# Patient Record
Sex: FEMALE | Race: OTHER | HISPANIC OR LATINO
[De-identification: names, ages, dates, MRNs, and addresses within clinical notes are randomized per-mention and may not be internally consistent; named-entity substitution may affect disease eponyms.]

---

## 2017-05-31 ENCOUNTER — APPOINTMENT (OUTPATIENT)
Dept: GASTROENTEROLOGY | Facility: CLINIC | Age: 72
End: 2017-05-31

## 2017-05-31 VITALS
BODY MASS INDEX: 32.57 KG/M2 | SYSTOLIC BLOOD PRESSURE: 148 MMHG | HEART RATE: 64 BPM | WEIGHT: 177 LBS | HEIGHT: 62 IN | DIASTOLIC BLOOD PRESSURE: 72 MMHG | RESPIRATION RATE: 14 BRPM

## 2017-09-11 ENCOUNTER — APPOINTMENT (OUTPATIENT)
Dept: GASTROENTEROLOGY | Facility: CLINIC | Age: 72
End: 2017-09-11

## 2017-12-28 ENCOUNTER — APPOINTMENT (OUTPATIENT)
Dept: GASTROENTEROLOGY | Facility: CLINIC | Age: 72
End: 2017-12-28

## 2019-07-09 ENCOUNTER — APPOINTMENT (OUTPATIENT)
Dept: ORTHOPEDIC SURGERY | Facility: CLINIC | Age: 74
End: 2019-07-09

## 2019-07-18 ENCOUNTER — APPOINTMENT (OUTPATIENT)
Dept: ORTHOPEDIC SURGERY | Facility: CLINIC | Age: 74
End: 2019-07-18
Payer: MEDICARE

## 2019-07-18 DIAGNOSIS — M23.91 UNSPECIFIED INTERNAL DERANGEMENT OF RIGHT KNEE: ICD-10-CM

## 2019-07-18 DIAGNOSIS — M17.11 UNILATERAL PRIMARY OSTEOARTHRITIS, RIGHT KNEE: ICD-10-CM

## 2019-07-18 PROCEDURE — 99204 OFFICE O/P NEW MOD 45 MIN: CPT | Mod: 25

## 2019-07-18 PROCEDURE — 20610 DRAIN/INJ JOINT/BURSA W/O US: CPT | Mod: RT

## 2019-07-18 PROCEDURE — 73562 X-RAY EXAM OF KNEE 3: CPT | Mod: RT

## 2019-07-18 NOTE — PHYSICAL EXAM
[de-identified] : Laterality: Right knee\par \par General: Alert and oriented x3.  In no acute distress.  Pleasant in nature with a normal affect.  No apparent respiratory distress. \par \par Erythema, Warmth, Rubor: Negative\par Swelling/Edema: Negative \par ROM: 0-110 degrees\par Nicol's Test: Positive\par Medial Joint Line TTP: Positive\par Lateral Joint Line TTP: Slightly tender\par Lachman Exam/Anterior Drawer/Pivot Shift Test: Negative \par MCL Pain: Negative\par LCL Pain: Negative\par Iliotibial Band Pain: Negative\par Patellofemoral Joint Pain: Negative\par Pes Anserinus TTP: Negative\par Homans Sign: Negative\par Neurovascularly: Intact with no sensory or motor deficits\par  [de-identified] : 3 views of the right knee show medial compartment and patellofemoral compartment osteoarthritic changes.

## 2019-07-18 NOTE — HISTORY OF PRESENT ILLNESS
[FreeTextEntry1] : Radha is a 74-year-old female who presents with chronic right knee pain. Her pain scale today is a 6/10 right knee. She denies locking or catching of the right knee. She denies numbness and tingling going down the right lower extremity. She presents with her daughter in office today. No other complaints today.

## 2019-07-18 NOTE — PROCEDURE
[FreeTextEntry1] : Laterality: Right Knee\par \par The risks and benefits of the injection were reviewed with the patient today in office and all their questions were answered.  The injection site was the anterolateral aspect of the knee with the patient sitting up and the knees flexed to 90 degrees.  Prior to giving the injection the injection site was prepped with Chloraprep and a sterile field was created.  Sterile technique was carried out throughout the procedure.  After verbal consent from the patient we went ahead and injected the right knee today with 1 ml 40 mg Depo-Medrol, 5 ml 1% lidocaine and 4 ml of .5% Bupivacaine for a total of 10 ml with a 22 brandi 1.5" needle.  The medication was placed into the knee without complication.  Post injection the patient reported no pain, had a normal gait and good motion of the knee.  The patient denied numbness and tingling going down their leg.  There were no complications during the procedure.

## 2019-07-18 NOTE — DISCUSSION/SUMMARY
[de-identified] : Assessment: Right Knee Osteoarthritis/Pain\par \par Plan:\par 1. RICE Therapy.\par 2. Antiinflammatories/Tylenol as needed for pain of discomfort.\par 3. The patient was advised to rest the knee for 24-48 hours post injection.  The patient is able to resume normal activities in 24-48 hours post injection if the knee feels good.\par 4. Continue with a home exercise/stretching program. \par 5. All the patients questions were answered.  If the patient is experiencing any problems or has concerns they were advised to call the office or make an appointment to come in  to be evaluated.

## 2019-07-18 NOTE — CONSULT LETTER
[Dear  ___] : Dear  [unfilled], [Consult Letter:] : I had the pleasure of evaluating your patient, [unfilled]. [Please see my note below.] : Please see my note below. [Consult Closing:] : Thank you very much for allowing me to participate in the care of this patient.  If you have any questions, please do not hesitate to contact me. [Sincerely,] : Sincerely, [FreeTextEntry3] : Rey Hicks, \par Foot and Ankle surgery\par \par Orthopaedic Surgery\par [employer] [facility] [employer]\par

## 2019-10-09 ENCOUNTER — APPOINTMENT (OUTPATIENT)
Dept: ORTHOPEDIC SURGERY | Facility: CLINIC | Age: 74
End: 2019-10-09
Payer: MEDICARE

## 2019-10-09 VITALS
BODY MASS INDEX: 32.57 KG/M2 | HEIGHT: 62 IN | WEIGHT: 177 LBS | HEART RATE: 51 BPM | DIASTOLIC BLOOD PRESSURE: 74 MMHG | SYSTOLIC BLOOD PRESSURE: 114 MMHG

## 2019-10-09 DIAGNOSIS — M17.12 UNILATERAL PRIMARY OSTEOARTHRITIS, LEFT KNEE: ICD-10-CM

## 2019-10-09 PROCEDURE — 73562 X-RAY EXAM OF KNEE 3: CPT | Mod: LT

## 2019-10-09 PROCEDURE — 20610 DRAIN/INJ JOINT/BURSA W/O US: CPT | Mod: LT

## 2019-10-09 PROCEDURE — 99213 OFFICE O/P EST LOW 20 MIN: CPT | Mod: 25

## 2019-10-09 NOTE — PHYSICAL EXAM
[LE] : Sensory: Intact in bilateral lower extremities [ALL] : Biceps, brachioradialis, triceps, patellar, ankle and plantar 2+ and symmetric bilaterally [Normal] : Alert and in no acute distress [Obese] : obese [Antalgic] : not antalgic [Poor Appearance] : well-appearing [de-identified] : GENERAL APPEARANCE: Well nourished and hydrated, pleasant, alert, and oriented x 3. Appears their stated age. \par HEENT: Normocephalic, extraocular eye motion intact. Nasal septum midline. Oral cavity clear. External auditory canal clear. \par RESPIRATORY: Breath sounds clear and audible in all lobes. No wheezing, No accessory muscle use.\par CARDIOVASCULAR: No apparent abnormalities. No lower leg edema. No varicosities. Pedal pulses are palpable.\par NEUROLOGIC: Sensation is normal, no muscle weakness in the upper or lower extremities.\par DERMATOLOGIC: No apparent skin lesions, moist, warm, no rash.\par SPINE: Cervical spine appears normal and moves freely; thoracic spine appears normal and moves freely; lumbosacral spine appears normal and moves freely, normal, nontender.\par MUSCULOSKELETAL: Hands, wrists, and elbows are normal and move freely, shoulders are normal and move freely.  [Acute Distress] : not in acute distress [de-identified] : 3V Xray of the left knee done in office today and reviewed by Dr. Dg Hart demonstrates mild tricompartmental osteoarthritis of the left knee.  [de-identified] : Left knee exam shows neutral alignment, no significant joint effusion, no joint line tenderness, preserved ROM, crepitus with ROM, no pain with ROM.\par

## 2019-10-09 NOTE — PROCEDURE
[de-identified] : I injected the patient's left knee today with cortisone.\par \par I discussed at length with the patient the planned steroid and lidocaine injection. The risks, benefits, convalescence and alternatives were reviewed. The possible side effects discussed included but were not limited to: pain, swelling, heat, bleeding, and redness. Symptoms are generally mild but if they are extensive then contact the office. Giving pain relievers by mouth such as NSAIDs or Tylenol can generally treat the reactions to steroid and lidocaine. Rare cases of infection have been noted. Rash, hives and itching may occur post injection. If you have muscle pain or cramps, flushing and or swelling of the face, rapid heart beat, nausea, dizziness, fever, chills, headache, difficulty breathing, swelling in the arms or legs, or have a prickly feeling of your skin, contact a health care provider immediately. Following this discussion, the knee was prepped with Alcohol and under sterile condition the 80 mg Depo-Medrol and 6 cc Lidocaine injection was performed with a 20 gauge needle through a superolateral injection site. The needle was introduced into the joint, aspiration was performed to ensure intra-articular placement and the medication was injected. Upon withdrawal of the needle the site was cleaned with alcohol and a band aid applied. The patient tolerated the injection well and there were no adverse effects. Post injection instructions included no strenuous activity for 24 hours, cryotherapy and if there are any adverse effects to contact the office. \par \par \par

## 2019-10-09 NOTE — DISCUSSION/SUMMARY
[de-identified] : 74 year old  female with mild tricompartmental osteoarthritis of the left knee. She is not yet a candidate for left TKA and I recommended initially conservative treatment. She elected to receive a cortisone injection in her left knee which she tolerated well. Should pain persist or worsen despite cortisone injection I will order MRI of the bilateral knees for further evaluation of her chronic knee pain. F/U 3 months for repeat cortisone injection if needed.

## 2019-10-09 NOTE — ADDENDUM
[FreeTextEntry1] : I, Reyes Tobar, acted solely as a scribe for Dr. Dg Hart on this date 10/09/2019.

## 2019-10-09 NOTE — HISTORY OF PRESENT ILLNESS
[Standing] : standing [Walking] : worsened by walking [Intermit.] : ~He/She~ states the symptoms seem to be intermittent [Recumbency] : relieved by recumbency [Rest] : relieved by rest [Worsening] : worsening [___ yrs] : [unfilled] year(s) ago [7] : a current pain level of 7/10 [Bending] : worsened by bending [de-identified] : 74 year old female here for evaluation of bilateral knee pain, left worse than right.  patient states pain is chronic over past 5 years, but has been worsening for last 3 years. She states pain is diffuse in the left knee. She rates pain 7/10 in severity and describes pain as sharp. Pain is worse with walking. patient had left knee injection 2 years ago. Patient saw Dr. Hicks in July 2019 and had injection with relief.   [de-identified] : injections

## 2021-02-09 DIAGNOSIS — E55.9 VITAMIN D DEFICIENCY, UNSPECIFIED: ICD-10-CM

## 2021-02-09 DIAGNOSIS — K76.0 FATTY (CHANGE OF) LIVER, NOT ELSEWHERE CLASSIFIED: ICD-10-CM

## 2021-02-09 DIAGNOSIS — M19.90 UNSPECIFIED OSTEOARTHRITIS, UNSPECIFIED SITE: ICD-10-CM

## 2021-02-09 RX ORDER — SIMVASTATIN 10 MG/1
10 TABLET, FILM COATED ORAL
Qty: 90 | Refills: 0 | Status: DISCONTINUED | COMMUNITY
Start: 2019-09-11 | End: 2021-02-09

## 2021-02-09 RX ORDER — FLECAINIDE ACETATE 50 MG/1
50 TABLET ORAL
Qty: 60 | Refills: 0 | Status: DISCONTINUED | COMMUNITY
Start: 2019-10-04 | End: 2021-02-09

## 2021-02-09 RX ORDER — MELOXICAM 7.5 MG/1
7.5 TABLET ORAL
Qty: 30 | Refills: 0 | Status: DISCONTINUED | COMMUNITY
Start: 2019-07-23 | End: 2021-02-09

## 2021-02-10 ENCOUNTER — APPOINTMENT (OUTPATIENT)
Dept: CARDIOLOGY | Facility: CLINIC | Age: 76
End: 2021-02-10
Payer: MEDICARE

## 2021-02-10 VITALS
SYSTOLIC BLOOD PRESSURE: 115 MMHG | HEART RATE: 86 BPM | BODY MASS INDEX: 32.94 KG/M2 | TEMPERATURE: 97.6 F | HEIGHT: 62 IN | OXYGEN SATURATION: 97 % | RESPIRATION RATE: 16 BRPM | WEIGHT: 179 LBS | DIASTOLIC BLOOD PRESSURE: 75 MMHG

## 2021-02-10 DIAGNOSIS — Z82.3 FAMILY HISTORY OF STROKE: ICD-10-CM

## 2021-02-10 DIAGNOSIS — Z78.9 OTHER SPECIFIED HEALTH STATUS: ICD-10-CM

## 2021-02-10 DIAGNOSIS — Z72.3 LACK OF PHYSICAL EXERCISE: ICD-10-CM

## 2021-02-10 PROCEDURE — 99072 ADDL SUPL MATRL&STAF TM PHE: CPT

## 2021-02-10 PROCEDURE — 93000 ELECTROCARDIOGRAM COMPLETE: CPT

## 2021-02-10 PROCEDURE — 99204 OFFICE O/P NEW MOD 45 MIN: CPT

## 2021-02-10 RX ORDER — SIMVASTATIN 10 MG/1
10 TABLET, FILM COATED ORAL
Refills: 0 | Status: DISCONTINUED | COMMUNITY
End: 2021-02-10

## 2021-02-10 NOTE — REVIEW OF SYSTEMS
[Joint Swelling] : joint swelling [Joint Stiffness] : joint stiffness [FreeTextEntry1] : Other than as documented here and in the HPI, the thirteen point ROS is negative

## 2021-02-10 NOTE — REASON FOR VISIT
[FreeTextEntry1] : 75 year old Lithuanian speaking patient native of [facility] looking to transfer care from [facility].\par \par Her cardiac hx includes:\par 1. PAF now chronic  s/p previous  DCC ( 6/19/20).\par 2. B/L carotid disease\par 3. WIGGINS\par 4. RBBB\par \par Afib now persistent  despite antiarrhythmic tx with Rythmol. Underwent external cardioversion on 6/19/20 which converted her temporarily to SR. \par \par Apparently there might be some non-compliance issues with her meds ( rhymol and Eliquis)\par \par Intolerance of flecanide due to dizzy spells\par \par Last Pharmacologic stress test from 9/18/19 demonstrated normal rest-stress myocardial perfusion, ECG negative for ischemia and EF 74%\par \par 9/26/19 echo \par NL LV  70-75%\par Borderline LVH\par Mod. TR\par Mild- Mod. MR\par Mildly elevated PAP 37.7 mmHg\par Mild aortic valve sclerosis.\par \par Carotid Doppler\par 9/26/19\par Right- Mild of the dital RCCA, minimal soft plaque in the bulb and prox. MERY\par Left-  Mild plaque at the distal LCCA, buld and prox. MERY\par \par \par Nuclear stress test 9/18/2019:\par Rest stress myocardial perfusion.\par No evidence of ischemia\par Normal gated left ventricular systolic function.

## 2021-02-10 NOTE — HISTORY OF PRESENT ILLNESS
[FreeTextEntry1] : No family hx\par Denies any chest pain or sob. Does feel that her heart "races" when she climbs stairs but no other exertional discomfort.  May fatigue a bit when this occurs.  She does not feel that this is a significant departure from baseline.\par \par No palps, edema or orthopnea . Daughter present during appointment

## 2021-02-10 NOTE — ASSESSMENT
[FreeTextEntry1] : ECG- Afib at 86 bpm, nonspecific ST wave abnormalities. \par \par \par Lab data 9/19/20\par Chol. 173\par HDL 67\par LDL 90\par Tri. 73\par \par 9/26/19 echo \par NL LV  70-75%\par Borderline LVH\par Mod. TR\par Mild- Mod. MR\par Mildly elevated PAP 37.7 mmHg\par Mild aortic valve sclerosis.\par \par \par Carotid disease \par Right- Mild plaque at the distal RCCA, minimal soft plaque in the bulb and prox. MERY\par Left-  Mild plaque at the distal LCCA, bulb, and prox. MERY\par \par Impression\par \par  75 year old female with the following hx:\par 1. AFIB now chronic . Did have a cardioversion which converted her to SR initially. Today ECG demonstrates AFIB in the 80s and  gets heart racing sensation when climbing stairs.   Possibly not on sufficient amounts of AV node blocking agents.\par - Anticoagulated on Eliquis. \par \par 2. Last Melissa negative for ischemia. Does have mild  WIGGINS but probably related to deconditioning.  Never any chest pain\par \par 3. No family hx that she is aware of other then a CVA in her grandmother\par \par 4. S/P cataract sx that left her sensitive to light. \par \par 5. BP today controlled. Lipids acceptable for now. \par \par 6. b/l carotid disease , mild. \par \par 7. No evidence of ischemia on pharm. stress with preserved EF, mild LVH, mild PAH and valvular insufficiences. No signs of HF on exam today. \par \par \par Plan\par 1. Will increase Atenolol for better rate control to 25 mg 1 tablet BID. Repeat  24 holter to reassess rate in 1 week. \par 2. BW through PCP.\par 3. Repeat echo to reassess LVH, PAP and valvular insufficiences. \par 4. Medication compliance \par 5.  Instructed patient to limit caffeine and alcohol intake\par Clinical follow up in 2 months

## 2021-02-10 NOTE — PHYSICAL EXAM
[General Appearance - Well Developed] : well developed [Normal Appearance] : normal appearance [Normal Conjunctiva] : the conjunctiva exhibited no abnormalities [Eyelids - No Xanthelasma] : the eyelids demonstrated no xanthelasmas [Normal Oral Mucosa] : normal oral mucosa [No Oral Pallor] : no oral pallor [No Oral Cyanosis] : no oral cyanosis [Normal Oropharynx] : normal oropharynx [Normal Jugular Venous A Waves Present] : normal jugular venous A waves present [Normal Jugular Venous V Waves Present] : normal jugular venous V waves present [No Jugular Venous Burt A Waves] : no jugular venous burt A waves [Murmurs] : no murmurs present [Arterial Pulses Normal] : the arterial pulses were normal [Edema] : no peripheral edema present [Veins - Varicosity Changes] : no varicosital changes were noted in the lower extremities [Respiration, Rhythm And Depth] : normal respiratory rhythm and effort [Exaggerated Use Of Accessory Muscles For Inspiration] : no accessory muscle use [Auscultation Breath Sounds / Voice Sounds] : lungs were clear to auscultation bilaterally [Chest Palpation] : palpation of the chest revealed no abnormalities [Lungs Percussion] : the lungs were normal to percussion [Bowel Sounds] : normal bowel sounds [Abdomen Soft] : soft [Abdomen Tenderness] : non-tender [] : no hepato-splenomegaly [Abdomen Mass (___ Cm)] : no abdominal mass palpated [Abdomen Hernia] : no hernia was discovered [Abnormal Walk] : normal gait [Skin Color & Pigmentation] : normal skin color and pigmentation [Skin Turgor] : normal skin turgor [FreeTextEntry1] : obese

## 2021-02-17 ENCOUNTER — APPOINTMENT (OUTPATIENT)
Dept: CARDIOLOGY | Facility: CLINIC | Age: 76
End: 2021-02-17

## 2021-02-17 ENCOUNTER — APPOINTMENT (OUTPATIENT)
Dept: CARDIOLOGY | Facility: CLINIC | Age: 76
End: 2021-02-17
Payer: MEDICARE

## 2021-02-17 PROCEDURE — 99072 ADDL SUPL MATRL&STAF TM PHE: CPT

## 2021-02-17 PROCEDURE — 93306 TTE W/DOPPLER COMPLETE: CPT

## 2021-02-17 PROCEDURE — ZZZZZ: CPT

## 2021-02-27 ENCOUNTER — TRANSCRIPTION ENCOUNTER (OUTPATIENT)
Age: 76
End: 2021-02-27

## 2021-03-02 ENCOUNTER — NON-APPOINTMENT (OUTPATIENT)
Age: 76
End: 2021-03-02

## 2021-04-12 ENCOUNTER — APPOINTMENT (OUTPATIENT)
Dept: CARDIOLOGY | Facility: CLINIC | Age: 76
End: 2021-04-12
Payer: MEDICARE

## 2021-04-12 VITALS
BODY MASS INDEX: 32.39 KG/M2 | RESPIRATION RATE: 16 BRPM | TEMPERATURE: 97.4 F | OXYGEN SATURATION: 98 % | DIASTOLIC BLOOD PRESSURE: 80 MMHG | HEIGHT: 62 IN | SYSTOLIC BLOOD PRESSURE: 105 MMHG | WEIGHT: 176 LBS | HEART RATE: 67 BPM

## 2021-04-12 DIAGNOSIS — G47.9 SLEEP DISORDER, UNSPECIFIED: ICD-10-CM

## 2021-04-12 PROCEDURE — 93000 ELECTROCARDIOGRAM COMPLETE: CPT

## 2021-04-12 PROCEDURE — 99214 OFFICE O/P EST MOD 30 MIN: CPT

## 2021-04-12 PROCEDURE — 99072 ADDL SUPL MATRL&STAF TM PHE: CPT

## 2021-04-12 RX ORDER — PROPAFENONE HYDROCHLORIDE 225 MG/1
225 TABLET, FILM COATED ORAL TWICE DAILY
Refills: 0 | Status: DISCONTINUED | COMMUNITY
End: 2021-04-12

## 2021-04-12 NOTE — REVIEW OF SYSTEMS
[Joint Swelling] : joint swelling [Joint Stiffness] : joint stiffness [Recent Weight Loss (___ Lbs)] : recent [unfilled] ~Ulb weight loss [FreeTextEntry1] : Other than as documented here and in the HPI, the thirteen point ROS is negative

## 2021-04-12 NOTE — ASSESSMENT
[FreeTextEntry1] : ECG- Afib at 86 bpm, nonspecific ST wave abnormalities. \par \par Lab data 9/19/20\par Chol. 173\par HDL 67\par LDL 90\par Tri. 73\par \par Echocardiogram 2/17/2021:\par LVEF 55 to 60%\par Severe tricuspid regurgitation\par Pulmonary systolic pressure is 58.\par Biatrial enlargement.\par Mild RVH.\par \par 9/26/19 echo \par NL LV  70-75%\par Borderline LVH\par Mod. TR\par Mild- Mod. MR\par Mildly elevated PAP 37.7 mmHg\par Mild aortic valve sclerosis.\par \par Carotid disease \par Right- Mild plaque at the distal RCCA, minimal soft plaque in the bulb and prox. MERY\par Left-  Mild plaque at the distal LCCA, bulb, and prox. MERY\par \par Impression\par \par  75 year old female with the following hx:\par 1. AFIB now chronic . Did have a cardioversion which converted her to SR initially.  Holter monitor demonstrated persistent atrial fibrillation with an average heart rate of 82 and a range of 53-1 11. \par  Today ECG demonstrates AFIB in the 80s \par - Anticoagulated on Eliquis. \par \par 2. Last Melissa negative for ischemia. Does have mild  WIGGINS but probably related to deconditioning and now evidence of moderately severe pulmonary hypertension..  Never any chest pain\par \par 3. No family hx that she is aware of other then a CVA in her grandmother\par \par 4. S/P cataract sx that left her sensitive to light. \par \par 5. BP today controlled. Lipids acceptable for now. \par \par 6. b/l carotid disease , mild. \par \par 7.  Echocardiography demonstrated preserved EF but severe TR, pulmonary hypertension and biatrial enlargement.  There is also suggestion of mild RVH.\par \par 8.  Symptoms compatible with obstructive sleep apnea possibly significant if this is the cause of her pulmonary hypertension.\par \par \par Plan\par 1.  Sleep study evaluation to be obtained with regard to the question of obstructive sleep apnea being the underlying cause of her pulmonary pretension and atrial fibrillation.\par Sleep disturbances snoring gasping have all been noted \par \par 2.  Continuation of atenolol twice daily but will discontinue Rythmol as patient is persistently in AF\par \par 3.  Instructed about the significance of weight loss with regard to her possible sleep apnea and pulmonary pretension.\par \par 4. Medication compliance reinforced.\par \par 5.  Instructed patient to limit caffeine and alcohol intake\par Clinical follow up in 2 months

## 2021-04-12 NOTE — PHYSICAL EXAM
[General Appearance - Well Developed] : well developed [Normal Appearance] : normal appearance [Normal Conjunctiva] : the conjunctiva exhibited no abnormalities [Eyelids - No Xanthelasma] : the eyelids demonstrated no xanthelasmas [Normal Oral Mucosa] : normal oral mucosa [No Oral Pallor] : no oral pallor [No Oral Cyanosis] : no oral cyanosis [Normal Oropharynx] : normal oropharynx [Normal Jugular Venous A Waves Present] : normal jugular venous A waves present [Normal Jugular Venous V Waves Present] : normal jugular venous V waves present [No Jugular Venous Burt A Waves] : no jugular venous burt A waves [Respiration, Rhythm And Depth] : normal respiratory rhythm and effort [Exaggerated Use Of Accessory Muscles For Inspiration] : no accessory muscle use [Auscultation Breath Sounds / Voice Sounds] : lungs were clear to auscultation bilaterally [Chest Palpation] : palpation of the chest revealed no abnormalities [Lungs Percussion] : the lungs were normal to percussion [Murmurs] : no murmurs present [Arterial Pulses Normal] : the arterial pulses were normal [Edema] : no peripheral edema present [Veins - Varicosity Changes] : no varicosital changes were noted in the lower extremities [Bowel Sounds] : normal bowel sounds [Abdomen Soft] : soft [Abdomen Tenderness] : non-tender [] : no hepato-splenomegaly [Abdomen Mass (___ Cm)] : no abdominal mass palpated [Abdomen Hernia] : no hernia was discovered [Abnormal Walk] : normal gait [Skin Color & Pigmentation] : normal skin color and pigmentation [Skin Turgor] : normal skin turgor [FreeTextEntry1] : obese

## 2021-04-12 NOTE — HISTORY OF PRESENT ILLNESS
[FreeTextEntry1] : No family hx CAD \par \par Denies any chest pain or sob. Does feel that her heart "races" when she climbs stairs but no other exertional discomfort.  May fatigue a bit when this occurs.  She does not feel that this is a significant departure from baseline.\par \par No palps, edema or orthopnea . Daughter present during appointment and states she snores/ gasps/and has generally disturbed sleep patterns\par .\par Her family has tried to watch her diet/ weight and down 3 lbs. \par \par Results of her echo will be reviewed\par \par

## 2021-04-12 NOTE — REASON FOR VISIT
[FreeTextEntry1] : 76 year old Hebrew speaking patient native of [facility] looking to transfer care from [facility].\par \par During her initial visit a holter monitor to assess her rate/rhythm revealed max rate of 111 bpm and she was instructed to continue her Atenolol BID. \par \par Her cardiac hx includes:\par 1. PAF now chronic  s/p previous  DCC ( 6/19/20).\par 2. B/L carotid disease\par 3. WIGGINS\par 4. RBBB\par \par Afib now persistent  despite antiarrhythmic tx with Rythmol. Underwent external cardioversion on 6/19/20 which converted her temporarily to SR. \par \par Apparently there might be some non-compliance issues in the past  with her meds ( rhymol and Eliquis)\par \par Intolerance of flecanide due to dizzy spells\par \par Last Pharmacologic stress test from 9/18/19 demonstrated normal rest-stress myocardial perfusion, ECG negative for ischemia and EF 74%\par \par 9/26/19 echo \par NL LV  70-75%\par Borderline LVH\par Mod. TR\par Mild- Mod. MR\par Mildly elevated PAP 37.7 mmHg\par Mild aortic valve sclerosis.\par \par Carotid Doppler\par 9/26/19\par Right- Mild of the dital RCCA, minimal soft plaque in the bulb and prox. MERY\par Left-  Mild plaque at the distal LCCA, buld and prox. MERY\par \par \par Nuclear stress test 9/18/2019:\par Rest stress myocardial perfusion.\par No evidence of ischemia\par Normal gated left ventricular systolic function.

## 2021-05-14 ENCOUNTER — OUTPATIENT (OUTPATIENT)
Dept: OUTPATIENT SERVICES | Facility: HOSPITAL | Age: 76
LOS: 1 days | End: 2021-05-14
Payer: COMMERCIAL

## 2021-05-14 DIAGNOSIS — G47.33 OBSTRUCTIVE SLEEP APNEA (ADULT) (PEDIATRIC): ICD-10-CM

## 2021-05-14 PROCEDURE — 95806 SLEEP STUDY UNATT&RESP EFFT: CPT | Mod: 26

## 2021-05-14 PROCEDURE — 95806 SLEEP STUDY UNATT&RESP EFFT: CPT

## 2021-06-24 ENCOUNTER — APPOINTMENT (OUTPATIENT)
Dept: CARDIOLOGY | Facility: CLINIC | Age: 76
End: 2021-06-24
Payer: MEDICARE

## 2021-06-24 VITALS
OXYGEN SATURATION: 98 % | HEART RATE: 48 BPM | BODY MASS INDEX: 32.39 KG/M2 | HEIGHT: 62 IN | WEIGHT: 176 LBS | DIASTOLIC BLOOD PRESSURE: 65 MMHG | RESPIRATION RATE: 16 BRPM | SYSTOLIC BLOOD PRESSURE: 137 MMHG

## 2021-06-24 PROCEDURE — 93000 ELECTROCARDIOGRAM COMPLETE: CPT

## 2021-06-24 PROCEDURE — 99214 OFFICE O/P EST MOD 30 MIN: CPT

## 2021-06-24 NOTE — PHYSICAL EXAM
[General Appearance - Well Developed] : well developed [Normal Appearance] : normal appearance [Normal Conjunctiva] : the conjunctiva exhibited no abnormalities [Eyelids - No Xanthelasma] : the eyelids demonstrated no xanthelasmas [Normal Oral Mucosa] : normal oral mucosa [No Oral Pallor] : no oral pallor [No Oral Cyanosis] : no oral cyanosis [Normal Oropharynx] : normal oropharynx [Normal Jugular Venous A Waves Present] : normal jugular venous A waves present [Normal Jugular Venous V Waves Present] : normal jugular venous V waves present [No Jugular Venous Burt A Waves] : no jugular venous burt A waves [Respiration, Rhythm And Depth] : normal respiratory rhythm and effort [Exaggerated Use Of Accessory Muscles For Inspiration] : no accessory muscle use [Auscultation Breath Sounds / Voice Sounds] : lungs were clear to auscultation bilaterally [Chest Palpation] : palpation of the chest revealed no abnormalities [Lungs Percussion] : the lungs were normal to percussion [Murmurs] : no murmurs present [Edema] : no peripheral edema present [Arterial Pulses Normal] : the arterial pulses were normal [Veins - Varicosity Changes] : no varicosital changes were noted in the lower extremities [Bowel Sounds] : normal bowel sounds [Abdomen Soft] : soft [Abdomen Tenderness] : non-tender [] : no hepato-splenomegaly [Abdomen Mass (___ Cm)] : no abdominal mass palpated [Abdomen Hernia] : no hernia was discovered [Abnormal Walk] : normal gait [Skin Color & Pigmentation] : normal skin color and pigmentation [Skin Turgor] : normal skin turgor [FreeTextEntry1] : obese

## 2021-06-24 NOTE — HISTORY OF PRESENT ILLNESS
[FreeTextEntry1] : No family hx CAD \par \par Denies any chest pain or sob. With exertion, although she does not exercise she has some mild SOB and chest pressure accompanied with fatigue and body aches. \par \par No palps, edema or orthopnea .\par .\par \par \par \par

## 2021-06-24 NOTE — REASON FOR VISIT
[FreeTextEntry1] : 76 year old Ukrainian speaking patient native of [facility]  presenting for cardiac re evaluation. \par \par During her initial visit a holter monitor to assess her rate/rhythm revealed max rate of 111 bpm and she was instructed to continue her Atenolol BID. \par During her LOV we stopped her Rythmol and she feels fine. \par \par Mentions some balance issues which is chronic.\par Had her HST which revealed Mild GOYO with 8.7 events/hr\par There was no pulmonary/sleep medicine follow-up.\par Daughter present during appointment and states she snores/ gasps/and has generally disturbed sleep patterns\par \par \par Her cardiac hx includes:\par 1. PAF s/p previous  DCC ( 6/19/20).\par 2. B/L carotid disease\par 3. WIGGINS\par 4. RBBB\par \par Previous cardiac work up through [facility]:\par  Underwent external cardioversion on 6/19/20 which converted her temporarily to SR. \par \par Apparently there might be some non-compliance issues in the past  with her meds ( rhymol and Eliquis)\par Intolerance of flecanide due to dizzy spells\par \par Last Pharmacologic stress test from 9/18/19 demonstrated normal rest-stress myocardial perfusion, ECG negative for ischemia and EF 74%\par \par 9/26/19 echo \par NL LV  70-75%\par Borderline LVH\par Mod. TR\par Mild- Mod. MR\par Mildly elevated PAP 37.7 mmHg\par Mild aortic valve sclerosis.\par \par Carotid Doppler\par 9/26/19\par Right- Mild of the dital RCCA, minimal soft plaque in the bulb and prox. MERY\par Left-  Mild plaque at the distal LCCA, buld and prox. MERY\par \par Nuclear stress test 9/18/2019:\par Rest stress myocardial perfusion.\par No evidence of ischemia\par Normal gated left ventricular systolic function.

## 2021-06-24 NOTE — ASSESSMENT
[FreeTextEntry1] : ECG- Sinus bradycardia 48 bpm. Nonspecifc intraventricular block. Possible anterolateral MI.                                                                \par Lab data 9/19/20\par Chol. 173\par HDL 67\par LDL 90\par Tri. 73\par \par Home sleep study 5/14/2021:\par Respiratory event index 8.7 consistent with mild obstructive sleep apnea.\par Lowest saturation 75%\par AutoPap titration was recommended\par \par Echocardiogram 2/17/2021:\par LVEF 55 to 60%\par Severe tricuspid regurgitation\par Pulmonary systolic pressure is 58.\par Biatrial enlargement.\par Mild RVH.\par \par 9/26/19 echo \par NL LV  70-75%\par Borderline LVH\par Mod. TR\par Mild- Mod. MR\par Mildly elevated PAP 37.7 mmHg\par Mild aortic valve sclerosis.\par \par Carotid disease \par Right- Mild plaque at the distal RCCA, minimal soft plaque in the bulb and prox. MERY\par Left-  Mild plaque at the distal LCCA, bulb, and prox. MERY\par \par Impression\par \par  76 year old female with the following hx:\par 1. AFIB was persistent but now back in sinus rhythm off of Rythmol\par    Did have a cardioversion which converted her to SR initially. \par    Holter monitor demonstrated persistent atrial fibrillation with an average heart rate of 82 and a range of . \par - Anticoagulated on Eliquis.\par -Now off of Rythmol and continued SR, denies any palps.  \par \par 2. Last Melissa negative for ischemia. Does have mild  WIGGINS with chest pressure  but probably related to deconditioning and now evidence of moderately severe pulmonary hypertension..  Never any chest pain, no successful weight loss or attempt.  ECG at her baseline \par \par 3. No family hx that she is aware of other then a CVA in her grandmother\par \par 4. S/P cataract sx that left her sensitive to light. \par \par 5. BP today controlled. Lipids acceptable for now. \par \par 6. b/l carotid disease , mild. \par \par 7.  Echocardiography demonstrated preserved EF but severe TR, pulmonary hypertension and biatrial      enlargement.  There is also suggestion of mild RVH.\par \par 8.  Symptoms compatible with obstructive sleep apnea possibly a contributing cause of her pulmonary hypertension.  Mild obstructive sleep apnea determined by home sleep study, states she was never contacted to make appt. with pulm. \par \par \par Plan\par 1.  Will reach out to sleep medicinet to schedule f/u. Daughters are the best point of contact. \par \par 2.  Continuation of atenolol twice daily . No rhythmol\par \par 3. Both Mrs. Mosquera and her daughter understand the importance of  starting some  type of formal exercise pattern to prevent further deconditioning. If by her next appointment there is weight loss and she continues to have WIGGINS we will  pursue with further stress testing. \par \par 4. Medication compliance reinforced.\par \par 5.  Instructed patient to limit caffeine and alcohol intake\par \par Clinical follow up in 3 months

## 2021-06-29 ENCOUNTER — APPOINTMENT (OUTPATIENT)
Dept: PULMONOLOGY | Facility: CLINIC | Age: 76
End: 2021-06-29
Payer: MEDICARE

## 2021-06-29 VITALS
SYSTOLIC BLOOD PRESSURE: 126 MMHG | WEIGHT: 176 LBS | OXYGEN SATURATION: 97 % | BODY MASS INDEX: 33.66 KG/M2 | HEART RATE: 64 BPM | RESPIRATION RATE: 16 BRPM | DIASTOLIC BLOOD PRESSURE: 78 MMHG | HEIGHT: 60.5 IN

## 2021-06-29 PROCEDURE — 99204 OFFICE O/P NEW MOD 45 MIN: CPT

## 2021-06-29 NOTE — DISCUSSION/SUMMARY
[Obstructive Sleep Apnea] : obstructive sleep apnea [Alcohol Avoidance] : alcohol avoidance [Sedative Avoidance] : sedative avoidance [Weight Loss Program] : weight loss program [CPAP] : CPAP [de-identified] : unclear

## 2021-06-29 NOTE — CONSULT LETTER
[Dear  ___] : Dear  [unfilled], [Consult Letter:] : I had the pleasure of evaluating your patient, [unfilled]. [Please see my note below.] : Please see my note below. [Consult Closing:] : Thank you very much for allowing me to participate in the care of this patient.  If you have any questions, please do not hesitate to contact me. [Sincerely,] : Sincerely, [FreeTextEntry3] : Anthony Adair MD FCCP\par Pulmonary/Critical Care/Sleep Medicine\par Department of Internal Medicine\par \par [facility]\par

## 2021-06-29 NOTE — DISCUSSION/SUMMARY
[Obstructive Sleep Apnea] : obstructive sleep apnea [Alcohol Avoidance] : alcohol avoidance [Sedative Avoidance] : sedative avoidance [Weight Loss Program] : weight loss program [CPAP] : CPAP [de-identified] : unclear

## 2021-06-29 NOTE — PHYSICAL EXAM
[No Acute Distress] : no acute distress [Normal Oropharynx] : normal oropharynx [IV] : Mallampati Class: IV [Neck Circumference: ___] : neck circumference: [unfilled] [Normal Appearance] : normal appearance [No Neck Mass] : no neck mass [Normal Rate/Rhythm] : normal rate/rhythm [Normal S1, S2] : normal s1, s2 [No Murmurs] : no murmurs [No Resp Distress] : no resp distress [Clear to Auscultation Bilaterally] : clear to auscultation bilaterally [No Abnormalities] : no abnormalities [Benign] : benign [Normal Gait] : normal gait [No Clubbing] : no clubbing [No Cyanosis] : no cyanosis [No Edema] : no edema [FROM] : FROM [Normal Color/ Pigmentation] : normal color/ pigmentation [No Focal Deficits] : no focal deficits [Oriented x3] : oriented x3 [Normal Affect] : normal affect

## 2021-06-29 NOTE — HISTORY OF PRESENT ILLNESS
[Awakes Unrefreshed] : awakes unrefreshed [Awakes with Dry Mouth] : awakes with dry mouth [Awakes with Headache] : awakes with headache [Snoring] : snoring [Witnessed Apneas] : witnessed apneas [Obstructive Sleep Apnea] : obstructive sleep apnea [TextBox_77] : 2am [TextBox_79] : 6am [TextBox_81] : 15 [TextBox_85] : 7 [TextBox_100] : 5/14/21 [TextBox_108] : 8.7 [TextBox_116] : 75 [ESS] : 3

## 2021-07-12 ENCOUNTER — APPOINTMENT (OUTPATIENT)
Age: 76
End: 2021-07-12
Payer: MEDICARE

## 2021-07-12 ENCOUNTER — OUTPATIENT (OUTPATIENT)
Dept: OUTPATIENT SERVICES | Facility: HOSPITAL | Age: 76
LOS: 1 days | End: 2021-07-12
Payer: COMMERCIAL

## 2021-07-12 DIAGNOSIS — G47.33 OBSTRUCTIVE SLEEP APNEA (ADULT) (PEDIATRIC): ICD-10-CM

## 2021-07-12 PROCEDURE — 95810 POLYSOM 6/> YRS 4/> PARAM: CPT

## 2021-07-12 PROCEDURE — 95810 POLYSOM 6/> YRS 4/> PARAM: CPT | Mod: 26

## 2021-07-20 ENCOUNTER — APPOINTMENT (OUTPATIENT)
Dept: DERMATOLOGY | Facility: CLINIC | Age: 76
End: 2021-07-20

## 2021-09-13 ENCOUNTER — APPOINTMENT (OUTPATIENT)
Dept: CARDIOLOGY | Facility: CLINIC | Age: 76
End: 2021-09-13

## 2021-11-01 ENCOUNTER — APPOINTMENT (OUTPATIENT)
Dept: CARDIOLOGY | Facility: CLINIC | Age: 76
End: 2021-11-01
Payer: MEDICARE

## 2021-11-01 VITALS
DIASTOLIC BLOOD PRESSURE: 75 MMHG | HEART RATE: 86 BPM | SYSTOLIC BLOOD PRESSURE: 118 MMHG | WEIGHT: 170 LBS | BODY MASS INDEX: 32.51 KG/M2 | OXYGEN SATURATION: 98 % | HEIGHT: 60.5 IN | RESPIRATION RATE: 16 BRPM

## 2021-11-01 DIAGNOSIS — R00.0 TACHYCARDIA, UNSPECIFIED: ICD-10-CM

## 2021-11-01 PROCEDURE — 99214 OFFICE O/P EST MOD 30 MIN: CPT

## 2021-11-01 PROCEDURE — 93000 ELECTROCARDIOGRAM COMPLETE: CPT

## 2021-11-01 RX ORDER — ATENOLOL 25 MG/1
25 TABLET ORAL TWICE DAILY
Qty: 180 | Refills: 2 | Status: DISCONTINUED | COMMUNITY
Start: 2019-06-11 | End: 2021-11-01

## 2021-11-01 NOTE — PHYSICAL EXAM
[General Appearance - Well Developed] : well developed [Normal Appearance] : normal appearance [Normal Conjunctiva] : the conjunctiva exhibited no abnormalities [Eyelids - No Xanthelasma] : the eyelids demonstrated no xanthelasmas [Normal Oral Mucosa] : normal oral mucosa [No Oral Pallor] : no oral pallor [No Oral Cyanosis] : no oral cyanosis [Normal Oropharynx] : normal oropharynx [Normal Jugular Venous A Waves Present] : normal jugular venous A waves present [Normal Jugular Venous V Waves Present] : normal jugular venous V waves present [No Jugular Venous Burt A Waves] : no jugular venous burt A waves [Respiration, Rhythm And Depth] : normal respiratory rhythm and effort [Exaggerated Use Of Accessory Muscles For Inspiration] : no accessory muscle use [Auscultation Breath Sounds / Voice Sounds] : lungs were clear to auscultation bilaterally [Chest Palpation] : palpation of the chest revealed no abnormalities [Lungs Percussion] : the lungs were normal to percussion [Murmurs] : no murmurs present [Arterial Pulses Normal] : the arterial pulses were normal [Edema] : no peripheral edema present [Veins - Varicosity Changes] : no varicosital changes were noted in the lower extremities [Bowel Sounds] : normal bowel sounds [Abdomen Soft] : soft [Abdomen Tenderness] : non-tender [] : no hepato-splenomegaly [Abdomen Mass (___ Cm)] : no abdominal mass palpated [Abdomen Hernia] : no hernia was discovered [Abnormal Walk] : normal gait [Skin Color & Pigmentation] : normal skin color and pigmentation [Skin Turgor] : normal skin turgor [FreeTextEntry1] : Irregular S1-S2

## 2021-11-01 NOTE — HISTORY OF PRESENT ILLNESS
[FreeTextEntry1] : No family hx CAD \par \par Denies any chest pain or sob.  Avoids any kind of strenuous activity as it exacerbates her palps. \par Today she is in AFIB but is not aware \par \par Denies CP, WIGGINS edema or orthopnea. \par \par Daughter is present for translation purposes. \par .\par \par \par \par

## 2021-11-01 NOTE — REASON FOR VISIT
[FreeTextEntry1] : 76 year old Telugu speaking patient native of [facility]  presenting for cardiac re evaluation. \par \par During her initial visit a holter monitor to assess her rate/rhythm revealed max rate of 111 bpm and she was instructed to continue her Atenolol BID. \par During her LOV we stopped her Rythmol and she feels fine. \par \par Mentions some balance issues which is chronic.\par 7/12/21 HST which revealed Mild GOYO with 8.7 events/hr.\par She was evaluated by Dr Brunilda Adair in June and was to be set up for CPAP. They have tried to contact her but have been unsuccessful. \par \par Her cardiac hx includes:\par 1. PAF s/p previous  DCC ( 6/19/20).\par 2. B/L carotid disease\par 3. WIGGINS\par 4. RBBB\par \par Previous cardiac work up through [place] [facility]:\par  Underwent external cardioversion on 6/19/20 which converted her temporarily to SR. \par \par Apparently there might be some non-compliance issues in the past  with her meds ( rhymol and Eliquis)\par Intolerance of Flecanide due to dizzy spells\par \par Last Pharmacologic stress test from 9/18/19 demonstrated normal rest-stress myocardial perfusion, ECG negative for ischemia and EF 74%\par \par 9/26/19 echo \par NL LV  70-75%\par Borderline LVH\par Mod. TR\par Mild- Mod. MR\par Mildly elevated PAP 37.7 mmHg\par Mild aortic valve sclerosis.\par \par Carotid Doppler\par 9/26/19\par Right- Mild of the distal RCCA, minimal soft plaque in the bulb and prox. MERY\par Left-  Mild plaque at the distal LCCA, buld and prox. MERY\par \par Nuclear stress test 9/18/2019:\par Rest stress myocardial perfusion.\par No evidence of ischemia\par Normal gated left ventricular systolic function.

## 2021-11-01 NOTE — ASSESSMENT
[FreeTextEntry1] : ECG-Atrial fibrillation with a ventricular rate of 86 bpm.  Poor progression.  Consider anteroseptal infarct.                                                         \par Lab data 9/19/20\par Chol. 173\par HDL 67\par LDL 90\par Tri. 73\par \par Home sleep study 5/14/2021:\par Respiratory event index 8.7 consistent with mild obstructive sleep apnea.\par Lowest saturation 75%\par AutoPap titration was recommended\par \par Attended sleep study 7/12/2021:\par AHI equals 65.6\par Richard O2 63%\par Impression severe obstructive sleep apnea\par \par Echocardiogram 2/17/2021:\par LVEF 55 to 60%\par Severe tricuspid regurgitation\par Pulmonary systolic pressure is 58.\par Biatrial enlargement.\par Mild RVH.\par \par 9/26/19 echo \par NL LV  70-75%\par Borderline LVH\par Mod. TR\par Mild- Mod. MR\par Mildly elevated PAP 37.7 mmHg\par Mild aortic valve sclerosis.\par \par Carotid disease \par Right- Mild plaque at the distal RCCA, minimal soft plaque in the bulb and prox. MERY\par Left-  Mild plaque at the distal LCCA, bulb, and prox. MERY\par \par Impression\par \par  76 year old female with the following hx:\par 1. AFIB initially persistent, transitioning back and forth in sinus and now back in A. fib\par    Did have a cardioversion which converted her to SR initially. \par    Holter monitor demonstrated persistent atrial fibrillation with an average heart rate of 82 and a range of . \par - Anticoagulated on Eliquis.\par -No longer on Rythmol and unaware that she is in AFIB.\par -Palps with exertion\par \par 2. Last Melissa negative for ischemia. Does have mild  WIGGINS with chest pressure  but probably related to deconditioning and now evidence of moderately severe pulmonary hypertension  \par \par 3. No family hx that she is aware of other then a CVA in her grandmother\par \par 4. S/P cataract sx that left her sensitive to light. \par \par 5. BP today controlled\par \par 6. b/l carotid disease , mild. \par \par 7.  Echocardiography demonstrated preserved EF but severe TR, pulmonary hypertension and biatrial      enlargement.  There is also suggestion of mild RVH.\par \par 8.  Evidence of severe obstructive sleep apnea unattended study as opposed to mild on the home sleep study\par \par 9. No signs of HF on exam or coronary symptoms. \par \par 10.Down 6 lbs\par \par Plan\par 1.  Sleep medicine contact info given. \par \par 2.  For smoother rate control of AFIB, we will stop Atenolol and start Metoprolol ER 50 QD\par \par 3. Both Mrs. Mosquera and her daughter understand the importance of maintaining some type of  formal exercise         pattern and strict diet modifications. \par \par 4. If WIGGINS returns, consider stress testing. \par \par 5.  Instructed patient to limit caffeine and alcohol intake\par \par 6.  Instructed patient about the absolute need for follow-up with sleep medicine regarding treatment of what is now severe obstructive sleep apnea\par \par Clinical follow up in 3 months

## 2021-11-04 ENCOUNTER — APPOINTMENT (OUTPATIENT)
Dept: PULMONOLOGY | Facility: CLINIC | Age: 76
End: 2021-11-04
Payer: MEDICARE

## 2021-11-04 VITALS
DIASTOLIC BLOOD PRESSURE: 60 MMHG | OXYGEN SATURATION: 98 % | HEIGHT: 60 IN | HEART RATE: 80 BPM | RESPIRATION RATE: 16 BRPM | BODY MASS INDEX: 33.57 KG/M2 | SYSTOLIC BLOOD PRESSURE: 108 MMHG | WEIGHT: 171 LBS

## 2021-11-04 PROCEDURE — 99215 OFFICE O/P EST HI 40 MIN: CPT

## 2021-11-04 RX ORDER — ZOLPIDEM TARTRATE 10 MG/1
10 TABLET ORAL
Qty: 1 | Refills: 0 | Status: DISCONTINUED | COMMUNITY
Start: 2021-06-29 | End: 2021-11-04

## 2021-11-04 RX ORDER — UBIDECARENONE/VIT E ACET 100MG-5
CAPSULE ORAL ONCE
Refills: 0 | Status: DISCONTINUED | COMMUNITY
End: 2021-11-04

## 2021-11-04 NOTE — DISCUSSION/SUMMARY
[Obstructive Sleep Apnea] : obstructive sleep apnea [Severe] : severe [Alcohol Avoidance] : alcohol avoidance [Sedative Avoidance] : sedative avoidance [Weight Loss Program] : weight loss program [CPAP] : CPAP [de-identified] : The pathophysiology of sleep was explained to the patient in detail. Inclusive of this was the reasoning behind and the expected response to positive airway pressure therapy. Compliance was outlined including further followup [de-identified] : [employer] Airsense 10 autoset (for her) in a range 4-16 with N20 mask

## 2021-11-04 NOTE — HISTORY OF PRESENT ILLNESS
[Obstructive Sleep Apnea] : obstructive sleep apnea [Awakes Unrefreshed] : awakes unrefreshed [Awakes with Dry Mouth] : awakes with dry mouth [Awakes with Headache] : awakes with headache [Snoring] : snoring [Witnessed Apneas] : witnessed apneas [Lab] : lab [TextBox_77] : 2am [TextBox_79] : 6am [TextBox_81] : 15 [TextBox_85] : 7 [TextBox_100] : 7/21/21 [TextBox_108] : 65.6 [TextBox_112] : 6.8 [TextBox_116] : 63 [TextBox_120] : Rem Index 85.3 [ESS] : 3

## 2021-11-04 NOTE — END OF VISIT
[FreeTextEntry3] :  and CPAP initiation [Time Spent: ___ minutes] : I have spent [unfilled] minutes of time on the encounter.

## 2021-11-04 NOTE — REASON FOR VISIT
[Initial] : an initial visit [Sleep Apnea] : sleep apnea [[employer] ] : provided by [employer]   [Interpreters_IDNumber] : [id number] [Interpreters_FullName] : nahid [TWNoteComboBox1] : Malian

## 2021-11-04 NOTE — CONSULT LETTER
[Dear  ___] : Dear  [unfilled], [Consult Letter:] : I had the pleasure of evaluating your patient, [unfilled]. [Please see my note below.] : Please see my note below. [Consult Closing:] : Thank you very much for allowing me to participate in the care of this patient.  If you have any questions, please do not hesitate to contact me. [Sincerely,] : Sincerely, [FreeTextEntry3] : Anthony Adair MD FCCP\par Pulmonary/Critical Care/Sleep Medicine\par Department of Internal Medicine\par \par [facility]\par

## 2022-02-03 ENCOUNTER — APPOINTMENT (OUTPATIENT)
Dept: PULMONOLOGY | Facility: CLINIC | Age: 77
End: 2022-02-03

## 2022-02-07 ENCOUNTER — APPOINTMENT (OUTPATIENT)
Dept: CARDIOLOGY | Facility: CLINIC | Age: 77
End: 2022-02-07
Payer: MEDICARE

## 2022-02-07 VITALS
HEIGHT: 60 IN | BODY MASS INDEX: 33.77 KG/M2 | SYSTOLIC BLOOD PRESSURE: 110 MMHG | OXYGEN SATURATION: 98 % | DIASTOLIC BLOOD PRESSURE: 80 MMHG | WEIGHT: 172 LBS | RESPIRATION RATE: 16 BRPM | HEART RATE: 77 BPM

## 2022-02-07 DIAGNOSIS — R42 DIZZINESS AND GIDDINESS: ICD-10-CM

## 2022-02-07 PROCEDURE — 99214 OFFICE O/P EST MOD 30 MIN: CPT

## 2022-02-07 PROCEDURE — 93000 ELECTROCARDIOGRAM COMPLETE: CPT

## 2022-02-07 NOTE — HISTORY OF PRESENT ILLNESS
[FreeTextEntry1] : No family hx CAD \par \par Denies any chest pain or sob.  Avoids any kind of strenuous activity as it exacerbates her palps. \par Today she is in AFIB but is not aware \par \par Denies CP, WIGGINS edema or orthopnea. \par \par Daughter is present for translation purposes. \par \par Admits to dietary indiscretions particularly with carbohydrates\par .\par \par \par \par

## 2022-02-07 NOTE — REASON FOR VISIT
[FreeTextEntry1] : 76 year old German speaking patient native of [facility]  presenting for cardiac re evaluation. \par Preop for colonoscopy with c/o Constipation\par \par Holter monitor to assess her rate/rhythm revealed max rate of 111 bpm and she was instructed to continue her Atenolol BID, eventually converted to Toprol-XL 50 mg daily.\par During her LOV we had instructed her to stop propafenone.  \par Now states that she has been getting it regularly from her pharmacy and has continued it to this point.\par \par Mentions some balance issues which is chronic.\par 7/12/21 HST which revealed Mild GOYO with 8.7 events/hr.\par She was evaluated by Dr Brunilda Adair in June and was to be set up for CPAP. They have tried to contact her but have been unsuccessful. \par \par Her cardiac hx includes:\par 1. PAF s/p previous  DCC ( 6/19/20).\par 2. B/L carotid disease\par 3. WIGGINS\par 4. RBBB\par \par Previous cardiac work up through [facility]:\par  Underwent external cardioversion on 6/19/20 which converted her temporarily to SR. \par \par Apparently there might be some non-compliance issues in the past  with her meds ( rhymol and Eliquis)\par Intolerance of Flecanide due to dizzy spells\par \par Last Pharmacologic stress test from 9/18/19 demonstrated normal rest-stress myocardial perfusion, ECG negative for ischemia and EF 74%\par \par 9/26/19 echo \par NL LV  70-75%\par Borderline LVH\par Mod. TR\par Mild- Mod. MR\par Mildly elevated PAP 37.7 mmHg\par Mild aortic valve sclerosis.\par \par Carotid Doppler\par 9/26/19\par Right- Mild of the distal RCCA, minimal soft plaque in the bulb and prox. MERY\par Left-  Mild plaque at the distal LCCA, buld and prox. MERY\par \par Nuclear stress test 9/18/2019:\par Rest stress myocardial perfusion.\par No evidence of ischemia\par Normal gated left ventricular systolic function.

## 2022-02-07 NOTE — ASSESSMENT
[FreeTextEntry1] : ECG-Atrial fibrillation with a ventricular rate of 77  bpm.  Poor progression.  Consider anteroseptal infarct.                                                         \par Lab data \par ------9/19/20---11/23/21\par Cho---173--------151\par HDL----67---------71\par LDL----90---------70\par Trig----73---------52\par \par Home sleep study 5/14/2021:\par Respiratory event index 8.7 consistent with mild obstructive sleep apnea.\par Lowest saturation 75%\par AutoPap titration was recommended\par \par Attended sleep study 7/12/2021:\par AHI equals 65.6\par Richard O2 63%\par Impression severe obstructive sleep apnea\par \par Echocardiogram 2/17/2021:\par LVEF 55 to 60%\par Severe tricuspid regurgitation\par Pulmonary systolic pressure is 58.\par Biatrial enlargement.\par Mild RVH.\par \par 9/26/19 echo \par NL LV  70-75%\par Borderline LVH\par Mod. TR\par Mild- Mod. MR\par Mildly elevated PAP 37.7 mmHg\par Mild aortic valve sclerosis.\par \par Carotid disease \par Right- Mild plaque at the distal RCCA, minimal soft plaque in the bulb and prox. MERY\par Left-  Mild plaque at the distal LCCA, bulb, and prox. MERY\par \par Impression\par \par  76 year old female preop for colonoscopy with the following hx:\par \par 1. AFIB initially paroxysmal transitioning back and forth in sinus and now seemingly in persistent A. fib\par    An earlier cardioversion temporarily converted her to SR initially. \par    Holter monitor demonstrated persistent atrial fibrillation with an average heart rate of 82 and a range of . \par - Anticoagulated on Eliquis.\par -Misunderstood and continued propafenone because her pharmacy was still providing\par -Palps with exertion\par \par 2. Last Melissa negative for ischemia. Does have mild  WIGGINS with chest pressure  but probably related to deconditioning and now evidence of moderately severe pulmonary hypertension  \par \par 3. No family hx that she is aware of other then a CVA in her grandmother\par \par 4. Preop for Colonoscopy. \par \par 5. BP today controlled\par \par 6. b/l carotid disease , mild. \par \par 7.  Echocardiography demonstrated preserved EF but severe TR, pulmonary hypertension and biatrial      enlargement.  There is also suggestion of mild RVH.\par \par 8.  Evidence of severe obstructive sleep apnea on the attended study as opposed to mild on the home sleep study\par \par 9. No signs of HF on exam or coronary symptoms. \par \par \par Plan\par 1.  Sleep medicine contact info given. \par \par 2.  For smoother rate control of AFIB, continue metoprolol ER 50 QD\par      Reinforced with great clarity that propafenone is to be discontinued\par \par 3. Both Mrs. Mosquera and her daughter understand the importance of maintaining some type of  formal exercise         pattern and strict diet modifications.  Especially as regards carbohydrates\par \par 4. If WIGGINS returns, consider stress testing. \par \par 5.  Instructed patient to limit caffeine and alcohol intake\par \par 6.  Instructed patient about the absolute need for follow-up with sleep medicine regarding treatment of what is now severe obstructive sleep apnea\par \par 7.  There is no cardiac contraindication to proceeding with colonoscopy.\par      Eliquis will be stopped 48 hours before the procedure.\par      \par Clinical follow up in 3 months

## 2022-03-03 ENCOUNTER — APPOINTMENT (OUTPATIENT)
Dept: PULMONOLOGY | Facility: CLINIC | Age: 77
End: 2022-03-03

## 2022-03-17 ENCOUNTER — APPOINTMENT (OUTPATIENT)
Dept: PULMONOLOGY | Facility: CLINIC | Age: 77
End: 2022-03-17
Payer: MEDICARE

## 2022-03-17 VITALS
DIASTOLIC BLOOD PRESSURE: 60 MMHG | OXYGEN SATURATION: 98 % | BODY MASS INDEX: 32.62 KG/M2 | WEIGHT: 167 LBS | SYSTOLIC BLOOD PRESSURE: 120 MMHG | HEART RATE: 82 BPM

## 2022-03-17 PROCEDURE — 99213 OFFICE O/P EST LOW 20 MIN: CPT

## 2022-03-17 RX ORDER — NEPAFENAC 3 MG/ML
0.3 SUSPENSION/ DROPS OPHTHALMIC
Qty: 3 | Refills: 0 | Status: DISCONTINUED | COMMUNITY
Start: 2021-05-13 | End: 2022-03-17

## 2022-03-17 RX ORDER — BESIFLOXACIN 6 MG/ML
0.6 SUSPENSION OPHTHALMIC
Qty: 5 | Refills: 0 | Status: DISCONTINUED | COMMUNITY
Start: 2021-05-13 | End: 2022-03-17

## 2022-03-17 RX ORDER — PREDNISOLONE ACETATE 10 MG/ML
1 SUSPENSION/ DROPS OPHTHALMIC
Qty: 5 | Refills: 0 | Status: DISCONTINUED | COMMUNITY
Start: 2021-05-13 | End: 2022-03-17

## 2022-03-17 NOTE — REASON FOR VISIT
[Initial] : an initial visit [Sleep Apnea] : sleep apnea [[employer] ] : provided by [employer]   [Interpreters_IDNumber] : [id number] [Interpreters_FullName] : Tyler [TWNoteComboBox1] : Tristanian

## 2022-03-17 NOTE — END OF VISIT
[Time Spent: ___ minutes] : I have spent [unfilled] minutes of time on the encounter. [FreeTextEntry3] :  and CPAP initiation

## 2022-03-17 NOTE — CONSULT LETTER
[Dear  ___] : Dear  [unfilled], [Consult Letter:] : I had the pleasure of evaluating your patient, [unfilled]. [Please see my note below.] : Please see my note below. [Consult Closing:] : Thank you very much for allowing me to participate in the care of this patient.  If you have any questions, please do not hesitate to contact me. [Sincerely,] : Sincerely, [FreeTextEntry3] : Anthony Adair MD FCCP\par Pulmonary/Critical Care/Sleep Medicine\par Department of Internal Medicine\par \par [facility]\par

## 2022-03-17 NOTE — DISCUSSION/SUMMARY
[Obstructive Sleep Apnea] : obstructive sleep apnea [Severe] : severe [Alcohol Avoidance] : alcohol avoidance [Sedative Avoidance] : sedative avoidance [Weight Loss Program] : weight loss program [CPAP] : CPAP [Unchanged] : unchanged [de-identified] : awaiting CPAP [de-identified] : The pathophysiology of sleep was explained to the patient in detail. Inclusive of this was the reasoning behind and the expected response to positive airway pressure therapy. Compliance was outlined including further followup [de-identified] : [employer] to [employer] to expidite delivery

## 2022-03-17 NOTE — HISTORY OF PRESENT ILLNESS
[Obstructive Sleep Apnea] : obstructive sleep apnea [Awakes Unrefreshed] : awakes unrefreshed [Awakes with Dry Mouth] : awakes with dry mouth [Awakes with Headache] : awakes with headache [Snoring] : snoring [Witnessed Apneas] : witnessed apneas [Lab] : lab [APAP:] : APAP [TextBox_77] : 2am [TextBox_79] : 6am [TextBox_81] : 15 [TextBox_100] : 7/21/21 [TextBox_85] : 7 [TextBox_108] : 65.6 [TextBox_112] : 6.8 [TextBox_116] : 63 [TextBox_120] : Rem Index 85.3 [TextBox_125] : 4-16 [TextBox_158] : [employer] [TextBox_162] : 11/4/2021 [TextBox_165] : Has not yet received CPAP due to supply issues [ESS] : 3

## 2022-03-21 ENCOUNTER — RESULT CHARGE (OUTPATIENT)
Age: 77
End: 2022-03-21

## 2022-03-22 ENCOUNTER — APPOINTMENT (OUTPATIENT)
Dept: CARDIOLOGY | Facility: CLINIC | Age: 77
End: 2022-03-22
Payer: MEDICARE

## 2022-03-22 VITALS
HEART RATE: 78 BPM | RESPIRATION RATE: 16 BRPM | WEIGHT: 169 LBS | HEIGHT: 60 IN | BODY MASS INDEX: 33.18 KG/M2 | OXYGEN SATURATION: 99 % | DIASTOLIC BLOOD PRESSURE: 72 MMHG | SYSTOLIC BLOOD PRESSURE: 142 MMHG

## 2022-03-22 DIAGNOSIS — M25.561 PAIN IN RIGHT KNEE: ICD-10-CM

## 2022-03-22 DIAGNOSIS — G89.29 PAIN IN RIGHT KNEE: ICD-10-CM

## 2022-03-22 PROCEDURE — 99214 OFFICE O/P EST MOD 30 MIN: CPT

## 2022-03-22 PROCEDURE — 93000 ELECTROCARDIOGRAM COMPLETE: CPT

## 2022-03-22 NOTE — REASON FOR VISIT
[FreeTextEntry1] : 76 year old Barbadian speaking patient native of [facility]  presenting for cardiac re evaluation. \par Preop for  Bladder bx bec of hematuria\par \par Holter monitor to assess her rate/rhythm revealed max rate of 111 bpm and she was converted to Toprol-XL 50 mg daily.\par During her LOV we had instructed her to stop propafenone.  \par There had been some miscommunication about the propafenone which she has now definitely stopped\par \par Mentions some balance issues which is chronic.\par 7/12/21 HST which revealed Mild GOYO with 8.7 events/hr.\par She was evaluated by Dr Brunilda Adair in June and was to be set up for CPAP. They have tried to contact her but have been unsuccessful. \par \par Her cardiac hx includes:\par 1. PAF s/p previous  DCC ( 6/19/20).\par 2. B/L carotid disease\par 3. WIGGINS\par 4. RBBB\par \par Previous cardiac work up through [place] [facility]:\par  Underwent external cardioversion on 6/19/20 which converted her temporarily to SR. \par \par Apparently there might be some non-compliance issues in the past  with her meds ( rhymol and Eliquis)\par Intolerance of Flecanide due to dizzy spells\par \par Last Pharmacologic stress test from 9/18/19 demonstrated normal rest-stress myocardial perfusion, ECG negative for ischemia and EF 74%\par \par 9/26/19 echo \par NL LV  70-75%\par Borderline LVH\par Mod. TR\par Mild- Mod. MR\par Mildly elevated PAP 37.7 mmHg\par Mild aortic valve sclerosis.\par \par Carotid Doppler\par 9/26/19\par Right- Mild of the distal RCCA, minimal soft plaque in the bulb and prox. MERY\par Left-  Mild plaque at the distal LCCA, buld and prox. MERY\par \par Nuclear stress test 9/18/2019:\par Rest stress myocardial perfusion.\par No evidence of ischemia\par Normal gated left ventricular systolic function.

## 2022-03-22 NOTE — ASSESSMENT
[FreeTextEntry1] : ECG-Atrial fibrillation with a ventricular rate of 78  bpm.  Poor progression.  Consider anteroseptal infarct.                                                         \par Lab data \par ------9/19/20---11/23/21\par Cho---173--------151\par HDL----67---------71\par LDL----90---------70\par Trig----73---------52\par \par Home sleep study 5/14/2021:\par Respiratory event index 8.7 consistent with mild obstructive sleep apnea.\par Lowest saturation 75%\par AutoPap titration was recommended\par \par Attended sleep study 7/12/2021:\par AHI equals 65.6\par Richard O2 63%\par Impression severe obstructive sleep apnea\par \par Echocardiogram 2/17/2021:\par LVEF 55 to 60%\par Severe tricuspid regurgitation\par Pulmonary systolic pressure is 58.\par Biatrial enlargement.\par Mild RVH.\par \par 9/26/19 echo \par NL LV  70-75%\par Borderline LVH\par Mod. TR\par Mild- Mod. MR\par Mildly elevated PAP 37.7 mmHg\par Mild aortic valve sclerosis.\par \par Carotid disease \par Right- Mild plaque at the distal RCCA, minimal soft plaque in the bulb and prox. MERY\par Left-  Mild plaque at the distal LCCA, bulb, and prox. MERY\par \par Impression\par \par  76 year old female preop for colonoscopy with the following hx:\par \par 1. AFIB initially paroxysmal transitioning back and forth in sinus and now seemingly in persistent A. fib\par    An earlier cardioversion temporarily converted her to SR initially. \par    Holter monitor demonstrated persistent atrial fibrillation with an average heart rate of 82 and a range of . \par - Anticoagulated on Eliquis.\par -Misunderstood and continued propafenone because her pharmacy was still providing.  Now definitely off of it\par -Palps with exertion\par \par 2. Last Melissa sestamibi stress was negative for ischemia. Does have mild  WIGGINS with chest pressure  but probably related to deconditioning and now evidence of moderately severe pulmonary hypertension  \par \par 3. No family hx that she is aware of other then a CVA in her grandmother\par \par 4. Preop for Bladder Bx. \par \par 5. BP today controlled\par \par 6. b/l carotid disease , mild. \par \par 7.  Echocardiography demonstrated preserved EF but severe TR, pulmonary hypertension and biatrial      enlargement.  There is also suggestion of mild RVH.\par \par 8.  Evidence of severe obstructive sleep apnea on the attended study as opposed to mild on the home sleep study\par \par 9. No signs of HF on exam or coronary symptoms. \par \par \par Plan\par 1.  Sleep medicine contact info given. \par \par 2.  For smoother rate control of AFIB, continue metoprolol ER 50 QD\par      Reinforced with great clarity that propafenone is to be discontinued\par \par 3. Both Mrs. Mosquera and her daughter understand the importance of maintaining some type of  formal exercise         pattern and strict diet modifications.  Especially as regards carbohydrates\par \par 4. If WIGGINS returns, consider stress testing. \par \par 5.  Instructed patient to limit caffeine and alcohol intake\par \par 6.  Instructed patient about the absolute need for follow-up with sleep medicine regarding treatment of what is now severe obstructive sleep apnea\par \par 7.  There is no cardiac contraindication to proceeding with bladder biopsy.\par      Eliquis will be stopped 48 hours before the procedure.\par      \par Clinical follow up in 3 months

## 2022-05-25 ENCOUNTER — APPOINTMENT (OUTPATIENT)
Dept: PULMONOLOGY | Facility: CLINIC | Age: 77
End: 2022-05-25

## 2022-07-06 ENCOUNTER — APPOINTMENT (OUTPATIENT)
Dept: CARDIOLOGY | Facility: CLINIC | Age: 77
End: 2022-07-06

## 2022-07-06 VITALS
HEART RATE: 62 BPM | SYSTOLIC BLOOD PRESSURE: 128 MMHG | DIASTOLIC BLOOD PRESSURE: 70 MMHG | BODY MASS INDEX: 33.77 KG/M2 | HEIGHT: 60 IN | WEIGHT: 172 LBS | RESPIRATION RATE: 16 BRPM

## 2022-07-06 DIAGNOSIS — I48.0 PAROXYSMAL ATRIAL FIBRILLATION: ICD-10-CM

## 2022-07-06 PROCEDURE — 93000 ELECTROCARDIOGRAM COMPLETE: CPT

## 2022-07-06 PROCEDURE — 99214 OFFICE O/P EST MOD 30 MIN: CPT

## 2022-07-06 RX ORDER — METOPROLOL SUCCINATE 50 MG/1
50 TABLET, EXTENDED RELEASE ORAL DAILY
Qty: 90 | Refills: 3 | Status: DISCONTINUED | COMMUNITY
Start: 2021-11-01 | End: 2022-07-06

## 2022-07-06 NOTE — ASSESSMENT
[FreeTextEntry1] : ECG-Atrial fibrillation with a ventricular rate of 62  bpm.  Poor progression.  Consider anteroseptal infarct.                                                         \par Lab data \par ------9/19/20---11/23/21\par Cho---173--------151\par HDL----67---------71\par LDL----90---------70\par Trig----73---------52\par \par Home sleep study 5/14/2021:\par Respiratory event index 8.7 consistent with mild obstructive sleep apnea.\par Lowest saturation 75%\par AutoPap titration was recommended\par \par Attended sleep study 7/12/2021:\par AHI equals 65.6\par Richard O2 63%\par Impression severe obstructive sleep apnea\par \par Echocardiogram 2/17/2021:\par LVEF 55 to 60%\par Severe tricuspid regurgitation\par Pulmonary systolic pressure is 58.\par Biatrial enlargement.\par Mild RVH.\par \par 9/26/19 echo \par NL LV  70-75%\par Borderline LVH\par Mod. TR\par Mild- Mod. MR\par Mildly elevated PAP 37.7 mmHg\par Mild aortic valve sclerosis.\par \par Carotid disease \par Right- Mild plaque at the distal RCCA, minimal soft plaque in the bulb and prox. MERY\par Left-  Mild plaque at the distal LCCA, bulb, and prox. MERY\par \par Impression\par \par 1. AFIB initially paroxysmal transitioning back and forth in sinus and now seemingly in persistent A. fib\par    An earlier cardioversion temporarily converted her to SR initially. \par    Holter monitor demonstrated persistent atrial fibrillation with an average heart rate of 82 and a range of . \par - Anticoagulated on Eliquis.\par \par 2. Last Melissa sestamibi stress was negative for ischemia. Does have mild  WIGGINS with chest pressure  but probably related to deconditioning and now evidence of moderately severe pulmonary hypertension  \par \par 3. No family hx that she is aware of other then a CVA in her grandmother\par \par 4.  Evidence of severe obstructive sleep apnea on the attended study as opposed to mild on the home sleep study\par      On CPAP x1 month with pulmonary follow-up shortly.\par \par 5. BP today controlled\par \par 6. b/l carotid disease , mild. \par \par 7.  Echocardiography demonstrated preserved EF but severe TR, pulmonary hypertension and biatrial      enlargement.  There is also suggestion of mild RVH.\par \par Plan\par 1.  Follow-up with sleep medicine  \par \par 2.  For smoother rate control of AFIB, continue metoprolol ER 50 QD\par      Reinforced with great clarity that propafenone is to be discontinued\par \par 3. Both Mrs. Mosquera and her daughter understand the importance of maintaining some type of  formal exercise         pattern and strict diet modifications.  Especially as regards carbohydrates\par \par 4. If WIGGINS returns, consider stress testing. \par \par 5.  Instructed patient to limit caffeine and alcohol intake\par \par 6.  Instructed patient about the absolute need for follow-up with sleep medicine regarding treatment of what is now severe obstructive sleep apnea.  She was a no-show for her last telehealth visit with pulmonary\par \par 7.  Reviewed in detail with the patient the relationship between her weight, her sleep apnea, her pulmonary hypertension, her atrial fibrillation.\par      \par 8.  Repeat echocardiogram and office visit here in 6 months

## 2022-07-06 NOTE — HISTORY OF PRESENT ILLNESS
[FreeTextEntry1] : No family hx CAD \par \par Denies any chest pain or sob.  Avoids any kind of strenuous activity as it exacerbates her palps. \par Today she is in AFIB but is not aware \par \par Denies CP, WIGGINS edema or orthopnea. \par Concerned about increasing the venous markings on her legs.\par \par Admits to dietary indiscretions particularly with carbohydrates\par .\par \par \par \par

## 2022-07-06 NOTE — REASON FOR VISIT
[FreeTextEntry1] : 76 year old Sinhala speaking patient native of [facility]  presenting for cardiac re evaluation\par Problems include:\par 1.  Severe pulmonary hypertension\par 2.  Severe obstructive sleep apnea AHI greater than 60; now on CPAP x1\par 3.  Persistent atrial fibrillation (  s/p previous  DCC ( 6/19/20).\par \par Bladder biopsy performed without incident and showed benign results\par \par Holter monitor to assess her rate/rhythm revealed max rate of 111 bpm and she was converted to Toprol-XL 50 mg daily.\par During her LOV we had instructed her to stop propafenone.  \par There had been some miscommunication about the propafenone which she has now definitely stopped\par \par Mentions some balance issues which is chronic.\par 7/12/21 HST which revealed Mild GOYO with 8.7 events/hr.\par She was evaluated by Dr Brunilda Adair in June and was to be set up for CPAP. They have tried to contact her but have been unsuccessful. \par \par Previous cardiac work up through [facility]:\par  Underwent external cardioversion on 6/19/20 which converted her temporarily to SR. \par \par Apparently there might be some non-compliance issues in the past  with her meds ( rhymol and Eliquis)\par Intolerance of Flecanide due to dizzy spells\par \par  Pharmacologic stress test from 9/18/19 demonstrated normal rest-stress myocardial perfusion, ECG negative for ischemia and EF 74%\par \par 9/26/19 echo \par NL LV  70-75%\par Borderline LVH\par Mod. TR\par Mild- Mod. MR\par Mildly elevated PAP 37.7 mmHg\par Mild aortic valve sclerosis.\par \par Carotid Doppler\par 9/26/19\par Right- Mild of the distal RCCA, minimal soft plaque in the bulb and prox. MERY\par Left-  Mild plaque at the distal LCCA, buld and prox. MERY\par \par Nuclear stress test 9/18/2019:\par Rest stress myocardial perfusion.\par No evidence of ischemia\par Normal gated left ventricular systolic function.
The patient is a 73y Male complaining of flank pain.

## 2022-07-11 ENCOUNTER — APPOINTMENT (OUTPATIENT)
Dept: PULMONOLOGY | Facility: CLINIC | Age: 77
End: 2022-07-11

## 2022-07-11 VITALS
SYSTOLIC BLOOD PRESSURE: 118 MMHG | HEIGHT: 60 IN | RESPIRATION RATE: 16 BRPM | HEART RATE: 82 BPM | DIASTOLIC BLOOD PRESSURE: 68 MMHG | BODY MASS INDEX: 34.75 KG/M2 | OXYGEN SATURATION: 98 % | WEIGHT: 177 LBS

## 2022-07-11 PROCEDURE — 99214 OFFICE O/P EST MOD 30 MIN: CPT

## 2022-07-11 NOTE — CONSULT LETTER
[Dear  ___] : Dear  [unfilled], [Consult Letter:] : I had the pleasure of evaluating your patient, [unfilled]. [Please see my note below.] : Please see my note below. [Consult Closing:] : Thank you very much for allowing me to participate in the care of this patient.  If you have any questions, please do not hesitate to contact me. [Sincerely,] : Sincerely, [FreeTextEntry3] : Anthony Adair MD FCCP\par Pulmonary/Critical Care/Sleep Medicine\par Department of Internal Medicine\par \par [facility] [employer]\par

## 2022-07-11 NOTE — HISTORY OF PRESENT ILLNESS
[Obstructive Sleep Apnea] : obstructive sleep apnea [Awakes Unrefreshed] : awakes unrefreshed [Awakes with Dry Mouth] : awakes with dry mouth [Awakes with Headache] : awakes with headache [Snoring] : snoring [Witnessed Apneas] : witnessed apneas [Lab] : lab [APAP:] : APAP [TextBox_77] : 2am [TextBox_79] : 6am [TextBox_81] : 15 [TextBox_85] : 7 [TextBox_100] : 7/21/21 [TextBox_108] : 65.6 [TextBox_112] : 6.8 [TextBox_116] : 63 [TextBox_120] : Rem Index 85.3 [TextBox_125] : 4-16 [TextBox_158] : [facility] [TextBox_162] : 11/4/2021 [TextBox_165] : Patient has received her CPAP unit.  She states that the machine is very loud keeping her up at night and therefore she has not been using it.  In addition she has a significant air leak.  No compliances available [ESS] : 3

## 2022-07-11 NOTE — REASON FOR VISIT
[Initial] : an initial visit [Sleep Apnea] : sleep apnea [Ad Hoc ] : provided by an ad hoc  [Interpreters_Relationshiptopatient] : Daughter [TWNoteComboBox1] : [place]i

## 2022-07-11 NOTE — PROCEDURE
[FreeTextEntry1] : Attempts were made to refit the patient's mask brought to the office by her daughter.  Unfortunately this mask is too large for the patient's face and needs to be replaced with a smaller size

## 2022-07-11 NOTE — DISCUSSION/SUMMARY
[Obstructive Sleep Apnea] : obstructive sleep apnea [Severe] : severe [Unchanged] : unchanged [Alcohol Avoidance] : alcohol avoidance [Sedative Avoidance] : sedative avoidance [Weight Loss Program] : weight loss program [CPAP] : CPAP [FreeTextEntry1] : 77-year-old female with a history of severe obstructive sleep apnea seen today in follow-up.  Of course is complicated by group 3 pulmonary hypertension as well as atrial fibrillation most likely secondary to the above.  Unfortunately her treatment has been complicated by a malfunctioning machine and is well as a ill fitted fullface mask which is not allowing an effective seal.\par \par Patient has been advised along with her daughter to contact the [employer] company for repair, replacement and refitting of her facemask.  The unit given does not allow for universal change of facemask otherwise a sample would have been provided [de-identified] : awaiting CPAP [de-identified] : The pathophysiology of sleep was explained to the patient in detail. Inclusive of this was the reasoning behind and the expected response to positive airway pressure therapy. Compliance was outlined including further followup [de-identified] : [employer] to expidite delivery

## 2022-09-08 ENCOUNTER — LABORATORY RESULT (OUTPATIENT)
Age: 77
End: 2022-09-08

## 2022-09-08 ENCOUNTER — APPOINTMENT (OUTPATIENT)
Dept: RHEUMATOLOGY | Facility: CLINIC | Age: 77
End: 2022-09-08

## 2022-09-08 VITALS
DIASTOLIC BLOOD PRESSURE: 64 MMHG | HEART RATE: 69 BPM | OXYGEN SATURATION: 98 % | TEMPERATURE: 98.1 F | SYSTOLIC BLOOD PRESSURE: 110 MMHG | RESPIRATION RATE: 17 BRPM | HEIGHT: 60 IN

## 2022-09-08 DIAGNOSIS — M19.90 UNSPECIFIED OSTEOARTHRITIS, UNSPECIFIED SITE: ICD-10-CM

## 2022-09-08 DIAGNOSIS — M77.8 OTHER ENTHESOPATHIES, NOT ELSEWHERE CLASSIFIED: ICD-10-CM

## 2022-09-08 DIAGNOSIS — M75.21 BICIPITAL TENDINITIS, RIGHT SHOULDER: ICD-10-CM

## 2022-09-08 DIAGNOSIS — Z86.69 PERSONAL HISTORY OF OTHER DISEASES OF THE NERVOUS SYSTEM AND SENSE ORGANS: ICD-10-CM

## 2022-09-08 DIAGNOSIS — G47.33 OBSTRUCTIVE SLEEP APNEA (ADULT) (PEDIATRIC): ICD-10-CM

## 2022-09-08 DIAGNOSIS — Z63.5 DISRUPTION OF FAMILY BY SEPARATION AND DIVORCE: ICD-10-CM

## 2022-09-08 DIAGNOSIS — Z87.39 PERSONAL HISTORY OF OTHER DISEASES OF THE MUSCULOSKELETAL SYSTEM AND CONNECTIVE TISSUE: ICD-10-CM

## 2022-09-08 DIAGNOSIS — Z86.79 PERSONAL HISTORY OF OTHER DISEASES OF THE CIRCULATORY SYSTEM: ICD-10-CM

## 2022-09-08 DIAGNOSIS — M54.41 LUMBAGO WITH SCIATICA, RIGHT SIDE: ICD-10-CM

## 2022-09-08 DIAGNOSIS — E04.2 NONTOXIC MULTINODULAR GOITER: ICD-10-CM

## 2022-09-08 DIAGNOSIS — G89.29 LUMBAGO WITH SCIATICA, RIGHT SIDE: ICD-10-CM

## 2022-09-08 DIAGNOSIS — M25.50 PAIN IN UNSPECIFIED JOINT: ICD-10-CM

## 2022-09-08 DIAGNOSIS — M54.12 RADICULOPATHY, CERVICAL REGION: ICD-10-CM

## 2022-09-08 PROCEDURE — 36415 COLL VENOUS BLD VENIPUNCTURE: CPT

## 2022-09-08 PROCEDURE — 99205 OFFICE O/P NEW HI 60 MIN: CPT | Mod: 25

## 2022-09-08 SDOH — SOCIAL STABILITY - SOCIAL INSECURITY: DISRUPTION OF FAMILY BY SEPARATION AND DIVORCE: Z63.5

## 2022-09-12 PROBLEM — E04.2 MULTIPLE THYROID NODULES: Status: RESOLVED | Noted: 2022-09-12 | Resolved: 2022-09-12

## 2022-09-12 PROBLEM — G47.33 OBSTRUCTIVE SLEEP APNEA, ADULT: Status: RESOLVED | Noted: 2022-09-12 | Resolved: 2022-09-12

## 2022-09-12 PROBLEM — Z86.69 HISTORY OF GLAUCOMA: Status: RESOLVED | Noted: 2022-09-08 | Resolved: 2022-09-12

## 2022-09-12 PROBLEM — Z86.79 HISTORY OF HYPERTENSION: Status: RESOLVED | Noted: 2022-09-08 | Resolved: 2022-09-12

## 2022-09-12 PROBLEM — Z86.79 HISTORY OF PULMONARY HYPERTENSION: Status: RESOLVED | Noted: 2022-09-12 | Resolved: 2022-09-12

## 2022-09-12 LAB
25(OH)D3 SERPL-MCNC: 33.5 NG/ML
A PHAGOCYTOPH IGG TITR SER IF: NORMAL TITER
ALBUMIN SERPL ELPH-MCNC: 4 G/DL
ALP BLD-CCNC: 104 U/L
ALT SERPL-CCNC: 16 U/L
ANION GAP SERPL CALC-SCNC: 11 MMOL/L
APPEARANCE: CLEAR
AST SERPL-CCNC: 29 U/L
B BURGDOR AB SER QL IA: NEGATIVE
B MICROTI IGG TITR SER: NORMAL TITER
BACTERIA: NEGATIVE
BASOPHILS # BLD AUTO: 0.03 K/UL
BASOPHILS NFR BLD AUTO: 0.4 %
BILIRUB SERPL-MCNC: 1.1 MG/DL
BILIRUBIN URINE: NEGATIVE
BLOOD URINE: NEGATIVE
BUN SERPL-MCNC: 13 MG/DL
CALCIUM SERPL-MCNC: 9.6 MG/DL
CALCIUM SERPL-MCNC: 9.6 MG/DL
CHLORIDE SERPL-SCNC: 103 MMOL/L
CK SERPL-CCNC: 47 U/L
CO2 SERPL-SCNC: 27 MMOL/L
COLOR: NORMAL
CORTIS SERPL-MCNC: 2.1 UG/DL
CREAT SERPL-MCNC: 0.75 MG/DL
CRP SERPL-MCNC: <3 MG/L
E CHAFFEENSIS IGG TITR SER IF: NORMAL TITER
EGFR: 81 ML/MIN/1.73M2
ENA RNP AB SER IA-ACNC: <0.2 AL
ENA SCL70 IGG SER IA-ACNC: <0.2 AL
ENA SM AB SER IA-ACNC: <0.2 AL
ENA SS-A AB SER IA-ACNC: <0.2 AL
ENA SS-B AB SER IA-ACNC: <0.2 AL
EOSINOPHIL # BLD AUTO: 0.09 K/UL
EOSINOPHIL NFR BLD AUTO: 1.3 %
ERYTHROCYTE [SEDIMENTATION RATE] IN BLOOD BY WESTERGREN METHOD: 22 MM/HR
GLUCOSE QUALITATIVE U: NEGATIVE
GLUCOSE SERPL-MCNC: 95 MG/DL
HAV IGM SER QL: NONREACTIVE
HBV CORE IGG+IGM SER QL: NONREACTIVE
HBV CORE IGM SER QL: NONREACTIVE
HBV SURFACE AG SER QL: NONREACTIVE
HCT VFR BLD CALC: 45.9 %
HCV AB SER QL: NONREACTIVE
HCV S/CO RATIO: 0.21 S/CO
HGB BLD-MCNC: 14.3 G/DL
HYALINE CASTS: 0 /LPF
IMM GRANULOCYTES NFR BLD AUTO: 0.1 %
KETONES URINE: NEGATIVE
LDH SERPL-CCNC: 195 U/L
LEUKOCYTE ESTERASE URINE: ABNORMAL
LYMPHOCYTES # BLD AUTO: 1.7 K/UL
LYMPHOCYTES NFR BLD AUTO: 24.3 %
MAGNESIUM SERPL-MCNC: 2.2 MG/DL
MAN DIFF?: NORMAL
MCHC RBC-ENTMCNC: 29.6 PG
MCHC RBC-ENTMCNC: 31.2 GM/DL
MCV RBC AUTO: 95 FL
MICROSCOPIC-UA: NORMAL
MONOCYTES # BLD AUTO: 0.64 K/UL
MONOCYTES NFR BLD AUTO: 9.1 %
NEUTROPHILS # BLD AUTO: 4.53 K/UL
NEUTROPHILS NFR BLD AUTO: 64.8 %
NITRITE URINE: NEGATIVE
PARATHYROID HORMONE INTACT: 48 PG/ML
PH URINE: 6
PHOSPHATE SERPL-MCNC: 3 MG/DL
PLATELET # BLD AUTO: 185 K/UL
POTASSIUM SERPL-SCNC: 3.9 MMOL/L
PROT SERPL-MCNC: 7.6 G/DL
PROTEIN URINE: NEGATIVE
RBC # BLD: 4.83 M/UL
RBC # FLD: 13.1 %
RED BLOOD CELLS URINE: 2 /HPF
RHEUMATOID FACT SER QL: 15 IU/ML
SODIUM SERPL-SCNC: 141 MMOL/L
SPECIFIC GRAVITY URINE: 1.01
SQUAMOUS EPITHELIAL CELLS: 1 /HPF
THYROGLOB AB SERPL-ACNC: <20 IU/ML
THYROPEROXIDASE AB SERPL IA-ACNC: <10 IU/ML
TSH SERPL-ACNC: 1.03 UIU/ML
URATE SERPL-MCNC: 4.5 MG/DL
UROBILINOGEN URINE: NORMAL
WBC # FLD AUTO: 7 K/UL
WHITE BLOOD CELLS URINE: 4 /HPF

## 2022-09-12 NOTE — HISTORY OF PRESENT ILLNESS
[FreeTextEntry1] : LORENA FELIX is a 77 year old  woman who was referred here for further evaluation of joint symptoms and rheumatic diseases, accompanied by daughter-in-law Malathi Mera serving as an .\par \par 6 months ago, patient developed increased pain bilateral shoulders, elbows, wrists, MCP/PIP/DIPs, hips, knees, ankles, dorsa both feet, low back with radiation to the right lower extremity to the toes with paresthesias and neck with radiation to the right upper extremity to the fingers with paresthesias. Swelling bilateral knees. Pain worse as the day progresses. Morning stiffness 20 minutes. Some sleep disturbance and fatigue with snoring - Pulmonary diagnosed sleep apnea and using CPAP machine. PCP Dr.Carla Cantor without diagnosis or treatment. Tylenol 650 mg q.d. p.r.n. with a little relief. 8 year history of similar symptoms. 1 year ago, patient fell, hit her head and fractured right wrist - treated with a cast. Patient then developed daily temporal headaches. 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 Today, headaches persist. Denies recent headache, jaw claudication, or visual symptoms. For year, patient has pain bilateral TMJs. Recent bone densitometry 6 months ago revealed "osteoporosis" - currently taking calcium 600 mg + 400 unit vitamin D q.d. Menopause via Total Hysterectomy secondary to fibroids at age 35 - without HRT. Neurology Dr.Steven Nunez referred patient here for further evaluation.

## 2022-09-12 NOTE — REASON FOR VISIT
[Consultation] : a consultation visit [Family Member] : family member [FreeTextEntry1] : who was referred here for further evaluation of joint symptoms and rheumatic diseases

## 2022-09-12 NOTE — ASSESSMENT
[FreeTextEntry1] : Impression: LORENA FELIX is a 77 year old  woman who was referred here for further evaluation of joint symptoms and rheumatic diseases, accompanied by daughter-in-law Malathi Mera serving as an .\par \par 6 month history of increased arthralgias, including bilateral shoulders, elbows, wrists, MCPs/PIPs, hips, knees, ankles, dorsum of both feet, with 8-year history of similar joint symptoms.. I will evaluate for various types of rheumatic diseases and various types of inflammatory arthritis. She has low back pain with radiation to the right lower extremity to the toes with paresthesias - consider LS radiculopathy or neuropathy. Neck pain with radiation to the right upper extremity to the fingers with paresthesias - consider cervical radiculopathy. Some sleep disturbance and fatigue with snoring secondary to fibromyalgia which is contributing to her increased joint pain.  Also diagnosed with obstructive sleep apnea for which she is using CPAP which has mildly improved the fatigue.  on exam, patient has bilateral deltoid tendonitis and right bicipital tendonitis. Tylenol 650 mg q.d. p.r.n. with a little relief. 1 year ago, patient fell, hit her head and developed daily temporal headaches. Today, headaches persist. Denies recent headache, jaw claudication, or visual symptoms. For year, patient has pain bilateral TMJs with eating. Recent bone densitometry revealed "osteoporosis" - currently taking calcium 600 mg + 400 unit vitamin D q.d. surgical menopause via Total Hysterectomy at age 35 - without HRT. Neurology referred patient here for further evaluation.\par \par Plan: \par Laboratory tests ordered - see list below - with coordination of care\par X-rays ordered - see list below - with coordination of care\par High-resolution chest CAT scan, noncontrast--- with coordination of care\par Obtain recent bone densitometry results (via [facility] completed 6 months ago)\par Diagnosis and prognosis discussed\par Continue current medications (other than those changed below)\par Duloxetine ER 30 mg q.d. at suppertime (possible side effects explained) \par Increase Tylenol 1300 mg twice daily as needed joint pain (Possible side effects explained)\par Increase calcium 600 mg twice daily at end of meals (Possible side effects explained)\par I requested the patient have neurology send me consultation report\par Back exercises--instruction sheet given and discussed\par Bilateral knee exercises--instruction sheet given and discussed \par Daily exercise starting at 10 minutes per day, gradually increasing to at least 30 minutes per day - emphasized \par Return visit 2-3 weeks

## 2022-09-12 NOTE — CONSULT LETTER
[Dear  ___] : Dear  [unfilled], [Consult Letter:] : I had the pleasure of evaluating your patient, [unfilled]. [Please see my note below.] : Please see my note below. [Consult Closing:] : Thank you very much for allowing me to participate in the care of this patient.  If you have any questions, please do not hesitate to contact me. [Sincerely,] : Sincerely, [FreeTextEntry3] : Alexander\par Myron I. Kleiner, M.D., FACR \par Chief, Division of Rheumatology\par Department of Medicine \par [facility]  [DrBrunilda  ___] : Dr. ARNETT

## 2022-09-12 NOTE — ADDENDUM
[FreeTextEntry1] : I, Anastasiya Cheng, acted solely as a scribe for Dr. Myron I. Kleiner, MD. on 09/08/2022.

## 2022-09-13 LAB
ACE BLD-CCNC: 63 U/L
ANA PAT FLD IF-IMP: ABNORMAL
ANA SER IF-ACNC: ABNORMAL
CARDIOLIPIN AB SER IA-ACNC: NEGATIVE
CCP AB SER IA-ACNC: <8 UNITS
DSDNA AB SER-ACNC: <12 IU/ML
GLIADIN IGA SER QL: 13.5 UNITS
GLIADIN IGG SER QL: <5 UNITS
GLIADIN PEPTIDE IGA SER-ACNC: NEGATIVE
GLIADIN PEPTIDE IGG SER-ACNC: NEGATIVE
MYELOPEROXIDASE AB SER QL IA: <5 UNITS
MYELOPEROXIDASE CELLS FLD QL: NEGATIVE
PROTEINASE3 AB SER IA-ACNC: <5 UNITS
PROTEINASE3 AB SER-ACNC: NEGATIVE
RF+CCP IGG SER-IMP: NEGATIVE
TTG IGA SER IA-ACNC: 2.2 U/ML
TTG IGA SER-ACNC: NEGATIVE
TTG IGG SER IA-ACNC: 1.9 U/ML
TTG IGG SER IA-ACNC: NEGATIVE

## 2022-09-18 LAB
ALBUMIN MFR SERPL ELPH: 50.6 %
ALBUMIN SERPL-MCNC: 3.8 G/DL
ALBUMIN/GLOB SERPL: 1 RATIO
ALPHA1 GLOB MFR SERPL ELPH: 3.3 %
ALPHA1 GLOB SERPL ELPH-MCNC: 0.3 G/DL
ALPHA2 GLOB MFR SERPL ELPH: 11.1 %
ALPHA2 GLOB SERPL ELPH-MCNC: 0.8 G/DL
B-GLOBULIN MFR SERPL ELPH: 16.9 %
B-GLOBULIN SERPL ELPH-MCNC: 1.3 G/DL
DEPRECATED KAPPA LC FREE/LAMBDA SER: 1.38 RATIO
ENDOMYSIUM IGA SER QL: NEGATIVE
ENDOMYSIUM IGA TITR SER: NORMAL
GAMMA GLOB FLD ELPH-MCNC: 1.4 G/DL
GAMMA GLOB MFR SERPL ELPH: 18.1 %
IGA SER QL IEP: 817 MG/DL
IGG SER QL IEP: 1350 MG/DL
IGM SER QL IEP: 152 MG/DL
INTERPRETATION SERPL IEP-IMP: NORMAL
KAPPA LC CSF-MCNC: 2.21 MG/DL
KAPPA LC SERPL-MCNC: 3.06 MG/DL
M PROTEIN SPEC IFE-MCNC: NORMAL
PROT SERPL-MCNC: 7.6 G/DL
PROT SERPL-MCNC: 7.6 G/DL
RNA POLYMERASE III IGG: 6 UNITS

## 2022-09-30 ENCOUNTER — APPOINTMENT (OUTPATIENT)
Dept: RHEUMATOLOGY | Facility: CLINIC | Age: 77
End: 2022-09-30

## 2022-09-30 VITALS
WEIGHT: 166.5 LBS | OXYGEN SATURATION: 99 % | DIASTOLIC BLOOD PRESSURE: 70 MMHG | BODY MASS INDEX: 30.64 KG/M2 | SYSTOLIC BLOOD PRESSURE: 120 MMHG | HEART RATE: 86 BPM | TEMPERATURE: 97.6 F | HEIGHT: 62 IN

## 2022-09-30 DIAGNOSIS — Z87.39 PERSONAL HISTORY OF OTHER DISEASES OF THE MUSCULOSKELETAL SYSTEM AND CONNECTIVE TISSUE: ICD-10-CM

## 2022-09-30 PROCEDURE — 99215 OFFICE O/P EST HI 40 MIN: CPT | Mod: 25

## 2022-09-30 PROCEDURE — G2212 PROLONG OUTPT/OFFICE VIS: CPT

## 2022-09-30 RX ORDER — DULOXETINE HYDROCHLORIDE 20 MG/1
20 CAPSULE, DELAYED RELEASE PELLETS ORAL
Qty: 30 | Refills: 1 | Status: DISCONTINUED | COMMUNITY
Start: 2022-09-12 | End: 2022-09-30

## 2022-09-30 NOTE — REVIEW OF SYSTEMS
[Feeling Tired] : feeling tired [Negative] : Heme/Lymph [Arthralgias] : arthralgias [Joint Pain] : joint pain [As Noted in HPI] : as noted in HPI

## 2022-10-07 ENCOUNTER — APPOINTMENT (OUTPATIENT)
Dept: PULMONOLOGY | Facility: CLINIC | Age: 77
End: 2022-10-07

## 2022-10-07 VITALS
SYSTOLIC BLOOD PRESSURE: 120 MMHG | OXYGEN SATURATION: 98 % | HEART RATE: 74 BPM | RESPIRATION RATE: 16 BRPM | WEIGHT: 167 LBS | BODY MASS INDEX: 30.54 KG/M2 | DIASTOLIC BLOOD PRESSURE: 60 MMHG

## 2022-10-07 PROCEDURE — 99215 OFFICE O/P EST HI 40 MIN: CPT

## 2022-10-07 NOTE — CONSULT LETTER
[Dear  ___] : Dear  [unfilled], [Consult Letter:] : I had the pleasure of evaluating your patient, [unfilled]. [Please see my note below.] : Please see my note below. [Consult Closing:] : Thank you very much for allowing me to participate in the care of this patient.  If you have any questions, please do not hesitate to contact me. [Sincerely,] : Sincerely, [FreeTextEntry3] : Anthony Adair MD FCCP\par Pulmonary/Critical Care/Sleep Medicine\par Department of Internal Medicine\par \par [facility]\par

## 2022-10-07 NOTE — DISCUSSION/SUMMARY
[Obstructive Sleep Apnea] : obstructive sleep apnea [Severe] : severe [Alcohol Avoidance] : alcohol avoidance [Sedative Avoidance] : sedative avoidance [Weight Loss Program] : weight loss program [CPAP] : CPAP [FreeTextEntry1] : Findings on CAT scan consistent with prior postinflammatory changes.  Region of bronchiolitis consistent with inflammation.  Follow-up CT in 6 months.  Patient asymptomatic [Improving] : improving [Responding to Treatment] : responding to treatment [Patient] : discussed with the patient [Family] : discussed with the patient's family [de-identified] : The pathophysiology of sleep was explained to the patient in detail. Inclusive of this was the reasoning behind and the expected response to positive airway pressure therapy. Compliance was outlined including further followup [de-identified] : Patient to obtain compliance

## 2022-10-07 NOTE — REASON FOR VISIT
[Initial] : an initial visit [Sleep Apnea] : sleep apnea [Ad Hoc ] : provided by an ad hoc  [Interpreters_Relationshiptopatient] : Daughter [TWNoteComboBox1] : American

## 2022-10-07 NOTE — END OF VISIT
[FreeTextEntry3] : Visit via an .  CT reviewed on PACS. [Time Spent: ___ minutes] : I have spent [unfilled] minutes of time on the encounter.

## 2022-10-07 NOTE — HISTORY OF PRESENT ILLNESS
[Obstructive Sleep Apnea] : obstructive sleep apnea [Lab] : lab [APAP:] : APAP [Never] : never [TextBox_4] : Recently found to have abnormalities on CAT scan by rheumatology.  Denies any complaints of cough wheeze or shortness of breath [Awakes Unrefreshed] : does not awaken unrefreshed [Awakes with Dry Mouth] : does not awaken with dry mouth [Awakes with Headache] : does not awaken with headache [Snoring] : no snoring [Witnessed Apneas] : no witnessed apneas [TextBox_77] : 2am [TextBox_79] : 6am [TextBox_81] : 15 [TextBox_85] : 7 [TextBox_100] : 7/21/21 [TextBox_108] : 65.6 [TextBox_112] : 6.8 [TextBox_116] : 63 [TextBox_120] : Rem Index 85.3 [TextBox_125] : 4-16 [TextBox_158] : [facility] [TextBox_162] : 11/4/2021 [TextBox_165] : Has justin CPAP\par No compliance available\par Historically 100% [ESS] : 4

## 2022-10-09 NOTE — HISTORY OF PRESENT ILLNESS
[FreeTextEntry1] : LORENA FELIX is a 77 year old woman who presents for an initial follow up for further evaluation of joint symptoms and rheumatic diseases, accompanied by daughter-in-law Malathi Mera serving as an .\par \par Patient feels fairly well. Patient has persistent pain bilateral mid back, low back with radiation to bilateral lower extremities to the toes, knees and right shoulder. Denies recent sleep disturbance and fatigue - using CPAP machine but she is still snoring.. Discontinued Duloxetine after 1 day secondary to palpitations - resolved. Tylenol 650 mg q.d. p.r.n. with relief. The patient continues calcium and vitamin D supplements. Patient denies rash or side effects with current medications. Patient is content with current medication regimen. \par \par PMH: To have thyroid biopsy

## 2022-10-09 NOTE — ADDENDUM
[FreeTextEntry1] : I, Anastasiya Cheng, acted solely as a scribe for Dr. Myron I. Kleiner, MD. on 09/30/2022.

## 2022-10-09 NOTE — ASSESSMENT
[FreeTextEntry1] : Impression: LORENA FELIX is a 77 year old woman who presents for an initial follow up for further evaluation of joint symptoms and rheumatic diseases including fibromyalgia, osteoarthritis, osteoporosis and chronic low back pain, accompanied by daughter-in-law Malathi Mera serving as an .\par \par Patient feels fairly well, although she has persistent arthralgias secondary to her osteoarthritis as seen on recent x-ray results. She has low back pain with radiation to bilateral lower extremities to the toes - consider LS radiculopathy or neuropathy. Recent Chest CAT Scan revealed moderate inflammation and small nodule left upper lung - urged patient to follow up Pulmonary.  Recent x-rays revealed osteoarthritis in multiple joints.  Denies sleep disturbance and fatigue but has snoring- using CPAP machine. Recent lab tests revealed decreased cortisol levels, otherwise no other significant results or changes as compared to previous studies. Discontinued Duloxetine after 1 day secondary to palpitations - resolved. Tylenol 650 mg q.d. p.r.n. with relief. The patient continues calcium and vitamin D supplements for her osteoporosis. Patient denies rash or side effects with current medications. Patient is content with current medication regimen. \par \par Plan: I reviewed recent lab results with patient and her daughter-in-law with extensive discussion\par I reviewed recent X-ray results with patient and her daughter-in-law  with extensive discussion\par I reviewed recent Chest CAT Scan results with patient and her daughter-in-law with extensive discussion \par Laboratory tests ordered - see list below - with coordination of care\par MRI LS Spine ordered (no contraindications) - with coordination of care \par Bone Densitometry Ordered - with coordination of care - to be authorized and performed after October 2, 2022\par Diagnosis and prognosis discussed\par Continue current medications (other than those changed below)\par Stay off Duloxetine\par Gabapentin 300 mg q.d. at supper time, if no better/side effects x7 days, increase 300 mg b.i.d.; if no better/side effects another 7 days, increase 300 mg t.i.d. (Possible side effects explained) \par Daily exercise starting at 10 minutes per day, gradually increasing to at least 30 minutes per day - emphasized \par Gradually increase the back and knee exercises by 2 repetitions each day going up to 30 repetitions each session each day--- emphasized--- extensive discussion\par Follow up Pulmonary regarding abnormal Chest CAT Scan results - Dr.Ronald Adair\par Return visit 4 months\par Total time for this office visit, including face-to-face time and non-face-to-face time, 86 minutes--- including review of the chart and previous records, review of previous lab results with extensive discussion with the patient, ordering lab tests with coordination of care, review of recent imaging reports/x-ray results with extensive discussion with the patient, ordering of new bone densitometry with coordination of care, ordering of new MRI LS spine with coordination of care, detailed medication history, review of medications going forward with their possible side effects, reviewed the impact of the patient's rheumatic disease on their other medical problems, reviewed the impact of the patient's other medical problems on their rheumatic disease

## 2022-10-09 NOTE — CONSULT LETTER
[Dear  ___] : Dear  [unfilled], [Consult Letter:] : I had the pleasure of evaluating your patient, [unfilled]. [Please see my note below.] : Please see my note below. [Consult Closing:] : Thank you very much for allowing me to participate in the care of this patient.  If you have any questions, please do not hesitate to contact me. [Sincerely,] : Sincerely, [DrBrunilda  ___] : Dr. ARNETT [FreeTextEntry3] : Alexander\par Myron I. Kleiner, M.D., FACR \par Chief, Division of Rheumatology\par Department of Medicine \par [facility]  [DrBrunilda ___] : Dr. ARNETT

## 2022-10-18 ENCOUNTER — APPOINTMENT (OUTPATIENT)
Dept: MRI IMAGING | Facility: CLINIC | Age: 77
End: 2022-10-18

## 2022-10-20 ENCOUNTER — APPOINTMENT (OUTPATIENT)
Dept: RADIOLOGY | Facility: CLINIC | Age: 77
End: 2022-10-20

## 2022-10-20 ENCOUNTER — OUTPATIENT (OUTPATIENT)
Dept: OUTPATIENT SERVICES | Facility: HOSPITAL | Age: 77
LOS: 1 days | End: 2022-10-20
Payer: COMMERCIAL

## 2022-10-20 ENCOUNTER — APPOINTMENT (OUTPATIENT)
Dept: MRI IMAGING | Facility: CLINIC | Age: 77
End: 2022-10-20

## 2022-10-20 ENCOUNTER — APPOINTMENT (OUTPATIENT)
Dept: CT IMAGING | Facility: CLINIC | Age: 77
End: 2022-10-20

## 2022-10-20 DIAGNOSIS — M54.2 CERVICALGIA: ICD-10-CM

## 2022-10-20 PROCEDURE — 77085 DXA BONE DENSITY AXL VRT FX: CPT | Mod: 26

## 2022-10-20 PROCEDURE — 72148 MRI LUMBAR SPINE W/O DYE: CPT | Mod: 26

## 2022-10-20 PROCEDURE — 72148 MRI LUMBAR SPINE W/O DYE: CPT

## 2022-10-20 PROCEDURE — 71250 CT THORAX DX C-: CPT | Mod: 26

## 2022-10-20 PROCEDURE — 77085 DXA BONE DENSITY AXL VRT FX: CPT

## 2022-10-20 PROCEDURE — 71250 CT THORAX DX C-: CPT

## 2022-11-11 ENCOUNTER — APPOINTMENT (OUTPATIENT)
Dept: INTERVENTIONAL RADIOLOGY/VASCULAR | Facility: CLINIC | Age: 77
End: 2022-11-11

## 2022-11-30 ENCOUNTER — OFFICE (OUTPATIENT)
Dept: URBAN - METROPOLITAN AREA CLINIC 115 | Facility: CLINIC | Age: 77
Setting detail: OPHTHALMOLOGY
End: 2022-11-30
Payer: COMMERCIAL

## 2022-11-30 DIAGNOSIS — H35.3132: ICD-10-CM

## 2022-11-30 DIAGNOSIS — H53.433: ICD-10-CM

## 2022-11-30 DIAGNOSIS — H02.132: ICD-10-CM

## 2022-11-30 DIAGNOSIS — H16.223: ICD-10-CM

## 2022-11-30 DIAGNOSIS — H02.011: ICD-10-CM

## 2022-11-30 PROCEDURE — 99213 OFFICE O/P EST LOW 20 MIN: CPT | Performed by: OPHTHALMOLOGY

## 2022-11-30 PROCEDURE — 92083 EXTENDED VISUAL FIELD XM: CPT | Performed by: OPHTHALMOLOGY

## 2022-11-30 ASSESSMENT — TONOMETRY
OS_IOP_MMHG: 14
OD_IOP_MMHG: 15
OD_IOP_MMHG: 16
OS_IOP_MMHG: 14

## 2022-11-30 ASSESSMENT — REFRACTION_CURRENTRX
OS_CYLINDER: -2.00
OD_CYLINDER: -0.50
OD_OVR_VA: 20/
OD_CYLINDER: -3.00
OD_ADD: +2.25
OS_ADD: +2.00
OS_SPHERE: +3.75
OS_VPRISM_DIRECTION: PROGS
OS_ADD: +2.25
OD_VPRISM_DIRECTION: PROGS
OS_AXIS: 067
OS_SPHERE: +0.25
OD_SPHERE: +0.75
OS_ADD: +3.00
OS_VPRISM_DIRECTION: PROGS
OD_AXIS: 080
OD_ADD: +2.75
OS_SPHERE: +0.25
OS_AXIS: 072
OD_VPRISM_DIRECTION: PROGS
OD_AXIS: 075
OD_ADD: +1.50
OD_AXIS: 082
OD_OVR_VA: 20/
OD_CYLINDER: -0.50
OD_OVR_VA: 20/
OD_CYLINDER: -2.50
OD_SPHERE: +3.00
OD_SPHERE: +0.75
OS_SPHERE: +3.75
OS_OVR_VA: 20/
OS_OVR_VA: 20/
OS_ADD: +1.50
OD_SPHERE: +3.50
OS_CYLINDER: -0.75
OS_VPRISM_DIRECTION: PROGS
OD_VPRISM_DIRECTION: PROGS
OD_AXIS: 066
OS_CYLINDER: -2.00
OS_CYLINDER: -1.00
OS_AXIS: 066
OD_ADD: +2.00
OS_OVR_VA: 20/
OS_AXIS: 085

## 2022-11-30 ASSESSMENT — REFRACTION_AUTOREFRACTION
OD_SPHERE: +1.25
OS_CYLINDER: -1.50
OD_CYLINDER: -1.50
OS_SPHERE: +1.00
OS_AXIS: 091
OD_AXIS: 072

## 2022-11-30 ASSESSMENT — PACHYMETRY
OD_CT_CORRECTION: 1
OD_CT_UM: 523
OS_CT_CORRECTION: -1
OS_CT_UM: 555

## 2022-11-30 ASSESSMENT — SPHEQUIV_DERIVED
OD_SPHEQUIV: 0.25
OS_SPHEQUIV: -0.125
OD_SPHEQUIV: 0.5
OS_SPHEQUIV: 0.25

## 2022-11-30 ASSESSMENT — LID POSITION - ECTROPION: OD_ECTROPION: RLL 2+

## 2022-11-30 ASSESSMENT — REFRACTION_MANIFEST
OD_ADD: +2.75
OD_AXIS: 75
OD_SPHERE: +0.50
OS_VA1: 20/25
OS_SPHERE: +0.25
OS_AXIS: 90
OD_VA1: 20/30
OS_ADD: +2.75
OS_CYLINDER: -0.75
OD_CYLINDER: -0.50

## 2022-11-30 ASSESSMENT — VISUAL ACUITY
OD_BCVA: 20/30
OS_BCVA: 20/40+1

## 2022-11-30 ASSESSMENT — LID EXAM ASSESSMENTS: OD_TRICHIASIS: RUL 2+

## 2022-12-12 ENCOUNTER — APPOINTMENT (OUTPATIENT)
Dept: CARDIOLOGY | Facility: CLINIC | Age: 77
End: 2022-12-12

## 2022-12-12 PROCEDURE — 93306 TTE W/DOPPLER COMPLETE: CPT

## 2022-12-28 ENCOUNTER — APPOINTMENT (OUTPATIENT)
Dept: CARDIOLOGY | Facility: CLINIC | Age: 77
End: 2022-12-28

## 2022-12-28 VITALS
HEIGHT: 62 IN | OXYGEN SATURATION: 98 % | RESPIRATION RATE: 16 BRPM | WEIGHT: 170 LBS | DIASTOLIC BLOOD PRESSURE: 76 MMHG | BODY MASS INDEX: 31.28 KG/M2 | SYSTOLIC BLOOD PRESSURE: 132 MMHG | HEART RATE: 72 BPM

## 2022-12-28 PROCEDURE — 93000 ELECTROCARDIOGRAM COMPLETE: CPT

## 2022-12-28 PROCEDURE — 99214 OFFICE O/P EST MOD 30 MIN: CPT | Mod: 25

## 2022-12-29 NOTE — ASSESSMENT
[FreeTextEntry1] : ECG-Atrial fibrillation with a ventricular rate of 72  bpm.  Poor progression.  Consider anteroseptal infarct.                                                         \par Lab data \par ------9/19/20---11/23/21--10/7/22\par Cho---173--------151--------153\par HDL----67---------71---------60\par LDL----90---------70---------80\par Trig----73---------52---------57\par \par Home sleep study 5/14/2021:\par Respiratory event index 8.7 consistent with mild obstructive sleep apnea.\par Lowest saturation 75%\par AutoPap titration was recommended\par \par Attended sleep study 7/12/2021:\par AHI equals 65.6\par Richard O2 63%\par Impression severe obstructive sleep apnea\par \par Echocardiogram 12/12/2022:\par Normal for size and function LVEF 55 to 60%\par RVH with normal function\par Moderate left atrial enlargement\par Mild MR and severe TR\par Severe pulmonary hypertension 62 mmHg\par \par Echocardiogram 2/17/2021:\par LVEF 55 to 60%\par Severe tricuspid regurgitation\par Pulmonary systolic pressure is 58.\par Biatrial enlargement.\par Mild RVH.\par \par 9/26/19 echo \par NL LV  70-75%\par Borderline LVH\par Mod. TR\par Mild- Mod. MR\par Mildly elevated PAP 37.7 mmHg\par Mild aortic valve sclerosis.\par \par Carotid disease \par Right- Mild plaque at the distal RCCA, minimal soft plaque in the bulb and prox. MERY\par Left-  Mild plaque at the distal LCCA, bulb, and prox. MERY\par \par Impression\par \par 1. AFIB initially paroxysmal transitioning back and forth in sinus and now seemingly in persistent A. fib\par    An earlier cardioversion temporarily converted her to SR initially. \par    Holter monitor demonstrated persistent atrial fibrillation with an average heart rate of 82 and a range of . \par - Anticoagulated on Eliquis.\par \par 2. Last Melissa sestamibi stress was negative for ischemia. Does have mild  WIGGINS with chest pressure  but probably     related to deconditioning and  moderately severe pulmonary hypertension  \par \par 3. No family hx that she is aware of other then a CVA in her grandmother\par \par 4.  Evidence of severe obstructive sleep apnea on the attended study as opposed to mild on the home sleep study\par      On CPAP x 6  month with pulmonary follow-up shortly.\par \par 5. BP today controlled\par \par 6. b/l carotid disease , mild. \par \par 7.  Echocardiography demonstrated preserved EF but severe TR, pulmonary hypertension and biatrial      enlargement.  There is also suggestion of mild RVH.  All relatively unchanged.\par \par 8.   Hyperlipidemia: Appears to be adequately controlled.\par \par Plan\par 1.  Follow-up with sleep medicine.  Would hope that we would see an improvement in the pulmonary hypertension with treatment of the sleep apnea.  Brings into question level of compliance.\par \par 2.  For smoother rate control of AFIB, continue metoprolol ER 50 QD\par      Reinforced with great clarity that propafenone is to be discontinued\par \par 3. Both Mrs. Mosquera and her daughter understand the importance of maintaining some type of  formal exercise         pattern and strict diet modifications.  Especially as regards carbohydrates\par \par 4. If WIGGINS returns, consider stress testing. \par \par 5.  Instructed patient to limit caffeine and alcohol intake\par \par 6.  Instructed patient about the absolute need for follow-up with sleep medicine regarding treatment of what is now severe obstructive sleep apnea.  She was a no-show for her last telehealth visit with pulmonary\par \par 7.  Reviewed in detail with the patient the relationship between her weight, her sleep apnea, her pulmonary hypertension, her atrial fibrillation.\par      \par 8.  Repeat echocardiogram in 1 year and office visit here in 6 months

## 2022-12-29 NOTE — REASON FOR VISIT
[FreeTextEntry1] : 77  year old Kinyarwanda speaking patient native of [facility]  presenting for cardiac re evaluation\par Problems include:\par 1.  Severe pulmonary hypertension\par 2.  Severe obstructive sleep apnea AHI greater than 60; now on CPAP x1\par 3.  Persistent atrial fibrillation (  s/p previous  DCC ( 6/19/20).\par \par Bladder biopsy performed without incident and showed benign results\par \par Holter monitor to assess her rate/rhythm revealed max rate of 111 bpm and she was converted to Toprol-XL 50 mg daily.\par Deviously, instructed her to stop propafenone.  \par \par Mentions some balance issues which are  chronic.\par 7/12/21 HST which revealed Mild GOYO with 8.7 events/hr.\par She was evaluated by Dr Brunilda Adair in June and has been on CPAP. Problem that the disc cannot be read to assess efficacy and compliance\par \par Previous cardiac work up through [place] [facility]:\par  Underwent external cardioversion on 6/19/20 which converted her temporarily to SR. \par \par Apparently there might be some non-compliance issues in the past  with her meds ( rhymol and Eliquis)\par Intolerance of Flecanide due to dizzy spells\par \par  Pharmacologic stress test from 9/18/19 demonstrated normal rest-stress myocardial perfusion, ECG negative for ischemia and EF 74%\par \par 9/26/19 echo \par NL LV  70-75%\par Borderline LVH\par Mod. TR\par Mild- Mod. MR\par Mildly elevated PAP 37.7 mmHg\par Mild aortic valve sclerosis.\par \par Carotid Doppler\par 9/26/19\par Right- Mild of the distal RCCA, minimal soft plaque in the bulb and prox. MERY\par Left-  Mild plaque at the distal LCCA, buld and prox. MERY\par \par Nuclear stress test 9/18/2019:\par Rest stress myocardial perfusion.\par No evidence of ischemia\par Normal gated left ventricular systolic function.

## 2022-12-29 NOTE — REASON FOR VISIT
[FreeTextEntry1] : 77  year old Kinyarwanda speaking patient native of [facility]  presenting for cardiac re evaluation\par Problems include:\par 1.  Severe pulmonary hypertension\par 2.  Severe obstructive sleep apnea AHI greater than 60; now on CPAP x1\par 3.  Persistent atrial fibrillation (  s/p previous  DCC ( 6/19/20).\par \par Bladder biopsy performed without incident and showed benign results\par \par Holter monitor to assess her rate/rhythm revealed max rate of 111 bpm and she was converted to Toprol-XL 50 mg daily.\par Deviously, instructed her to stop propafenone.  \par \par Mentions some balance issues which are  chronic.\par 7/12/21 HST which revealed Mild GOYO with 8.7 events/hr.\par She was evaluated by Dr Brunilda Adair in June and has been on CPAP. Problem that the disc cannot be read to assess efficacy and compliance\par \par Previous cardiac work up through [place] [facility]:\par  Underwent external cardioversion on 6/19/20 which converted her temporarily to SR. \par \par Apparently there might be some non-compliance issues in the past  with her meds ( rhymol and Eliquis)\par Intolerance of Flecanide due to dizzy spells\par \par  Pharmacologic stress test from 9/18/19 demonstrated normal rest-stress myocardial perfusion, ECG negative for ischemia and EF 74%\par \par 9/26/19 echo \par NL LV  70-75%\par Borderline LVH\par Mod. TR\par Mild- Mod. MR\par Mildly elevated PAP 37.7 mmHg\par Mild aortic valve sclerosis.\par \par Carotid Doppler\par 9/26/19\par Right- Mild of the distal RCCA, minimal soft plaque in the bulb and prox. MERY\par Left-  Mild plaque at the distal LCCA, buld and prox. MERY\par \par Nuclear stress test 9/18/2019:\par Rest stress myocardial perfusion.\par No evidence of ischemia\par Normal gated left ventricular systolic function.

## 2023-01-10 ENCOUNTER — OFFICE (OUTPATIENT)
Dept: URBAN - METROPOLITAN AREA CLINIC 115 | Facility: CLINIC | Age: 78
Setting detail: OPHTHALMOLOGY
End: 2023-01-10
Payer: COMMERCIAL

## 2023-01-10 DIAGNOSIS — H16.223: ICD-10-CM

## 2023-01-10 DIAGNOSIS — H02.034: ICD-10-CM

## 2023-01-10 DIAGNOSIS — H02.132: ICD-10-CM

## 2023-01-10 DIAGNOSIS — H02.031: ICD-10-CM

## 2023-01-10 PROCEDURE — 92012 INTRM OPH EXAM EST PATIENT: CPT | Performed by: OPHTHALMOLOGY

## 2023-01-10 ASSESSMENT — REFRACTION_CURRENTRX
OD_VPRISM_DIRECTION: PROGS
OD_SPHERE: +0.75
OD_SPHERE: +3.50
OS_ADD: +1.50
OS_CYLINDER: -0.75
OD_CYLINDER: -3.00
OS_VPRISM_DIRECTION: PROGS
OD_SPHERE: +3.00
OD_OVR_VA: 20/
OD_AXIS: 075
OS_CYLINDER: -2.00
OD_VPRISM_DIRECTION: PROGS
OS_CYLINDER: -2.00
OS_SPHERE: +3.75
OS_CYLINDER: -1.00
OS_AXIS: 066
OS_AXIS: 085
OS_ADD: +3.00
OD_CYLINDER: -0.50
OS_ADD: +2.25
OS_OVR_VA: 20/
OD_ADD: +2.75
OS_SPHERE: +3.75
OD_ADD: +1.50
OD_AXIS: 082
OD_ADD: +2.00
OD_CYLINDER: -2.50
OD_ADD: +2.25
OS_VPRISM_DIRECTION: PROGS
OD_CYLINDER: -0.50
OD_SPHERE: +0.75
OD_VPRISM_DIRECTION: PROGS
OD_OVR_VA: 20/
OS_OVR_VA: 20/
OS_ADD: +2.00
OS_SPHERE: +0.25
OS_AXIS: 067
OS_SPHERE: +0.25
OD_AXIS: 066
OD_AXIS: 080
OS_AXIS: 072
OS_OVR_VA: 20/
OS_VPRISM_DIRECTION: PROGS
OD_OVR_VA: 20/

## 2023-01-10 ASSESSMENT — LID POSITION - ENTROPION
OS_ENTROPION: LUL 2+
OD_ENTROPION: RUL 2+

## 2023-01-10 ASSESSMENT — REFRACTION_MANIFEST
OS_VA1: 20/25
OS_ADD: +2.75
OS_SPHERE: +0.25
OS_CYLINDER: -0.75
OD_ADD: +2.75
OD_VA1: 20/30
OD_SPHERE: +0.50
OD_CYLINDER: -0.50
OS_AXIS: 90
OD_AXIS: 75

## 2023-01-10 ASSESSMENT — AXIALLENGTH_DERIVED
OD_AL: 23.5405
OS_AL: 23.5863
OD_AL: 23.4439
OS_AL: 23.7333

## 2023-01-10 ASSESSMENT — CONFRONTATIONAL VISUAL FIELD TEST (CVF)
OS_FINDINGS: FULL
OD_FINDINGS: FULL

## 2023-01-10 ASSESSMENT — VISUAL ACUITY
OD_BCVA: 20/30-1
OS_BCVA: 20/30

## 2023-01-10 ASSESSMENT — KERATOMETRY
OD_K2POWER_DIOPTERS: 44.00
OD_AXISANGLE_DEGREES: 144
OS_K2POWER_DIOPTERS: 43.75
OS_K1POWER_DIOPTERS: 42.75
OS_AXISANGLE_DEGREES: 159
OD_K1POWER_DIOPTERS: 42.75

## 2023-01-10 ASSESSMENT — REFRACTION_AUTOREFRACTION
OS_AXIS: 091
OD_SPHERE: +1.25
OD_CYLINDER: -1.50
OS_CYLINDER: -1.50
OS_SPHERE: +1.00
OD_AXIS: 072

## 2023-01-10 ASSESSMENT — LID EXAM ASSESSMENTS: OD_TRICHIASIS: RUL 2+

## 2023-01-10 ASSESSMENT — PACHYMETRY
OS_CT_CORRECTION: -1
OD_CT_UM: 523
OS_CT_UM: 555
OD_CT_CORRECTION: 1

## 2023-01-10 ASSESSMENT — LID POSITION - ECTROPION: OD_ECTROPION: RLL 2+

## 2023-01-10 ASSESSMENT — TONOMETRY
OS_IOP_MMHG: 11
OD_IOP_MMHG: 10

## 2023-01-10 ASSESSMENT — SPHEQUIV_DERIVED
OD_SPHEQUIV: 0.5
OS_SPHEQUIV: -0.125
OD_SPHEQUIV: 0.25
OS_SPHEQUIV: 0.25

## 2023-01-27 ENCOUNTER — APPOINTMENT (OUTPATIENT)
Dept: RHEUMATOLOGY | Facility: CLINIC | Age: 78
End: 2023-01-27

## 2023-03-14 ENCOUNTER — OFFICE (OUTPATIENT)
Dept: URBAN - METROPOLITAN AREA CLINIC 115 | Facility: CLINIC | Age: 78
Setting detail: OPHTHALMOLOGY
End: 2023-03-14
Payer: COMMERCIAL

## 2023-03-14 DIAGNOSIS — H16.223: ICD-10-CM

## 2023-03-14 DIAGNOSIS — H02.031: ICD-10-CM

## 2023-03-14 DIAGNOSIS — H02.132: ICD-10-CM

## 2023-03-14 DIAGNOSIS — H02.034: ICD-10-CM

## 2023-03-14 PROCEDURE — 92285 EXTERNAL OCULAR PHOTOGRAPHY: CPT | Performed by: OPHTHALMOLOGY

## 2023-03-14 PROCEDURE — 92012 INTRM OPH EXAM EST PATIENT: CPT | Performed by: OPHTHALMOLOGY

## 2023-03-14 ASSESSMENT — REFRACTION_MANIFEST
OS_AXIS: 90
OS_VA1: 20/25
OD_SPHERE: +0.50
OD_ADD: +2.75
OS_CYLINDER: -0.75
OD_VA1: 20/30
OS_SPHERE: +0.25
OD_AXIS: 75
OD_CYLINDER: -0.50
OS_ADD: +2.75

## 2023-03-14 ASSESSMENT — REFRACTION_CURRENTRX
OS_AXIS: 072
OD_CYLINDER: -2.50
OD_VPRISM_DIRECTION: PROGS
OS_AXIS: 085
OS_CYLINDER: -2.00
OS_AXIS: 067
OD_SPHERE: +3.50
OS_SPHERE: +3.75
OS_OVR_VA: 20/
OS_ADD: +2.25
OD_SPHERE: +0.75
OD_AXIS: 080
OD_CYLINDER: -0.50
OD_SPHERE: +0.75
OD_CYLINDER: -0.50
OD_ADD: +1.50
OD_CYLINDER: -3.00
OS_SPHERE: +0.25
OS_ADD: +1.50
OD_AXIS: 082
OD_OVR_VA: 20/
OD_VPRISM_DIRECTION: PROGS
OD_SPHERE: +3.00
OS_VPRISM_DIRECTION: PROGS
OS_OVR_VA: 20/
OS_AXIS: 066
OD_AXIS: 066
OS_SPHERE: +3.75
OS_ADD: +3.00
OS_ADD: +2.00
OD_OVR_VA: 20/
OS_CYLINDER: -2.00
OS_CYLINDER: -0.75
OD_ADD: +2.75
OD_AXIS: 075
OS_VPRISM_DIRECTION: PROGS
OD_VPRISM_DIRECTION: PROGS
OS_CYLINDER: -1.00
OD_OVR_VA: 20/
OS_VPRISM_DIRECTION: PROGS
OS_SPHERE: +0.25
OD_ADD: +2.00
OD_ADD: +2.25
OS_OVR_VA: 20/

## 2023-03-14 ASSESSMENT — PACHYMETRY
OD_CT_UM: 523
OS_CT_CORRECTION: -1
OS_CT_UM: 555
OD_CT_CORRECTION: 1

## 2023-03-14 ASSESSMENT — CONFRONTATIONAL VISUAL FIELD TEST (CVF)
OS_FINDINGS: FULL
OD_FINDINGS: FULL

## 2023-03-14 ASSESSMENT — KERATOMETRY
OD_AXISANGLE_DEGREES: 144
OD_K1POWER_DIOPTERS: 42.75
OD_K2POWER_DIOPTERS: 44.00
OS_AXISANGLE_DEGREES: 159
OS_K2POWER_DIOPTERS: 43.75
OS_K1POWER_DIOPTERS: 42.75

## 2023-03-14 ASSESSMENT — LID EXAM ASSESSMENTS: OD_TRICHIASIS: RUL 2+

## 2023-03-14 ASSESSMENT — REFRACTION_AUTOREFRACTION
OS_AXIS: 091
OD_AXIS: 072
OD_SPHERE: +1.25
OS_CYLINDER: -1.50
OS_SPHERE: +1.00
OD_CYLINDER: -1.50

## 2023-03-14 ASSESSMENT — LID POSITION - ENTROPION
OD_ENTROPION: RUL 2+
OS_ENTROPION: LUL 2+

## 2023-03-14 ASSESSMENT — SPHEQUIV_DERIVED
OD_SPHEQUIV: 0.25
OS_SPHEQUIV: 0.25
OD_SPHEQUIV: 0.5
OS_SPHEQUIV: -0.125

## 2023-03-14 ASSESSMENT — AXIALLENGTH_DERIVED
OS_AL: 23.7333
OD_AL: 23.4439
OS_AL: 23.5863
OD_AL: 23.5405

## 2023-03-14 ASSESSMENT — TONOMETRY
OD_IOP_MMHG: 13
OS_IOP_MMHG: 12

## 2023-03-14 ASSESSMENT — VISUAL ACUITY
OD_BCVA: 20/20
OS_BCVA: 20/25

## 2023-03-14 ASSESSMENT — LID POSITION - ECTROPION: OD_ECTROPION: RLL 2+

## 2023-03-16 ENCOUNTER — OFFICE (OUTPATIENT)
Dept: URBAN - METROPOLITAN AREA CLINIC 115 | Facility: CLINIC | Age: 78
Setting detail: OPHTHALMOLOGY
End: 2023-03-16
Payer: COMMERCIAL

## 2023-03-16 DIAGNOSIS — H34.231: ICD-10-CM

## 2023-03-16 DIAGNOSIS — H35.371: ICD-10-CM

## 2023-03-16 DIAGNOSIS — H35.3132: ICD-10-CM

## 2023-03-16 DIAGNOSIS — H35.033: ICD-10-CM

## 2023-03-16 DIAGNOSIS — H43.813: ICD-10-CM

## 2023-03-16 PROCEDURE — 92014 COMPRE OPH EXAM EST PT 1/>: CPT | Performed by: SPECIALIST

## 2023-03-16 PROCEDURE — 92134 CPTRZ OPH DX IMG PST SGM RTA: CPT | Performed by: SPECIALIST

## 2023-03-16 ASSESSMENT — REFRACTION_CURRENTRX
OD_AXIS: 082
OS_OVR_VA: 20/
OS_ADD: +1.50
OD_ADD: +2.00
OS_VPRISM_DIRECTION: PROGS
OD_SPHERE: +0.75
OD_VPRISM_DIRECTION: PROGS
OS_AXIS: 085
OS_ADD: +3.00
OS_ADD: +2.25
OS_SPHERE: +3.75
OS_VPRISM_DIRECTION: PROGS
OD_AXIS: 080
OS_SPHERE: +0.25
OD_OVR_VA: 20/
OD_OVR_VA: 20/
OS_OVR_VA: 20/
OD_ADD: +1.50
OS_AXIS: 067
OD_VPRISM_DIRECTION: PROGS
OS_CYLINDER: -1.00
OD_ADD: +2.75
OS_ADD: +2.00
OD_CYLINDER: -0.50
OS_SPHERE: +0.25
OS_CYLINDER: -0.75
OD_CYLINDER: -2.50
OS_AXIS: 066
OD_ADD: +2.25
OS_OVR_VA: 20/
OD_OVR_VA: 20/
OS_CYLINDER: -2.00
OS_AXIS: 072
OS_VPRISM_DIRECTION: PROGS
OD_AXIS: 066
OD_VPRISM_DIRECTION: PROGS
OD_AXIS: 075
OD_CYLINDER: -3.00
OD_CYLINDER: -0.50
OD_SPHERE: +3.00
OS_CYLINDER: -2.00
OS_SPHERE: +3.75
OD_SPHERE: +3.50
OD_SPHERE: +0.75

## 2023-03-16 ASSESSMENT — KERATOMETRY
OS_K2POWER_DIOPTERS: 43.75
OS_K1POWER_DIOPTERS: 42.75
OD_AXISANGLE_DEGREES: 144
OS_AXISANGLE_DEGREES: 159
OD_K1POWER_DIOPTERS: 42.75
OD_K2POWER_DIOPTERS: 44.00

## 2023-03-16 ASSESSMENT — TONOMETRY
OD_IOP_MMHG: 12
OS_IOP_MMHG: 11

## 2023-03-16 ASSESSMENT — REFRACTION_AUTOREFRACTION
OD_AXIS: 072
OD_SPHERE: +1.25
OS_SPHERE: +1.00
OS_AXIS: 091
OD_CYLINDER: -1.50
OS_CYLINDER: -1.50

## 2023-03-16 ASSESSMENT — LID EXAM ASSESSMENTS: OD_TRICHIASIS: RUL 2+

## 2023-03-16 ASSESSMENT — CONFRONTATIONAL VISUAL FIELD TEST (CVF)
OD_FINDINGS: FULL
OS_FINDINGS: FULL

## 2023-03-16 ASSESSMENT — VISUAL ACUITY
OD_BCVA: 20/25
OS_BCVA: 20/25

## 2023-03-16 ASSESSMENT — LID POSITION - ENTROPION
OS_ENTROPION: LUL 2+
OD_ENTROPION: RUL 2+

## 2023-03-16 ASSESSMENT — LID POSITION - ECTROPION: OD_ECTROPION: RLL 2+

## 2023-03-16 ASSESSMENT — AXIALLENGTH_DERIVED
OS_AL: 23.5863
OD_AL: 23.4439

## 2023-03-16 ASSESSMENT — PACHYMETRY
OS_CT_CORRECTION: -1
OS_CT_UM: 555
OD_CT_UM: 523
OD_CT_CORRECTION: 1

## 2023-03-16 ASSESSMENT — SPHEQUIV_DERIVED
OS_SPHEQUIV: 0.25
OD_SPHEQUIV: 0.5

## 2023-04-05 ENCOUNTER — APPOINTMENT (OUTPATIENT)
Dept: PULMONOLOGY | Facility: CLINIC | Age: 78
End: 2023-04-05
Payer: MEDICARE

## 2023-04-05 VITALS — HEIGHT: 60 IN | BODY MASS INDEX: 32.59 KG/M2 | WEIGHT: 166 LBS

## 2023-04-05 VITALS
RESPIRATION RATE: 16 BRPM | SYSTOLIC BLOOD PRESSURE: 125 MMHG | WEIGHT: 166 LBS | HEIGHT: 60 IN | DIASTOLIC BLOOD PRESSURE: 76 MMHG | OXYGEN SATURATION: 99 % | BODY MASS INDEX: 32.59 KG/M2 | HEART RATE: 75 BPM

## 2023-04-05 DIAGNOSIS — R91.8 OTHER NONSPECIFIC ABNORMAL FINDING OF LUNG FIELD: ICD-10-CM

## 2023-04-05 PROCEDURE — 99215 OFFICE O/P EST HI 40 MIN: CPT | Mod: 25

## 2023-04-05 PROCEDURE — 94010 BREATHING CAPACITY TEST: CPT

## 2023-04-05 PROCEDURE — 94729 DIFFUSING CAPACITY: CPT

## 2023-04-05 PROCEDURE — 94727 GAS DIL/WSHOT DETER LNG VOL: CPT

## 2023-04-05 PROCEDURE — 85018 HEMOGLOBIN: CPT | Mod: QW

## 2023-04-05 NOTE — END OF VISIT
[Time Spent: ___ minutes] : I have spent [unfilled] minutes of time on the encounter. [FreeTextEntry3] : Visit via an .  CT reviewed on PACS.

## 2023-04-05 NOTE — DISCUSSION/SUMMARY
[Obstructive Sleep Apnea] : obstructive sleep apnea [Severe] : severe [Alcohol Avoidance] : alcohol avoidance [Sedative Avoidance] : sedative avoidance [Weight Loss Program] : weight loss program [CPAP] : CPAP [Patient] : discussed with the patient [Family] : discussed with the patient's family [FreeTextEntry1] : Findings on CAT scan consistent with prior postinflammatory changes.  Region of bronchiolitis consistent with inflammation.  Follow-up CT in 6 months.  Patient asymptomatic\par \par Findings on chest CT consistent with postinflammatory changes.  Possibly occupational induced. [Unchanged] : unchanged [de-identified] : Noncompliant [de-identified] : The pathophysiology of sleep was explained to the patient in detail. Inclusive of this was the reasoning behind and the expected response to positive airway pressure therapy. Compliance was outlined including further followup

## 2023-04-05 NOTE — REASON FOR VISIT
[Initial] : an initial visit [Sleep Apnea] : sleep apnea [Ad Hoc ] : provided by an ad hoc  [COPD] : COPD [Interpreters_Relationshiptopatient] : Daughter [TWNoteComboBox1] : [place]

## 2023-04-05 NOTE — CONSULT LETTER
[Dear  ___] : Dear  [unfilled], [Consult Letter:] : I had the pleasure of evaluating your patient, [unfilled]. [Please see my note below.] : Please see my note below. [Consult Closing:] : Thank you very much for allowing me to participate in the care of this patient.  If you have any questions, please do not hesitate to contact me. [Sincerely,] : Sincerely, [FreeTextEntry3] : Anthony Adair MD FCCP\par Pulmonary/Critical Care/Sleep Medicine\par Department of Internal Medicine\par \par [facility] [employer]\par

## 2023-04-05 NOTE — HISTORY OF PRESENT ILLNESS
[Obstructive Sleep Apnea] : obstructive sleep apnea [Lab] : lab [APAP:] : APAP [Never] : never [TextBox_4] : History of pulmonary hypertension\par History negative for leg edema, paroxysmal nocturnal dyspnea or orthopnea. [Awakes Unrefreshed] : does not awaken unrefreshed [Awakes with Dry Mouth] : does not awaken with dry mouth [Awakes with Headache] : does not awaken with headache [Snoring] : no snoring [Witnessed Apneas] : no witnessed apneas [TextBox_77] : 2am [TextBox_79] : 6am [TextBox_81] : 15 [TextBox_85] : 7 [TextBox_100] : 7/21/21 [TextBox_108] : 65.6 [TextBox_112] : 6.8 [TextBox_116] : 63 [TextBox_120] : Rem Index 85.3 [TextBox_125] : 4-16 [TextBox_158] : [facility] [TextBox_162] : 11/4/2021 [TextBox_165] : Has not used CPAP x 6 months\par  [ESS] : 10

## 2023-05-04 ENCOUNTER — APPOINTMENT (OUTPATIENT)
Dept: RHEUMATOLOGY | Facility: CLINIC | Age: 78
End: 2023-05-04

## 2023-05-10 ENCOUNTER — APPOINTMENT (OUTPATIENT)
Dept: CARDIOLOGY | Facility: CLINIC | Age: 78
End: 2023-05-10
Payer: MEDICARE

## 2023-05-10 VITALS
WEIGHT: 166 LBS | HEART RATE: 70 BPM | SYSTOLIC BLOOD PRESSURE: 132 MMHG | DIASTOLIC BLOOD PRESSURE: 82 MMHG | BODY MASS INDEX: 32.59 KG/M2 | HEIGHT: 60 IN | RESPIRATION RATE: 16 BRPM | OXYGEN SATURATION: 98 %

## 2023-05-10 PROCEDURE — 99214 OFFICE O/P EST MOD 30 MIN: CPT | Mod: 25

## 2023-05-10 PROCEDURE — 93000 ELECTROCARDIOGRAM COMPLETE: CPT

## 2023-05-10 RX ORDER — GABAPENTIN 300 MG/1
300 CAPSULE ORAL
Qty: 90 | Refills: 2 | Status: DISCONTINUED | COMMUNITY
Start: 2022-09-30 | End: 2023-05-10

## 2023-05-10 NOTE — REASON FOR VISIT
[FreeTextEntry1] : 78   year old Vietnamese speaking patient native of [facility]  presenting for cardiac re evaluation\par Problems include:\par 1.  Severe pulmonary hypertension\par 2.  Severe obstructive sleep apnea AHI greater than 60; noncompliant with regular CPAP\par 3.  Persistent atrial fibrillation (  s/p previous  DCC ( 6/19/20).\par \par Holter monitor to assess her rate/rhythm revealed max rate of 111 bpm and she was converted to Toprol-XL 50 mg daily.\par \par Mentions some balance issues which are  chronic.\par \par 7/12/21 HST     AHI-65.6 compatible with severe obstructive sleep apnea with sai O2 63%\par She was evaluated by Dr Brunilda Adair in June and has been on CPAP. Problem that the disc cannot be read to assess efficacy and compliance\par \par Previous cardiac work up through [facility]:\par  Underwent external cardioversion on 6/19/20 which converted her temporarily to SR. \par \par Apparently there might be some non-compliance issues in the past  with her meds ( rhymol and Eliquis)\par Intolerance of Flecanide due to dizzy spells\par \par  Pharmacologic stress test from 9/18/19 demonstrated normal rest-stress myocardial perfusion, ECG negative for ischemia and EF 74%\par \par 9/26/19 echo \par NL LV  70-75%\par Borderline LVH\par Mod. TR\par Mild- Mod. MR\par Mildly elevated PAP 37.7 mmHg\par Mild aortic valve sclerosis.\par \par Carotid Doppler\par 9/26/19\par Right- Mild of the distal RCCA, minimal soft plaque in the bulb and prox. MERY\par Left-  Mild plaque at the distal LCCA, buld and prox. MERY\par \par Nuclear stress test 9/18/2019:\par Rest stress myocardial perfusion.\par No evidence of ischemia\par Normal gated left ventricular systolic function.

## 2023-05-10 NOTE — ASSESSMENT
[FreeTextEntry1] : ECG-Atrial fibrillation with a ventricular rate of 70  bpm.  Poor progression.  Consider anteroseptal infarct.                                                         \par Lab data \par ------9/19/20---11/23/21--10/7/22\par Cho---173--------151--------153\par HDL----67---------71---------60\par LDL----90---------70---------80\par Trig----73---------52---------57\par \par Home sleep study 5/14/2021:\par Respiratory event index 8.7 consistent with mild obstructive sleep apnea.\par Lowest saturation 75%\par AutoPap titration was recommended\par \par Attended sleep study 7/12/2021:\par AHI equals 65.6\par Richard O2 63%\par Impression severe obstructive sleep apnea\par \par Echocardiogram 12/12/2022:\par Normal for size and function LVEF 55 to 60%\par RVH with normal function\par Moderate left atrial enlargement\par Mild MR and severe TR\par Severe pulmonary hypertension 62 mmHg\par \par Echocardiogram 2/17/2021:\par LVEF 55 to 60%\par Severe tricuspid regurgitation\par Pulmonary systolic pressure is 58.\par Biatrial enlargement.\par Mild RVH.\par \par 9/26/19 echo \par NL LV  70-75%\par Borderline LVH\par Mod. TR\par Mild- Mod. MR\par Mildly elevated PAP 37.7 mmHg\par Mild aortic valve sclerosis.\par \par Carotid disease \par Right- Mild plaque at the distal RCCA, minimal soft plaque in the bulb and prox. MERY\par Left-  Mild plaque at the distal LCCA, bulb, and prox. MERY\par \par Impression\par \par 1. AFIB initially paroxysmal transitioning back and forth in sinus and now seemingly in persistent A. fib\par    An earlier cardioversion temporarily converted her to SR initially. \par    Holter monitor demonstrated persistent atrial fibrillation with an average heart rate of 82 and a range of . \par - Anticoagulated on Eliquis.\par \par 2. Last Melissa sestamibi stress was negative for ischemia. Does have mild  WIGGINS with chest pressure  but probably     related to deconditioning and  moderately severe pulmonary hypertension  \par \par 3. No family hx that she is aware of other then a CVA in her grandmother\par \par 4.  Evidence of severe obstructive sleep apnea on the attended study as opposed to mild on the home sleep study\par      On CPAP x 6  month with pulmonary follow-up shortly.\par \par 5. BP today controlled\par \par 6. b/l carotid disease , mild. \par \par 7.  Echocardiography demonstrated preserved EF but severe TR, pulmonary hypertension and biatrial      enlargement.  There is also suggestion of mild RVH.  All relatively unchanged.\par \par 8.   Hyperlipidemia: Appears to be adequately controlled.\par \par Plan\par 1.  Follow-up with sleep medicine.  Would hope that we would see an improvement in the pulmonary hypertension with treatment of the sleep apnea.  Brings into question level of compliance.\par \par 2.  For smoother rate control of AFIB, continue metoprolol ER 50 QD\par      Reinforced with great clarity that propafenone is to be discontinued\par \par 3. Both Mrs. Mosquera and her daughter understand the importance of maintaining some type of  formal exercise         pattern and strict diet modifications.  Especially as regards carbohydrates\par \par 4. If WIGGINS returns, consider stress testing. \par \par 5.  Instructed patient to limit caffeine and alcohol intake\par \par 6.  Instructed patient about the absolute need for follow-up with sleep medicine regarding treatment of what is now severe obstructive sleep apnea.  She was a no-show for her last telehealth visit with pulmonary\par \par 7.  Reviewed in detail with the patient the relationship between her weight, her sleep apnea, her pulmonary hypertension, her atrial fibrillation.\par      \par 8.  Repeat echocardiogram in 1 year and office visit here in 6 months

## 2023-05-18 ENCOUNTER — LABORATORY RESULT (OUTPATIENT)
Age: 78
End: 2023-05-18

## 2023-05-18 ENCOUNTER — APPOINTMENT (OUTPATIENT)
Dept: RHEUMATOLOGY | Facility: CLINIC | Age: 78
End: 2023-05-18
Payer: MEDICARE

## 2023-05-18 VITALS
DIASTOLIC BLOOD PRESSURE: 68 MMHG | HEART RATE: 68 BPM | BODY MASS INDEX: 32.59 KG/M2 | WEIGHT: 166 LBS | RESPIRATION RATE: 17 BRPM | TEMPERATURE: 98 F | HEIGHT: 60 IN | SYSTOLIC BLOOD PRESSURE: 120 MMHG | OXYGEN SATURATION: 97 %

## 2023-05-18 DIAGNOSIS — G89.29 CERVICALGIA: ICD-10-CM

## 2023-05-18 DIAGNOSIS — M54.2 CERVICALGIA: ICD-10-CM

## 2023-05-18 DIAGNOSIS — R53.83 OTHER FATIGUE: ICD-10-CM

## 2023-05-18 PROCEDURE — 81003 URINALYSIS AUTO W/O SCOPE: CPT | Mod: QW

## 2023-05-18 PROCEDURE — 36415 COLL VENOUS BLD VENIPUNCTURE: CPT

## 2023-05-18 PROCEDURE — 99215 OFFICE O/P EST HI 40 MIN: CPT | Mod: 25

## 2023-05-18 PROCEDURE — G2212 PROLONG OUTPT/OFFICE VIS: CPT

## 2023-05-20 LAB
25(OH)D3 SERPL-MCNC: 62.1 NG/ML
ALBUMIN SERPL ELPH-MCNC: 4.2 G/DL
ALP BLD-CCNC: 87 U/L
ALT SERPL-CCNC: 9 U/L
ANION GAP SERPL CALC-SCNC: 10 MMOL/L
APPEARANCE: CLEAR
AST SERPL-CCNC: 22 U/L
BACTERIA: NEGATIVE /HPF
BILIRUB SERPL-MCNC: 1 MG/DL
BILIRUB UR QL STRIP: NEGATIVE
BILIRUBIN URINE: NEGATIVE
BLOOD URINE: ABNORMAL
BUN SERPL-MCNC: 11 MG/DL
CALCIUM SERPL-MCNC: 9.4 MG/DL
CALCIUM SERPL-MCNC: 9.4 MG/DL
CAST: 0 /LPF
CHLORIDE SERPL-SCNC: 107 MMOL/L
CK SERPL-CCNC: 50 U/L
CLARITY UR: CLEAR
CO2 SERPL-SCNC: 26 MMOL/L
COLLECTION METHOD: NORMAL
COLOR: YELLOW
CREAT SERPL-MCNC: 0.77 MG/DL
CRP SERPL-MCNC: <3 MG/L
EGFR: 78 ML/MIN/1.73M2
ENDOMYSIUM IGA SER QL: NEGATIVE
ENDOMYSIUM IGA TITR SER: NORMAL
EPITHELIAL CELLS: 3 /HPF
ERYTHROCYTE [SEDIMENTATION RATE] IN BLOOD BY WESTERGREN METHOD: 21 MM/HR
GLUCOSE QUALITATIVE U: NEGATIVE MG/DL
GLUCOSE SERPL-MCNC: 84 MG/DL
GLUCOSE UR-MCNC: NEGATIVE
HCG UR QL: 0.2 EU/DL
HGB UR QL STRIP.AUTO: NORMAL
KETONES UR-MCNC: NEGATIVE
KETONES URINE: NEGATIVE MG/DL
LDH SERPL-CCNC: 179 U/L
LEUKOCYTE ESTERASE UR QL STRIP: NORMAL
LEUKOCYTE ESTERASE URINE: ABNORMAL
MAGNESIUM SERPL-MCNC: 2.2 MG/DL
MICROSCOPIC-UA: NORMAL
NITRITE UR QL STRIP: NEGATIVE
NITRITE URINE: NEGATIVE
PARATHYROID HORMONE INTACT: 37 PG/ML
PH UR STRIP: 7
PH URINE: 7
PHOSPHATE SERPL-MCNC: 3 MG/DL
POTASSIUM SERPL-SCNC: 4.2 MMOL/L
PROT SERPL-MCNC: 7.8 G/DL
PROT UR STRIP-MCNC: NEGATIVE
PROTEIN URINE: NEGATIVE MG/DL
RED BLOOD CELLS URINE: 1 /HPF
REVIEW: NORMAL
SODIUM SERPL-SCNC: 143 MMOL/L
SP GR UR STRIP: 1.01
SPECIFIC GRAVITY URINE: 1.01
TSH SERPL-ACNC: 1.59 UIU/ML
UROBILINOGEN URINE: 0.2 MG/DL
WHITE BLOOD CELLS URINE: 3 /HPF

## 2023-05-20 RX ORDER — ADHESIVE TAPE 3"X 2.3 YD
50 MCG TAPE, NON-MEDICATED TOPICAL
Qty: 30 | Refills: 0 | Status: ACTIVE | COMMUNITY
Start: 2023-05-20

## 2023-05-20 RX ORDER — MULTIVITAMIN/IRON/FOLIC ACID 18MG-0.4MG
600-400 TABLET ORAL
Qty: 60 | Refills: 0 | Status: DISCONTINUED | COMMUNITY
End: 2023-05-20

## 2023-05-20 NOTE — ASSESSMENT
[FreeTextEntry1] : Impression: LORENA FELIX is a 78 year old woman who presents for an initial follow up for further evaluation of joint symptoms and rheumatic diseases including fibromyalgia, osteoarthritis, osteoporosis, and chronic low back pain, accompanied by daughter-in-law Malathi Mera,  seen in the presence of video  ELISEO (ID #[id number] ).\par  \par Note: Patient last seen September 2022\par \par Pain in both knees, especially prolonged walking, and low back pain with radiation to the bilateral lower extremities to the toes secondary to osteoarthritis, chronic low back pain with spinal stenosis and neuroforaminal stenosis, and fibromyalgia with some sleep disturbance and fatigue.  Was having dizziness and GI upset secondary to the gabapentin so she had discontinued it. Does not exercise. The patient discontinued her calcium supplement for unclear reasons.  Bone densitometry results revealed osteoporosis.   Recent MRI results of the LS spine reveal osteoarthritis, neuroforaminal stenosis, spinal stenosis,  with extensive discussion. Recent laboratory tests results reveal no significant changes or results, with extensive discussion. Patient denies rash or side effects with current medications. \par \par \par Plan: I reviewed  chart and previous records \par I reviewed recent lab results with patient and her daughter-in-law with extensive discussion--including the repeat cortisol which I ordered at her visit in September which she performed in October but I did not receive results until it was handed to me today--and was normal \par I reviewed recent Bone densitometry results with patient and her daughter-in-law with extensive discussion including my analysis of raw data. \par I reviewed recent LS MRI results with patient and her daughter-in-law with extensive discussion\par Laboratory tests ordered - see list below - with coordination of care\par Bilateral knees MRI ordered (no contraindications) - with coordination of care \par Diagnosis and prognosis discussed\par Continue current medications (other than those changed below)\par Lyrica 25 mg b.i.d.; if no better/side effects 7 days, increase to 50 mg b.i.d.; if no better/side effects after another 7 days, increase to 75 mg t.i.d. (possible side effects explained)\par Tizanidine 2 mg b.i.d. morning and supper and 4 mg h.s.(Possible side effects explained)\par Risedronate Sodium 150 mg q.1 month on an empty stomach with a large glass of water 30 minutes a.c breakfast (possible side effects explained). If patient's insurance does not cover a prescription of Risedronate, I will prescribe alternative alendronate 70 mg q.1 week on empty stomach with large glass of water 30 min a.c breakfast (possible side effects explained) \par Calcium 500 mg t.i.d. at the end of meals or 600 mg b.i.d end of meals (Possible side effects explained)\par Vitamin D 50,000 units once a week (Possible side effects explained)--daughter-in-law declined stating that it is not covered by the health insurance and it is too expensive\par OTC Vitamin D3 2,000 units q.d (Possible side effects explained)\par Daily exercise starting at 10 minutes per day, gradually increasing to at least 30 minutes per day - emphasized \par Back exercises--instruction sheet given and discussed --with extensive discussion\par Bilateral knee exercises--instruction sheet given and discussed--exercises demonstrated with extensive discussion\par Physical therapy with special attention to the bilateral knees and low back--with coordination of care\par Return visit 3 months\par Total time for this office visit, including face-to-face time and non-face-to-face time, 85 minutes--- including review of the chart and previous records, review of previous lab results with extensive discussion with the patient and her daughter-in-law, ordering lab tests with coordination of care, review of recent imaging reports/x-ray/MRI results with extensive discussion with the patient and her daughter-in-law, review of the bone densitometry results with my analysis of the raw data with extensive discussion with the patient and her daughter-in-law, ordering of new MRI both knees with coordination of care, detailed medication history, ordering of physical therapy with coordination of care, providing back exercises with extensive discussion, providing knee exercises with demonstration of the exercises with extensive discussion, review of medications going forward with their possible side effects, reviewed the impact of the patient's rheumatic disease on their other medical problems, reviewed the impact of the patient's other medical problems on their rheumatic disease

## 2023-05-20 NOTE — HISTORY OF PRESENT ILLNESS
[FreeTextEntry1] : LORENA FELIX is a 78 year old woman who presents for an initial follow up for further evaluation of joint symptoms and rheumatic diseases, accompanied by daughter-in-law Malathi Mera,  seen in the presence of video  Peter (ID #[id number]).\par  \par Note: Patient last seen September 2022\par \par Pain in both knees, especially prolonged walking, and low back pain with radiation to the bilateral lower extremities to the toes. With joint swelling in the bilateral feet. Some sleep disturbance and fatigue, is using CPAP with no relief.  She is not exercising.  Was having dizziness and GI upset secondary to the gabapentin so she discontinued it.  For unclear reasons, she is not taking calcium supplement.  Patient denies rash with her medications. \par \par PMH\par Sleep apnea--using CPAP\par Chronic shortness of breath--felt secondary to cardiac disease\par November 2022--thyroid nodule fine-needle aspiration--benign\par \par

## 2023-05-20 NOTE — REASON FOR VISIT
[Follow-Up: _____] : a [unfilled] follow-up visit [Family Member] : family member [[employer] ] : provided by [employer]   [Interpreters_IDNumber] : [id number] [Interpreters_FullName] : Peter [TWNoteComboBox1] : [place]

## 2023-05-20 NOTE — CONSULT LETTER
[Consult Letter:] : I had the pleasure of evaluating your patient, [unfilled]. [Please see my note below.] : Please see my note below. [Consult Closing:] : Thank you very much for allowing me to participate in the care of this patient.  If you have any questions, please do not hesitate to contact me. [Sincerely,] : Sincerely, [DrBrunilda ___] : Dr. ARNETT [Dear  ___] : Dear  [unfilled], [FreeTextEntry3] : Alexander\par Myron I. Kleiner, M.D., FACR \par Chief, Division of Rheumatology\par Department of Medicine \par [facility]  [DrBrunilda  ___] : Dr. ARNETT

## 2023-05-20 NOTE — ADDENDUM
[FreeTextEntry1] : I, Eddie Elizabetha, acted solely as a scribe for Dr. Myron I. Kleiner, MD. on 05/18/2023 .

## 2023-05-25 ENCOUNTER — OFFICE (OUTPATIENT)
Dept: URBAN - METROPOLITAN AREA CLINIC 115 | Facility: CLINIC | Age: 78
Setting detail: OPHTHALMOLOGY
End: 2023-05-25
Payer: COMMERCIAL

## 2023-05-25 DIAGNOSIS — H16.223: ICD-10-CM

## 2023-05-25 DIAGNOSIS — H35.033: ICD-10-CM

## 2023-05-25 DIAGNOSIS — H40.1111: ICD-10-CM

## 2023-05-25 PROCEDURE — 92012 INTRM OPH EXAM EST PATIENT: CPT | Performed by: OPHTHALMOLOGY

## 2023-05-25 PROCEDURE — 92250 FUNDUS PHOTOGRAPHY W/I&R: CPT | Performed by: OPHTHALMOLOGY

## 2023-05-25 ASSESSMENT — KERATOMETRY
OS_AXISANGLE_DEGREES: 159
OD_AXISANGLE_DEGREES: 144
OD_K2POWER_DIOPTERS: 44.00
OS_K1POWER_DIOPTERS: 42.75
OD_K1POWER_DIOPTERS: 42.75
OS_K2POWER_DIOPTERS: 43.75

## 2023-05-25 ASSESSMENT — SPHEQUIV_DERIVED
OS_SPHEQUIV: 0.5
OD_SPHEQUIV: 0.375

## 2023-05-25 ASSESSMENT — TONOMETRY
OS_IOP_MMHG: 11
OD_IOP_MMHG: 11

## 2023-05-25 ASSESSMENT — REFRACTION_CURRENTRX
OS_OVR_VA: 20/
OS_ADD: +2.00
OS_ADD: +2.75
OD_AXIS: 075
OD_CYLINDER: -0.50
OD_AXIS: 068
OD_SPHERE: +3.50
OD_OVR_VA: 20/
OS_AXIS: 067
OS_VPRISM_DIRECTION: PROGS
OS_CYLINDER: -2.00
OS_CYLINDER: -0.75
OD_VPRISM_DIRECTION: PROGS
OS_SPHERE: +3.75
OS_AXIS: 066
OD_ADD: +1.50
OD_ADD: +2.25
OD_CYLINDER: -2.50
OD_VPRISM_DIRECTION: PROGS
OD_AXIS: 066
OS_ADD: +1.50
OS_AXIS: 072
OS_CYLINDER: -2.00
OS_AXIS: 088
OS_ADD: +2.25
OS_CYLINDER: -0.75
OD_AXIS: 082
OS_VPRISM_DIRECTION: PROGS
OD_ADD: +2.75
OD_VPRISM_DIRECTION: PROGS
OD_OVR_VA: 20/
OD_SPHERE: +0.75
OD_ADD: +2.00
OD_OVR_VA: 20/
OD_CYLINDER: -1.00
OS_OVR_VA: 20/
OD_CYLINDER: -3.00
OD_SPHERE: +3.00
OD_SPHERE: +1.00
OS_SPHERE: +0.25
OS_SPHERE: +0.25
OS_VPRISM_DIRECTION: PROGS
OS_SPHERE: +3.75
OS_OVR_VA: 20/

## 2023-05-25 ASSESSMENT — LID POSITION - ECTROPION: OD_ECTROPION: RLL 2+

## 2023-05-25 ASSESSMENT — PACHYMETRY
OD_CT_CORRECTION: 1
OS_CT_CORRECTION: -1
OS_CT_UM: 555
OD_CT_UM: 523

## 2023-05-25 ASSESSMENT — REFRACTION_AUTOREFRACTION
OS_CYLINDER: -2.00
OD_AXIS: 069
OD_CYLINDER: -1.25
OS_SPHERE: +1.50
OS_AXIS: 081
OD_SPHERE: +1.00

## 2023-05-25 ASSESSMENT — VISUAL ACUITY
OS_BCVA: 20/30-
OD_BCVA: 20/25

## 2023-05-25 ASSESSMENT — AXIALLENGTH_DERIVED
OD_AL: 23.4921
OS_AL: 23.4894

## 2023-05-25 ASSESSMENT — CONFRONTATIONAL VISUAL FIELD TEST (CVF)
OD_FINDINGS: FULL
OS_FINDINGS: FULL

## 2023-05-25 ASSESSMENT — LID POSITION - ENTROPION
OD_ENTROPION: RUL 2+
OS_ENTROPION: LUL 2+

## 2023-05-25 ASSESSMENT — LID EXAM ASSESSMENTS: OD_TRICHIASIS: RUL 2+

## 2023-05-26 ENCOUNTER — OFFICE (OUTPATIENT)
Dept: URBAN - METROPOLITAN AREA CLINIC 94 | Facility: CLINIC | Age: 78
Setting detail: OPHTHALMOLOGY
End: 2023-05-26
Payer: COMMERCIAL

## 2023-05-26 DIAGNOSIS — H02.031: ICD-10-CM

## 2023-05-26 DIAGNOSIS — H02.132: ICD-10-CM

## 2023-05-26 DIAGNOSIS — H16.223: ICD-10-CM

## 2023-05-26 DIAGNOSIS — H02.034: ICD-10-CM

## 2023-05-26 PROBLEM — H04.551 NASOLACRIMAL DUCT OBSTRUCTION ACQUIRED; RIGHT EYE, LEFT EYE, BOTH EYES: Status: ACTIVE | Noted: 2023-05-26

## 2023-05-26 PROBLEM — H04.553 NASOLACRIMAL DUCT OBSTRUCTION ACQUIRED; RIGHT EYE, LEFT EYE, BOTH EYES: Status: ACTIVE | Noted: 2023-05-26

## 2023-05-26 PROBLEM — H04.552 NASOLACRIMAL DUCT OBSTRUCTION ACQUIRED; RIGHT EYE, LEFT EYE, BOTH EYES: Status: ACTIVE | Noted: 2023-05-26

## 2023-05-26 PROCEDURE — 68810 PROBE NASOLACRIMAL DUCT: CPT | Performed by: OPHTHALMOLOGY

## 2023-05-26 PROCEDURE — 92014 COMPRE OPH EXAM EST PT 1/>: CPT | Performed by: OPHTHALMOLOGY

## 2023-05-26 PROCEDURE — 83861 MICROFLUID ANALY TEARS: CPT | Performed by: OPHTHALMOLOGY

## 2023-05-26 ASSESSMENT — AXIALLENGTH_DERIVED
OS_AL: 23.4894
OD_AL: 23.4921

## 2023-05-26 ASSESSMENT — KERATOMETRY
OS_K1POWER_DIOPTERS: 42.75
OS_K2POWER_DIOPTERS: 43.75
OS_AXISANGLE_DEGREES: 159
OD_AXISANGLE_DEGREES: 144
OD_K1POWER_DIOPTERS: 42.75
OD_K2POWER_DIOPTERS: 44.00

## 2023-05-26 ASSESSMENT — REFRACTION_CURRENTRX
OS_ADD: +2.00
OD_ADD: +1.50
OS_VPRISM_DIRECTION: PROGS
OS_ADD: +2.75
OS_VPRISM_DIRECTION: PROGS
OD_VPRISM_DIRECTION: PROGS
OS_CYLINDER: -2.00
OS_OVR_VA: 20/
OD_CYLINDER: -3.00
OS_OVR_VA: 20/
OD_CYLINDER: -1.00
OS_AXIS: 066
OD_SPHERE: +1.00
OS_SPHERE: +0.25
OD_OVR_VA: 20/
OD_AXIS: 082
OS_AXIS: 067
OD_OVR_VA: 20/
OD_SPHERE: +0.75
OS_AXIS: 088
OS_ADD: +1.50
OS_VPRISM_DIRECTION: PROGS
OD_SPHERE: +3.00
OD_AXIS: 066
OS_OVR_VA: 20/
OD_CYLINDER: -0.50
OD_VPRISM_DIRECTION: PROGS
OS_SPHERE: +0.25
OD_OVR_VA: 20/
OD_CYLINDER: -2.50
OS_SPHERE: +3.75
OD_AXIS: 068
OS_CYLINDER: -0.75
OS_CYLINDER: -2.00
OD_ADD: +2.25
OS_CYLINDER: -0.75
OD_ADD: +2.75
OS_SPHERE: +3.75
OD_ADD: +2.00
OS_ADD: +2.25
OD_VPRISM_DIRECTION: PROGS
OD_AXIS: 075
OS_AXIS: 072
OD_SPHERE: +3.50

## 2023-05-26 ASSESSMENT — CONFRONTATIONAL VISUAL FIELD TEST (CVF)
OS_FINDINGS: FULL
OD_FINDINGS: FULL

## 2023-05-26 ASSESSMENT — REFRACTION_AUTOREFRACTION
OD_CYLINDER: -1.25
OD_AXIS: 069
OS_SPHERE: +1.50
OD_SPHERE: +1.00
OS_CYLINDER: -2.00
OS_AXIS: 081

## 2023-05-26 ASSESSMENT — LID POSITION - ENTROPION
OS_ENTROPION: LUL 2+
OD_ENTROPION: RUL 2+

## 2023-05-26 ASSESSMENT — SPHEQUIV_DERIVED
OS_SPHEQUIV: 0.5
OD_SPHEQUIV: 0.375

## 2023-05-26 ASSESSMENT — LID POSITION - ECTROPION: OD_ECTROPION: RLL 2+

## 2023-05-26 ASSESSMENT — VISUAL ACUITY
OS_BCVA: 20/20
OD_BCVA: 20/25

## 2023-05-26 ASSESSMENT — LID EXAM ASSESSMENTS: OD_TRICHIASIS: RUL 2+

## 2023-06-03 LAB
ALBUMIN MFR SERPL ELPH: 50.6 %
ALBUMIN SERPL-MCNC: 3.9 G/DL
ALBUMIN/GLOB SERPL: 1 RATIO
ALPHA1 GLOB MFR SERPL ELPH: 3.3 %
ALPHA1 GLOB SERPL ELPH-MCNC: 0.3 G/DL
ALPHA2 GLOB MFR SERPL ELPH: 10.7 %
ALPHA2 GLOB SERPL ELPH-MCNC: 0.8 G/DL
B-GLOBULIN MFR SERPL ELPH: 16.6 %
B-GLOBULIN SERPL ELPH-MCNC: 1.3 G/DL
DEPRECATED KAPPA LC FREE/LAMBDA SER: 1.45 RATIO
GAMMA GLOB FLD ELPH-MCNC: 1.5 G/DL
GAMMA GLOB MFR SERPL ELPH: 18.8 %
GLIADIN IGA SER QL: 12.2 UNITS
GLIADIN IGG SER QL: <5 UNITS
GLIADIN PEPTIDE IGA SER-ACNC: NEGATIVE
GLIADIN PEPTIDE IGG SER-ACNC: NEGATIVE
IGA SER QL IEP: 719 MG/DL
IGG SER QL IEP: 1362 MG/DL
IGM SER QL IEP: 154 MG/DL
INTERPRETATION SERPL IEP-IMP: NORMAL
KAPPA LC CSF-MCNC: 2.57 MG/DL
KAPPA LC SERPL-MCNC: 3.72 MG/DL
M PROTEIN SPEC IFE-MCNC: NORMAL
PROT SERPL-MCNC: 7.8 G/DL
PROT SERPL-MCNC: 7.8 G/DL
TTG IGA SER IA-ACNC: <1.2 U/ML
TTG IGA SER-ACNC: NEGATIVE
TTG IGG SER IA-ACNC: 4.5 U/ML
TTG IGG SER IA-ACNC: NEGATIVE

## 2023-06-06 ENCOUNTER — NON-APPOINTMENT (OUTPATIENT)
Age: 78
End: 2023-06-06

## 2023-06-16 ENCOUNTER — APPOINTMENT (OUTPATIENT)
Dept: CARDIOLOGY | Facility: CLINIC | Age: 78
End: 2023-06-16
Payer: MEDICARE

## 2023-06-16 VITALS
HEART RATE: 58 BPM | BODY MASS INDEX: 32.39 KG/M2 | SYSTOLIC BLOOD PRESSURE: 118 MMHG | WEIGHT: 165 LBS | DIASTOLIC BLOOD PRESSURE: 60 MMHG | HEIGHT: 60 IN

## 2023-06-16 PROCEDURE — 99214 OFFICE O/P EST MOD 30 MIN: CPT | Mod: 25

## 2023-06-16 PROCEDURE — 93000 ELECTROCARDIOGRAM COMPLETE: CPT

## 2023-06-16 NOTE — ASSESSMENT
[FreeTextEntry1] : ECG-Atrial fibrillation with a ventricular rate of 68  bpm.  Poor  R wave progression.  Consider anteroseptal infarct.                                                         \par Lab data \par ------9/19/20---11/23/21--10/7/22\par Cho---173--------151--------153\par HDL----67---------71---------60\par LDL----90---------70---------80\par Trig----73---------52---------57\par \par Home sleep study 5/14/2021:\par Respiratory event index 8.7 consistent with mild obstructive sleep apnea.\par Lowest saturation 75%\par AutoPap titration was recommended\par \par Attended sleep study 7/12/2021:\par AHI equals 65.6\par Richard O2 63%\par Impression severe obstructive sleep apnea\par \par Echocardiogram 12/12/2022:\par Normal for size and function LVEF 55 to 60%\par RVH with normal function\par Moderate left atrial enlargement\par Mild MR and severe TR\par Severe pulmonary hypertension 62 mmHg\par \par Echocardiogram 2/17/2021:\par LVEF 55 to 60%\par Severe tricuspid regurgitation\par Pulmonary systolic pressure is 58.\par Biatrial enlargement.\par Mild RVH.\par \par 9/26/19 echo \par NL LV  70-75%\par Borderline LVH\par Mod. TR\par Mild- Mod. MR\par Mildly elevated PAP 37.7 mmHg\par Mild aortic valve sclerosis.\par \par Carotid disease \par Right- Mild plaque at the distal RCCA, minimal soft plaque in the bulb and prox. MERY\par Left-  Mild plaque at the distal LCCA, bulb, and prox. MERY\par \par Impression\par 78-year-old female with severe pulm hypertension, severe obstructive sleep apnea, persistent atrial fibrillation, presenting for evaluation before a bladder biopsy.\par \par 1. AFIB initially paroxysmal transitioning back and forth in sinus and now seemingly in persistent A. fib\par    An earlier cardioversion temporarily converted her to SR initially. \par    Holter monitor demonstrated persistent atrial fibrillation with an average heart rate of 82 and a range of . \par - Anticoagulated on Eliquis.\par \par 2. Last Melissa sestamibi stress was negative for ischemia. Does have mild  WIGGINS with chest pressure  but probably     related to deconditioning and  moderately severe pulmonary hypertension  \par \par 3. No family hx that she is aware of other then a CVA in her grandmother\par \par 4.  Evidence of severe obstructive sleep apnea on the attended study as opposed to mild on the home sleep study\par      On CPAP x 6  month with pulmonary follow-up shortly.\par \par 5. BP today controlled\par \par 6. b/l carotid disease , mild. \par \par 7.  Echocardiography demonstrated preserved EF but severe TR, pulmonary hypertension and biatrial      enlargement.  There is also suggestion of mild RVH.  All relatively unchanged.\par \par 8.   Hyperlipidemia: Appears to be adequately controlled.\par \par Plan\par 1.  Follow-up with sleep medicine.  Would hope that we would see an improvement in the pulmonary hypertension with treatment of the sleep apnea.  \par \par 2.  For smoother rate control of AFIB, continue metoprolol ER 50 QD\par     \par 3. Both Mrs. Mosquera and her daughter understand the importance of maintaining some type of  formal exercise         pattern and strict diet modifications.  Especially as regards carbohydrates\par \par 4. If WIGGINS returns, consider stress testing. \par \par 5.  Instructed patient to limit caffeine and alcohol intake\par \par 6.  Instructed patient about the absolute need for follow-up with sleep medicine regarding treatment of what is now severe obstructive sleep apnea.  She was a no-show for her last telehealth visit with pulmonary\par \par 7.  Reviewed in detail with the patient the relationship between her weight, her sleep apnea, her pulmonary hypertension, her atrial fibrillation.\par      \par 8.  Repeat echocardiogram in 6 months to reassess pulmonary hypertension\par \par 9. There is no cardiac contraindication to proceeding with a bladder biopsy at this time.  From a cardiovascular perspective the patient is in his good condition as possible.  Overall risk would be considered a mild to moderately increased.\par Patient will hold Eliquis 48 hours before the procedure.  Please advise the patient if she is to continue holding Eliquis post procedurally.\par Metoprolol be taken with sips of water including a.m. of surgery as per regular schedule.

## 2023-06-16 NOTE — REASON FOR VISIT
[FreeTextEntry1] : 78   year old Faroese speaking patient native of [facility]  presenting for cardiac re evaluation in anticipation of a bladder biopsy.\par \par Problems include:\par 1.  Severe pulmonary hypertension\par 2.  Severe obstructive sleep apnea AHI greater than 60; noncompliant with regular CPAP\par 3.  Persistent atrial fibrillation (  s/p previous  DCC ( 6/19/20).\par \par Holter monitor to assess her rate/rhythm revealed max rate of 111 bpm and she was converted to Toprol-XL 50 mg daily.\par \par Mentions some balance issues which are  chronic.\par \par 7/12/21 HST     AHI-  65.6 compatible with severe obstructive sleep apnea with sai O2 63%\par Being managed by Dr Brunilda Adair and is compliant with CPAP. Problem that the disc cannot be read to assess efficacy and compliance\par \par Previous cardiac work up through [facility]:\par  Underwent external cardioversion on 6/19/20 which converted her temporarily to SR. \par \par Apparently there might be some non-compliance issues in the past  with her meds ( rhymol and Eliquis)\par Intolerance of Flecanide due to dizzy spells\par \par  Pharmacologic stress test from 9/18/19 demonstrated normal rest-stress myocardial perfusion, ECG negative for ischemia and EF 74%\par \par 9/26/19 echo \par NL LV  70-75%\par Borderline LVH\par Mod. TR\par Mild- Mod. MR\par Mildly elevated PAP 37.7 mmHg\par Mild aortic valve sclerosis.\par \par Carotid Doppler\par 9/26/19\par Right- Mild of the distal RCCA, minimal soft plaque in the bulb and prox. MERY\par Left-  Mild plaque at the distal LCCA, buld and prox. MERY\par \par Nuclear stress test 9/18/2019:\par Rest stress myocardial perfusion.\par No evidence of ischemia\par Normal gated left ventricular systolic function.

## 2023-06-19 ENCOUNTER — OFFICE (OUTPATIENT)
Dept: URBAN - METROPOLITAN AREA CLINIC 115 | Facility: CLINIC | Age: 78
Setting detail: OPHTHALMOLOGY
End: 2023-06-19
Payer: COMMERCIAL

## 2023-06-19 DIAGNOSIS — H02.011: ICD-10-CM

## 2023-06-19 DIAGNOSIS — H16.223: ICD-10-CM

## 2023-06-19 DIAGNOSIS — H04.552: ICD-10-CM

## 2023-06-19 DIAGNOSIS — H04.551: ICD-10-CM

## 2023-06-19 DIAGNOSIS — H52.4: ICD-10-CM

## 2023-06-19 DIAGNOSIS — H02.031: ICD-10-CM

## 2023-06-19 DIAGNOSIS — H02.132: ICD-10-CM

## 2023-06-19 DIAGNOSIS — H02.034: ICD-10-CM

## 2023-06-19 DIAGNOSIS — H35.3132: ICD-10-CM

## 2023-06-19 DIAGNOSIS — H40.1111: ICD-10-CM

## 2023-06-19 DIAGNOSIS — H35.033: ICD-10-CM

## 2023-06-19 DIAGNOSIS — H04.553: ICD-10-CM

## 2023-06-19 PROCEDURE — 92015 DETERMINE REFRACTIVE STATE: CPT | Performed by: OPHTHALMOLOGY

## 2023-06-19 PROCEDURE — 92250 FUNDUS PHOTOGRAPHY W/I&R: CPT | Performed by: OPHTHALMOLOGY

## 2023-06-19 PROCEDURE — 99213 OFFICE O/P EST LOW 20 MIN: CPT | Performed by: OPHTHALMOLOGY

## 2023-06-19 ASSESSMENT — REFRACTION_CURRENTRX
OS_SPHERE: +0.25
OD_AXIS: 077
OS_ADD: +2.00
OS_ADD: +3.00
OD_AXIS: 066
OD_OVR_VA: 20/
OD_VPRISM_DIRECTION: PROGS
OD_SPHERE: +3.50
OS_VPRISM_DIRECTION: PROGS
OS_SPHERE: +0.25
OS_SPHERE: +3.75
OD_CYLINDER: -3.00
OS_AXIS: 090
OD_OVR_VA: 20/
OD_SPHERE: +3.00
OD_VPRISM_DIRECTION: PROGS
OS_CYLINDER: -2.00
OD_AXIS: 075
OS_ADD: +1.50
OD_ADD: +1.50
OS_VPRISM_DIRECTION: PROGS
OD_ADD: +2.75
OS_CYLINDER: -2.00
OD_OVR_VA: 20/
OS_ADD: +2.25
OD_CYLINDER: -0.50
OD_VPRISM_DIRECTION: PROGS
OS_VPRISM_DIRECTION: PROGS
OS_CYLINDER: -0.75
OD_SPHERE: +0.75
OD_ADD: +2.00
OD_SPHERE: +0.50
OD_AXIS: 082
OS_OVR_VA: 20/
OS_SPHERE: +3.75
OS_OVR_VA: 20/
OD_CYLINDER: -2.50
OD_CYLINDER: -0.50
OS_AXIS: 066
OS_AXIS: 067
OS_CYLINDER: -1.00
OS_AXIS: 072
OS_OVR_VA: 20/
OD_ADD: +2.25

## 2023-06-19 ASSESSMENT — TONOMETRY
OD_IOP_MMHG: 12
OS_IOP_MMHG: 12

## 2023-06-19 ASSESSMENT — LID POSITION - ENTROPION
OS_ENTROPION: LUL 2+
OD_ENTROPION: RUL 2+

## 2023-06-19 ASSESSMENT — REFRACTION_AUTOREFRACTION
OS_AXIS: 090
OD_SPHERE: +1.00
OD_AXIS: 066
OS_CYLINDER: -1.25
OS_SPHERE: +0.75
OD_CYLINDER: -1.25

## 2023-06-19 ASSESSMENT — REFRACTION_MANIFEST
OS_ADD: +2.75
OD_SPHERE: +0.50
OS_SPHERE: +0.25
OS_AXIS: 90
OS_VA1: 20/25
OD_CYLINDER: -0.75
OS_CYLINDER: -0.75
OD_AXIS: 65
OD_VA1: 20/25
OD_ADD: +2.75

## 2023-06-19 ASSESSMENT — PACHYMETRY
OD_CT_CORRECTION: 1
OS_CT_CORRECTION: -1
OS_CT_UM: 555
OD_CT_UM: 523

## 2023-06-19 ASSESSMENT — KERATOMETRY
OS_K2POWER_DIOPTERS: 43.75
OS_AXISANGLE_DEGREES: 159
OD_AXISANGLE_DEGREES: 144
OS_K1POWER_DIOPTERS: 42.75
OD_K1POWER_DIOPTERS: 42.75
OD_K2POWER_DIOPTERS: 44.00

## 2023-06-19 ASSESSMENT — SPHEQUIV_DERIVED
OS_SPHEQUIV: 0.125
OD_SPHEQUIV: 0.375
OS_SPHEQUIV: -0.125
OD_SPHEQUIV: 0.125

## 2023-06-19 ASSESSMENT — LID EXAM ASSESSMENTS: OD_TRICHIASIS: RUL 2+

## 2023-06-19 ASSESSMENT — CONFRONTATIONAL VISUAL FIELD TEST (CVF)
OD_FINDINGS: FULL
OS_FINDINGS: FULL

## 2023-06-19 ASSESSMENT — VISUAL ACUITY
OD_BCVA: 20/25-1
OS_BCVA: 20/30-2

## 2023-06-19 ASSESSMENT — AXIALLENGTH_DERIVED
OS_AL: 23.7333
OS_AL: 23.6351
OD_AL: 23.4921
OD_AL: 23.5891

## 2023-06-19 ASSESSMENT — LID POSITION - ECTROPION: OD_ECTROPION: RLL 2+

## 2023-07-07 ENCOUNTER — APPOINTMENT (OUTPATIENT)
Dept: PULMONOLOGY | Facility: CLINIC | Age: 78
End: 2023-07-07
Payer: MEDICARE

## 2023-07-07 VITALS — HEART RATE: 75 BPM | RESPIRATION RATE: 17 BRPM | OXYGEN SATURATION: 98 %

## 2023-07-07 VITALS — DIASTOLIC BLOOD PRESSURE: 70 MMHG | SYSTOLIC BLOOD PRESSURE: 120 MMHG

## 2023-07-07 VITALS — BODY MASS INDEX: 31.34 KG/M2 | WEIGHT: 166 LBS | HEIGHT: 61 IN

## 2023-07-07 DIAGNOSIS — Z01.818 ENCOUNTER FOR OTHER PREPROCEDURAL EXAMINATION: ICD-10-CM

## 2023-07-07 PROCEDURE — 94010 BREATHING CAPACITY TEST: CPT

## 2023-07-07 PROCEDURE — 99214 OFFICE O/P EST MOD 30 MIN: CPT | Mod: 25

## 2023-07-07 NOTE — REASON FOR VISIT
[Initial] : an initial visit [Sleep Apnea] : sleep apnea [COPD] : COPD [Ad Hoc ] : provided by an ad hoc  [Family Member] : family member [Interpreters_FullName] : Sommer Galo [Interpreters_Relationshiptopatient] : LPN [TWNoteComboBox1] : [place] Island[place]

## 2023-07-07 NOTE — HISTORY OF PRESENT ILLNESS
[Never] : never [Obstructive Sleep Apnea] : obstructive sleep apnea [Lab] : lab [APAP:] : APAP [TextBox_4] : 78-year-old female seen today for preop evaluation for proposed cystoscopy with bladder biopsy.\par \par History of pulmonary hypertension\par History negative for leg edema, paroxysmal nocturnal dyspnea or orthopnea. [Awakes Unrefreshed] : does not awaken unrefreshed [Awakes with Dry Mouth] : does not awaken with dry mouth [Awakes with Headache] : does not awaken with headache [Snoring] : no snoring [Witnessed Apneas] : no witnessed apneas [TextBox_77] : 2am [TextBox_79] : 6am [TextBox_81] : 15 [TextBox_85] : 7 [TextBox_100] : 7/21/21 [TextBox_108] : 65.6 [TextBox_112] : 6.8 [TextBox_116] : 63 [TextBox_120] : Rem Index 85.3 [TextBox_125] : 4-16 [TextBox_127] : 6/8/2023 [TextBox_129] : 7/7/2023 [TextBox_133] : 13.3 [TextBox_147] : 5 [TextBox_158] : [facility] [TextBox_162] : 11/4/2021 [TextBox_165] : Patient remains poorly compliant without specific complaint\par  [ESS] : 10

## 2023-07-07 NOTE — END OF VISIT
[Time Spent: ___ minutes] : I have spent [unfilled] minutes of time on the encounter. [FreeTextEntry3] : Visit via an .

## 2023-07-07 NOTE — DISCUSSION/SUMMARY
[Obstructive Sleep Apnea] : obstructive sleep apnea [Severe] : severe [Unchanged] : unchanged [Alcohol Avoidance] : alcohol avoidance [Sedative Avoidance] : sedative avoidance [Weight Loss Program] : weight loss program [CPAP] : CPAP [Patient] : discussed with the patient [Family] : discussed with the patient's family [FreeTextEntry1] : No pulmonary contraindication to her proposed procedure\par \par Findings on CAT scan consistent with prior postinflammatory changes.  Region of bronchiolitis consistent with inflammation.  Follow-up CT March 2024.  Patient asymptomatic\par \par Findings on chest CT consistent with postinflammatory changes.  Possibly occupational induced.\par \par Complaints of dyspnea most likely on the basis of pulmonary hypertension, Deconditioning [de-identified] : Noncompliant [de-identified] : The pathophysiology of sleep was explained to the patient in detail. Inclusive of this was the reasoning behind and the expected response to positive airway pressure therapy. Compliance was outlined including further followup

## 2023-08-16 ENCOUNTER — APPOINTMENT (OUTPATIENT)
Dept: RHEUMATOLOGY | Facility: CLINIC | Age: 78
End: 2023-08-16
Payer: MEDICARE

## 2023-08-16 VITALS
DIASTOLIC BLOOD PRESSURE: 70 MMHG | SYSTOLIC BLOOD PRESSURE: 130 MMHG | OXYGEN SATURATION: 98 % | TEMPERATURE: 97.4 F | HEART RATE: 63 BPM

## 2023-08-16 DIAGNOSIS — M54.41 LUMBAGO WITH SCIATICA, LEFT SIDE: ICD-10-CM

## 2023-08-16 DIAGNOSIS — M81.0 AGE-RELATED OSTEOPOROSIS W/OUT CURRENT PATHOLOGICAL FRACTURE: ICD-10-CM

## 2023-08-16 DIAGNOSIS — M48.07 SPINAL STENOSIS, LUMBOSACRAL REGION: ICD-10-CM

## 2023-08-16 DIAGNOSIS — G89.29 PAIN IN RIGHT KNEE: ICD-10-CM

## 2023-08-16 DIAGNOSIS — M15.9 POLYOSTEOARTHRITIS, UNSPECIFIED: ICD-10-CM

## 2023-08-16 DIAGNOSIS — M79.7 FIBROMYALGIA: ICD-10-CM

## 2023-08-16 DIAGNOSIS — G89.29 LUMBAGO WITH SCIATICA, LEFT SIDE: ICD-10-CM

## 2023-08-16 DIAGNOSIS — S83.206A UNSPECIFIED TEAR OF UNSPECIFIED MENISCUS, CURRENT INJURY, RIGHT KNEE, INITIAL ENCOUNTER: ICD-10-CM

## 2023-08-16 DIAGNOSIS — M54.42 LUMBAGO WITH SCIATICA, LEFT SIDE: ICD-10-CM

## 2023-08-16 DIAGNOSIS — M25.561 PAIN IN RIGHT KNEE: ICD-10-CM

## 2023-08-16 DIAGNOSIS — M25.562 PAIN IN RIGHT KNEE: ICD-10-CM

## 2023-08-16 PROCEDURE — 81003 URINALYSIS AUTO W/O SCOPE: CPT | Mod: QW

## 2023-08-16 PROCEDURE — 36415 COLL VENOUS BLD VENIPUNCTURE: CPT

## 2023-08-16 PROCEDURE — 99214 OFFICE O/P EST MOD 30 MIN: CPT | Mod: 25

## 2023-08-20 LAB
ALBUMIN SERPL ELPH-MCNC: 4 G/DL
ALP BLD-CCNC: 69 U/L
ALT SERPL-CCNC: 13 U/L
ANION GAP SERPL CALC-SCNC: 11 MMOL/L
APPEARANCE: CLEAR
AST SERPL-CCNC: 30 U/L
BACTERIA: NEGATIVE /HPF
BILIRUB SERPL-MCNC: 1.2 MG/DL
BILIRUB UR QL STRIP: NORMAL
BILIRUBIN URINE: NEGATIVE
BLOOD URINE: ABNORMAL
BUN SERPL-MCNC: 12 MG/DL
CALCIUM SERPL-MCNC: 9.1 MG/DL
CAST: 1 /LPF
CHLORIDE SERPL-SCNC: 107 MMOL/L
CLARITY UR: NORMAL
CO2 SERPL-SCNC: 24 MMOL/L
COLLECTION METHOD: NORMAL
COLOR: NORMAL
CREAT SERPL-MCNC: 0.76 MG/DL
CRP SERPL-MCNC: <3 MG/L
EGFR: 79 ML/MIN/1.73M2
EPITHELIAL CELLS: 4 /HPF
ERYTHROCYTE [SEDIMENTATION RATE] IN BLOOD BY WESTERGREN METHOD: 19 MM/HR
GLUCOSE QUALITATIVE U: NEGATIVE MG/DL
GLUCOSE SERPL-MCNC: 93 MG/DL
GLUCOSE UR-MCNC: NORMAL
HCG UR QL: 1 EU/DL
HGB UR QL STRIP.AUTO: NORMAL
KETONES UR-MCNC: NORMAL
KETONES URINE: NEGATIVE MG/DL
LDH SERPL-CCNC: 181 U/L
LEUKOCYTE ESTERASE UR QL STRIP: NORMAL
LEUKOCYTE ESTERASE URINE: ABNORMAL
MICROSCOPIC-UA: NORMAL
NITRITE UR QL STRIP: NORMAL
NITRITE URINE: NEGATIVE
PH UR STRIP: 5.5
PH URINE: 5.5
PHOSPHATE SERPL-MCNC: 2.6 MG/DL
POTASSIUM SERPL-SCNC: 4.1 MMOL/L
PROT SERPL-MCNC: 7.4 G/DL
PROT UR STRIP-MCNC: NORMAL
PROTEIN URINE: NEGATIVE MG/DL
RED BLOOD CELLS URINE: 7 /HPF
SODIUM SERPL-SCNC: 142 MMOL/L
SP GR UR STRIP: 1.03
SPECIFIC GRAVITY URINE: 1.02
UROBILINOGEN URINE: 1 MG/DL
WHITE BLOOD CELLS URINE: 13 /HPF

## 2023-08-20 RX ORDER — PREGABALIN 25 MG/1
25 CAPSULE ORAL
Qty: 180 | Refills: 3 | Status: ACTIVE | COMMUNITY
Start: 2023-05-20 | End: 1900-01-01

## 2023-08-20 RX ORDER — CALCIUM 500 MG
500 TABLET ORAL
Qty: 90 | Refills: 3 | Status: DISCONTINUED | COMMUNITY
Start: 2023-05-20 | End: 2023-08-20

## 2023-08-20 RX ORDER — RISEDRONATE SODIUM 150 MG/1
150 TABLET, FILM COATED ORAL
Qty: 1 | Refills: 3 | Status: ACTIVE | COMMUNITY
Start: 2023-05-20 | End: 1900-01-01

## 2023-08-20 RX ORDER — METHOCARBAMOL 500 MG/1
500 TABLET, FILM COATED ORAL
Qty: 90 | Refills: 3 | Status: ACTIVE | COMMUNITY
Start: 1900-01-01 | End: 1900-01-01

## 2023-08-20 RX ORDER — TIZANIDINE 2 MG/1
2 TABLET ORAL
Qty: 120 | Refills: 3 | Status: DISCONTINUED | COMMUNITY
Start: 2023-05-20 | End: 2023-08-20

## 2023-08-20 RX ORDER — IBUPROFEN 200 MG
600 CAPSULE ORAL
Refills: 5 | Status: ACTIVE | COMMUNITY

## 2023-08-20 NOTE — ADDENDUM
[FreeTextEntry1] : I, Eddie Elizabetha, acted solely as a scribe for Dr. Myron I. Kleiner, MD. on 08/16/2023 .

## 2023-08-20 NOTE — ASSESSMENT
[FreeTextEntry1] : Impression: LORENA FELIX is a 78 year old woman who presents for an initial follow up for further evaluation of joint symptoms and rheumatic diseases including fibromyalgia, osteoarthritis, osteoporosis, and chronic low back pain, accompanied by daughter-in-law Malathi Mera   Patient feels much better. Some pain bilateral knees and low back radiating to the bilateral lower extremities to the feet, with activity secondary to osteoarthritis and chronic low back pain with LS spinal stenosis/LS spine neuroforaminal stenosis.  She has not increased the pregabalin from the starting dose.  On exam patient has bilateral bicipital tendinitis, bilateral subacromial bursitis, and bilateral deltoid tendinitis contributing to her joint pains. On her own discontinued Tizanidine secondary to dizziness and restarted Methocarbamol 500 mg once daily p.r.n. with some relief. Denies recent sleep disturbance and fatigue secondary to fibromyalgia. Exercises daily for 30 minutes by walking. Doing 20 repetitions of her bilateral knee exercises each session each day. The patient continues calcium, vitamin D supplements, and Risedronate for osteoporosis. Recent MRI results of the bilateral knees reveal osteoarthritis bilaterally, fraying of the left meniscus, meniscal tear of the right knee, with extensive discussion. Patient denies rash with current medication.   Plan: I reviewed  chart and previous records  I reviewed recent lab results with patient and her daughter-in-law with extensive discussion I reviewed recent bilateral Knee MRI results with patient and her daughter-in-law with extensive discussion  Laboratory tests ordered - see list below - with coordination of care Diagnosis and prognosis discussed Continue current medications (other than those changed below) Increase Methocarbamol 500 mg t.i.d at end of meals (Possible side effects explained) Increase Lyrica 50 mg b.i.d; if no better/side effects in 7 days increase Lyrica 75 mg twice daily (Possible side effects explained) Continue Daily exercise of at least 30 minutes per day - emphasized --extensive discussion Back and bilateral Knee exercises - emphasized--extensive discussion Orthopedic Consultation Dr. Whaley/Dr. Salazar regarding right meniscal tear and fraying of left meniscus as seen on recent Bilateral Knee MRI Return visit 3 months All questions and concerns were addressed

## 2023-08-20 NOTE — CONSULT LETTER
[Dear  ___] : Dear  [unfilled], [Consult Letter:] : I had the pleasure of evaluating your patient, [unfilled]. [Please see my note below.] : Please see my note below. [Consult Closing:] : Thank you very much for allowing me to participate in the care of this patient.  If you have any questions, please do not hesitate to contact me. [Sincerely,] : Sincerely, [DrBrunilda  ___] : Dr. ARNETT [DrBrunilda ___] : Dr. ARNETT [FreeTextEntry3] : Alexander\par  Myron I. Kleiner, M.D., FACR \par  Chief, Division of Rheumatology\par  Department of Medicine \par  [facility]

## 2023-08-20 NOTE — PHYSICAL EXAM
[General Appearance - Alert] : alert [General Appearance - In No Acute Distress] : in no acute distress [General Appearance - Well Nourished] : well nourished [General Appearance - Well Developed] : well developed [General Appearance - Well-Appearing] : healthy appearing [] : normal voice and communication [Sclera] : the sclera and conjunctiva were normal [PERRL With Normal Accommodation] : pupils were equal in size, round, and reactive to light [Extraocular Movements] : extraocular movements were intact [Outer Ear] : the ears and nose were normal in appearance [Neck Appearance] : the appearance of the neck was normal [Neck Cervical Mass (___cm)] : no neck mass was observed [Jugular Venous Distention Increased] : there was no jugular-venous distention [Thyroid Diffuse Enlargement] : the thyroid was not enlarged [Thyroid Nodule] : there were no palpable thyroid nodules [No CVA Tenderness] : no ~M costovertebral angle tenderness [No Spinal Tenderness] : no spinal tenderness [Cranial Nerves] : cranial nerves 2-12 were intact [Deep Tendon Reflexes (DTR)] : deep tendon reflexes were 2+ and symmetric [Sensation] : the sensory exam was normal to light touch and pinprick [Motor Exam] : the motor exam was normal [No Focal Deficits] : no focal deficits [FreeTextEntry1] : Strength-5/5

## 2023-08-20 NOTE — REASON FOR VISIT
[Follow-Up: _____] : a [unfilled] follow-up visit [Family Member] : family member [[employer] ] : provided by [employer]   [Interpreters_IDNumber] : [id number] [Interpreters_FullName] : Peter

## 2023-08-20 NOTE — HISTORY OF PRESENT ILLNESS
[FreeTextEntry1] : LORENA FELIX is a 78 year old woman who presents for an initial follow up for further evaluation of joint symptoms and rheumatic diseases, accompanied by daughter-in-law Malathi Mera   Patient feels much better. Some pain bilateral knees and low back radiating to the bilateral lower extremities to the feet, with activity. On her own discontinued Tizanidine secondary to dizziness. Denies recent sleep disturbance and fatigue. Exercises daily for 30 minutes by walking. Doing 20 repetitions of her bilateral knee exercises each session each day. Did not continue PT secondary to not wanting to. On her own, restarted Methocarbamol 500 mg once daily p.r.n. with relief. The patient continues calcium, vitamin D supplements, and Risedronate. Patient denies rash with current medication.

## 2023-09-07 ENCOUNTER — APPOINTMENT (OUTPATIENT)
Dept: CARDIOLOGY | Facility: CLINIC | Age: 78
End: 2023-09-07

## 2023-11-13 ENCOUNTER — OFFICE (OUTPATIENT)
Dept: URBAN - METROPOLITAN AREA CLINIC 115 | Facility: CLINIC | Age: 78
Setting detail: OPHTHALMOLOGY
End: 2023-11-13
Payer: COMMERCIAL

## 2023-11-13 ENCOUNTER — RX ONLY (RX ONLY)
Age: 78
End: 2023-11-13

## 2023-11-13 DIAGNOSIS — H04.552: ICD-10-CM

## 2023-11-13 DIAGNOSIS — H02.031: ICD-10-CM

## 2023-11-13 DIAGNOSIS — H02.034: ICD-10-CM

## 2023-11-13 DIAGNOSIS — H35.033: ICD-10-CM

## 2023-11-13 DIAGNOSIS — H35.3132: ICD-10-CM

## 2023-11-13 DIAGNOSIS — H40.1111: ICD-10-CM

## 2023-11-13 DIAGNOSIS — Z96.1: ICD-10-CM

## 2023-11-13 DIAGNOSIS — H02.132: ICD-10-CM

## 2023-11-13 DIAGNOSIS — H04.553: ICD-10-CM

## 2023-11-13 DIAGNOSIS — H04.551: ICD-10-CM

## 2023-11-13 DIAGNOSIS — H16.223: ICD-10-CM

## 2023-11-13 PROCEDURE — 92133 CPTRZD OPH DX IMG PST SGM ON: CPT | Performed by: OPHTHALMOLOGY

## 2023-11-13 PROCEDURE — 99213 OFFICE O/P EST LOW 20 MIN: CPT | Performed by: OPHTHALMOLOGY

## 2023-11-13 ASSESSMENT — REFRACTION_AUTOREFRACTION
OS_SPHERE: +0.50
OD_AXIS: 069
OD_CYLINDER: -1.25
OD_SPHERE: +1.00
OS_AXIS: 087
OS_CYLINDER: -1.00

## 2023-11-13 ASSESSMENT — REFRACTION_CURRENTRX
OD_VPRISM_DIRECTION: PROGS
OS_ADD: +3.00
OS_SPHERE: +3.75
OS_CYLINDER: -2.00
OD_OVR_VA: 20/
OD_VPRISM_DIRECTION: PROGS
OD_ADD: +2.00
OD_AXIS: 077
OS_AXIS: 090
OS_ADD: +1.50
OS_AXIS: 066
OS_VPRISM_DIRECTION: PROGS
OD_CYLINDER: -0.50
OS_CYLINDER: -0.75
OD_AXIS: 082
OD_OVR_VA: 20/
OS_OVR_VA: 20/
OD_ADD: +2.25
OS_VPRISM_DIRECTION: PROGS
OD_VPRISM_DIRECTION: PROGS
OD_AXIS: 075
OD_CYLINDER: -2.50
OS_VPRISM_DIRECTION: PROGS
OS_ADD: +2.25
OS_SPHERE: +0.25
OD_ADD: +1.50
OD_CYLINDER: -3.00
OS_AXIS: 072
OD_OVR_VA: 20/
OD_SPHERE: +3.00
OD_CYLINDER: -0.50
OS_OVR_VA: 20/
OS_ADD: +2.00
OD_SPHERE: +0.75
OS_AXIS: 067
OS_CYLINDER: -2.00
OD_SPHERE: +0.50
OD_ADD: +2.75
OS_SPHERE: +3.75
OS_OVR_VA: 20/
OS_CYLINDER: -1.00
OD_AXIS: 066
OS_SPHERE: +0.25
OD_SPHERE: +3.50

## 2023-11-13 ASSESSMENT — SPHEQUIV_DERIVED
OS_SPHEQUIV: -0.125
OD_SPHEQUIV: 0.125
OS_SPHEQUIV: 0
OD_SPHEQUIV: 0.375

## 2023-11-13 ASSESSMENT — REFRACTION_MANIFEST
OD_SPHERE: +0.50
OS_CYLINDER: -0.75
OD_VA1: 20/25
OD_ADD: +2.75
OS_AXIS: 90
OS_ADD: +2.75
OS_SPHERE: +0.25
OD_AXIS: 65
OS_VA1: 20/25
OD_CYLINDER: -0.75

## 2023-11-13 ASSESSMENT — LID EXAM ASSESSMENTS: OD_TRICHIASIS: RUL 2+

## 2023-11-13 ASSESSMENT — LID POSITION - ENTROPION
OD_ENTROPION: RUL 2+
OS_ENTROPION: LUL 2+

## 2023-11-13 ASSESSMENT — LID POSITION - ECTROPION: OD_ECTROPION: RLL 2+

## 2023-12-11 ENCOUNTER — APPOINTMENT (OUTPATIENT)
Dept: CARDIOLOGY | Facility: CLINIC | Age: 78
End: 2023-12-11
Payer: MEDICARE

## 2023-12-11 PROCEDURE — 93306 TTE W/DOPPLER COMPLETE: CPT

## 2023-12-27 ENCOUNTER — APPOINTMENT (OUTPATIENT)
Dept: CARDIOLOGY | Facility: CLINIC | Age: 78
End: 2023-12-27
Payer: MEDICARE

## 2023-12-27 VITALS
BODY MASS INDEX: 30.18 KG/M2 | DIASTOLIC BLOOD PRESSURE: 70 MMHG | WEIGHT: 164 LBS | HEART RATE: 62 BPM | OXYGEN SATURATION: 98 % | RESPIRATION RATE: 16 BRPM | HEIGHT: 62 IN | SYSTOLIC BLOOD PRESSURE: 122 MMHG

## 2023-12-27 DIAGNOSIS — R06.09 OTHER FORMS OF DYSPNEA: ICD-10-CM

## 2023-12-27 DIAGNOSIS — I07.1 RHEUMATIC TRICUSPID INSUFFICIENCY: ICD-10-CM

## 2023-12-27 PROCEDURE — 99214 OFFICE O/P EST MOD 30 MIN: CPT | Mod: 25

## 2023-12-27 PROCEDURE — 93000 ELECTROCARDIOGRAM COMPLETE: CPT

## 2023-12-27 NOTE — ASSESSMENT
[FreeTextEntry1] : ECG-Atrial fibrillation with a ventricular rate of 62 bpm. Poor R wave progression. Consider anteroseptal infarct.  Lab data ------9/19/20---11/23/21--10/7/22 Cho---173--------151--------153 HDL----67---------71---------60 LDL----90---------70---------80 Trig----73---------52---------57   Home sleep study 5/14/2021: Respiratory event index 8.7 consistent with mild obstructive sleep apnea. Lowest saturation 75% AutoPap titration was recommended  Attended sleep study 7/12/2021: AHI equals 65.6 Richard O2 63% Impression severe obstructive sleep apnea  Echocardiogram 12/11/2023 Normal LV size and function LVEF 60 to 65% Severe left atrial and mild right atrial dilatation Mild mitral regurgitation moderate to severe tricuspid digitation Pulmonary pressures 64 consistent with severe pulm hypertension. Unchanged compared to prior study 1 year ago  Echocardiogram 12/12/2022: Normal for size and function LVEF 55 to 60% RVH with normal function Moderate left atrial enlargement Mild MR and severe TR Severe pulmonary hypertension 62 mmHg  Echocardiogram 2/17/2021: LVEF 55 to 60% Severe tricuspid regurgitation Pulmonary systolic pressure is 58. Biatrial enlargement. Mild RVH.  9/26/19 echo NL LV 70-75% Borderline LVH Mod. TR Mild- Mod. MR Mildly elevated PAP 37.7 mmHg Mild aortic valve sclerosis.  Carotid disease Right- Mild plaque at the distal RCCA, minimal soft plaque in the bulb and prox. MERY Left- Mild plaque at the distal LCCA, bulb, and prox. MERY  Impression 78-year-old female with severe pulm hypertension, severe obstructive sleep apnea, persistent atrial fibrillation, presenting for evaluation.  1. AFIB initially paroxysmal transitioning back and forth in sinus and now seemingly in persistent A. fib. An earlier cardioversion temporarily converted her to SR initially. Holter monitor demonstrated persistent atrial fibrillation with an average heart rate of 82 and a range of . - Anticoagulated on Eliquis.  2. Last Melissa sestamibi stress was negative for ischemia. Does have mild WIGGINS with chest pressure but probably related to deconditioning and moderately severe pulmonary hypertension.  3. No family hx that she is aware of other then a CVA in her grandmother.  4. Evidence of severe obstructive sleep apnea on the attended study as opposed to mild on the home sleep study.  On CPAP x 6 month with pulmonary follow-up shortly.  5. BP today controlled.  6. b/l carotid disease, mild.  7. Echocardiography demonstrated preserved EF but severe TR and consistently severe pulmonary hypertension and biatrial enlargement. No evidence of RHF. There is also suggestion of mild RVH. All relatively unchanged.  8. Hyperlipidemia: Appears to be adequately controlled.  Plan 1. Follow-up with sleep medicine.  Unfortunately, no improvement in the PA pressures despite the use of CPAP.  2. For smoother rate control of AFIB, continue metoprolol ER 50 QD.  3. Both Mrs. Mosquera and her daughter understand the importance of maintaining some type of formal exercise pattern and strict diet modifications. Especially as regards carbohydrates.  4. If WIGGINS returns, consider stress testing.  5. Instructed patient to limit caffeine and alcohol intake.  6. Instructed patient about the absolute need for follow-up with sleep medicine regarding treatment of what is now severe obstructive sleep apnea. She was a no-show for her last telehealth visit with pulmonary.  7. Reviewed in detail with the patient the relationship between her weight, her sleep apnea, her pulmonary hypertension, her atrial fibrillation.  8. Repeat echocardiogram in 1 year  to reassess pulmonary hypertension,

## 2023-12-27 NOTE — REASON FOR VISIT
[FreeTextEntry1] : 78   year old Greenlandic speaking patient native of [facility]  presenting for cardiac re evaluation.  Problems include: 1.  Severe pulmonary hypertension 2.  Severe obstructive sleep apnea AHI greater than 60; noncompliant with regular CPAP 3.  Persistent atrial fibrillation (  s/p previous  DCC ( 6/19/20).  Holter monitor to assess her rate/rhythm revealed max rate of 111 bpm and she was converted to Toprol-XL 50 mg daily.  Mentions some balance issues which are  chronic.  7/12/21 HST     AHI-  65.6 compatible with severe obstructive sleep apnea with sai O2 63% Being managed by Dr Brunilda Adair and is compliant with CPAP. Problem that the disc cannot be read to assess efficacy and compliance  Previous cardiac work up through [facility]:  Underwent external cardioversion on 6/19/20 which converted her temporarily to SR.   Apparently there might be some non-compliance issues in the past  with her meds ( rhymol and Eliquis) Intolerance of Flecanide due to dizzy spells   Pharmacologic stress test from 9/18/19 demonstrated normal rest-stress myocardial perfusion, ECG negative for ischemia and EF 74%  9/26/19 echo  NL LV  70-75% Borderline LVH Mod. TR Mild- Mod. MR Mildly elevated PAP 37.7 mmHg Mild aortic valve sclerosis.  Carotid Doppler 9/26/19 Right- Mild of the distal RCCA, minimal soft plaque in the bulb and prox. MERY Left-  Mild plaque at the distal LCCA, buld and prox. MERY  Nuclear stress test 9/18/2019: Rest stress myocardial perfusion. No evidence of ischemia Normal gated left ventricular systolic function.

## 2023-12-27 NOTE — PHYSICAL EXAM
[General Appearance - Well Developed] : well developed [Normal Appearance] : normal appearance [Normal Conjunctiva] : the conjunctiva exhibited no abnormalities [Eyelids - No Xanthelasma] : the eyelids demonstrated no xanthelasmas [Normal Oral Mucosa] : normal oral mucosa [No Oral Pallor] : no oral pallor [No Oral Cyanosis] : no oral cyanosis [Normal Oropharynx] : normal oropharynx [Normal Jugular Venous A Waves Present] : normal jugular venous A waves present [Normal Jugular Venous V Waves Present] : normal jugular venous V waves present [No Jugular Venous Burt A Waves] : no jugular venous burt A waves [Respiration, Rhythm And Depth] : normal respiratory rhythm and effort [Auscultation Breath Sounds / Voice Sounds] : lungs were clear to auscultation bilaterally [Exaggerated Use Of Accessory Muscles For Inspiration] : no accessory muscle use [Chest Palpation] : palpation of the chest revealed no abnormalities [Lungs Percussion] : the lungs were normal to percussion [Murmurs] : no murmurs present [Arterial Pulses Normal] : the arterial pulses were normal [Edema] : no peripheral edema present [Veins - Varicosity Changes] : no varicosital changes were noted in the lower extremities [Bowel Sounds] : normal bowel sounds [Abdomen Soft] : soft [Abdomen Tenderness] : non-tender [] : no hepato-splenomegaly [Abdomen Mass (___ Cm)] : no abdominal mass palpated [Abdomen Hernia] : no hernia was discovered [Abnormal Walk] : normal gait [Skin Color & Pigmentation] : normal skin color and pigmentation [Skin Turgor] : normal skin turgor [FreeTextEntry1] : Multiple subcutaneous lipomas.

## 2024-01-03 ENCOUNTER — APPOINTMENT (OUTPATIENT)
Dept: RHEUMATOLOGY | Facility: CLINIC | Age: 79
End: 2024-01-03

## 2024-03-11 ENCOUNTER — OFFICE (OUTPATIENT)
Dept: URBAN - METROPOLITAN AREA CLINIC 115 | Facility: CLINIC | Age: 79
Setting detail: OPHTHALMOLOGY
End: 2024-03-11
Payer: MEDICAID

## 2024-03-11 DIAGNOSIS — H04.551: ICD-10-CM

## 2024-03-11 DIAGNOSIS — H04.553: ICD-10-CM

## 2024-03-11 DIAGNOSIS — H40.1111: ICD-10-CM

## 2024-03-11 DIAGNOSIS — H34.231: ICD-10-CM

## 2024-03-11 DIAGNOSIS — H02.031: ICD-10-CM

## 2024-03-11 DIAGNOSIS — H02.034: ICD-10-CM

## 2024-03-11 DIAGNOSIS — H35.3132: ICD-10-CM

## 2024-03-11 DIAGNOSIS — H02.132: ICD-10-CM

## 2024-03-11 DIAGNOSIS — H04.552: ICD-10-CM

## 2024-03-11 DIAGNOSIS — H35.033: ICD-10-CM

## 2024-03-11 DIAGNOSIS — H16.223: ICD-10-CM

## 2024-03-11 PROCEDURE — 92134 CPTRZ OPH DX IMG PST SGM RTA: CPT | Performed by: OPHTHALMOLOGY

## 2024-03-11 PROCEDURE — 92083 EXTENDED VISUAL FIELD XM: CPT | Performed by: OPHTHALMOLOGY

## 2024-03-11 PROCEDURE — 99213 OFFICE O/P EST LOW 20 MIN: CPT | Performed by: OPHTHALMOLOGY

## 2024-03-11 ASSESSMENT — LID POSITION - ENTROPION
OD_ENTROPION: RUL 2+
OS_ENTROPION: LUL 2+

## 2024-03-11 ASSESSMENT — REFRACTION_CURRENTRX
OS_CYLINDER: -1.00
OD_ADD: +2.25
OS_VPRISM_DIRECTION: PROGS
OD_CYLINDER: -3.00
OS_OVR_VA: 20/
OS_OVR_VA: 20/
OD_AXIS: 082
OD_SPHERE: +3.00
OS_CYLINDER: -0.75
OS_AXIS: 067
OD_AXIS: 077
OS_AXIS: 090
OD_VPRISM_DIRECTION: PROGS
OD_SPHERE: +0.50
OS_ADD: +1.50
OD_OVR_VA: 20/
OD_OVR_VA: 20/
OD_AXIS: 075
OD_OVR_VA: 20/
OD_CYLINDER: -0.50
OD_VPRISM_DIRECTION: PROGS
OD_ADD: +2.00
OS_AXIS: 072
OD_CYLINDER: -0.50
OS_ADD: +3.00
OS_SPHERE: +0.25
OS_AXIS: 066
OS_SPHERE: +3.75
OS_SPHERE: +0.25
OS_ADD: +2.00
OS_ADD: +2.25
OS_SPHERE: +3.75
OD_AXIS: 066
OS_CYLINDER: -2.00
OS_VPRISM_DIRECTION: PROGS
OD_VPRISM_DIRECTION: PROGS
OD_CYLINDER: -2.50
OS_OVR_VA: 20/
OD_SPHERE: +3.50
OS_CYLINDER: -2.00
OD_ADD: +2.75
OS_VPRISM_DIRECTION: PROGS
OD_SPHERE: +0.75
OD_ADD: +1.50

## 2024-03-11 ASSESSMENT — REFRACTION_MANIFEST
OD_SPHERE: +0.50
OS_CYLINDER: -0.75
OS_VA1: 20/25
OD_AXIS: 65
OS_ADD: +2.75
OD_CYLINDER: -0.75
OD_ADD: +2.75
OS_SPHERE: +0.25
OS_AXIS: 90
OD_VA1: 20/25

## 2024-03-11 ASSESSMENT — SPHEQUIV_DERIVED
OS_SPHEQUIV: -0.125
OD_SPHEQUIV: 0.125

## 2024-03-11 ASSESSMENT — LID EXAM ASSESSMENTS: OD_TRICHIASIS: RUL 2+

## 2024-03-11 ASSESSMENT — LID POSITION - ECTROPION: OD_ECTROPION: RLL 2+

## 2024-04-03 ENCOUNTER — APPOINTMENT (OUTPATIENT)
Dept: OTOLARYNGOLOGY | Facility: CLINIC | Age: 79
End: 2024-04-03

## 2024-05-06 ENCOUNTER — APPOINTMENT (OUTPATIENT)
Dept: OTOLARYNGOLOGY | Facility: CLINIC | Age: 79
End: 2024-05-06
Payer: MEDICARE

## 2024-05-06 VITALS — WEIGHT: 165 LBS | BODY MASS INDEX: 32.39 KG/M2 | HEIGHT: 60 IN

## 2024-05-06 DIAGNOSIS — H90.3 SENSORINEURAL HEARING LOSS, BILATERAL: ICD-10-CM

## 2024-05-06 DIAGNOSIS — H69.93 UNSPECIFIED EUSTACHIAN TUBE DISORDER, BILATERAL: ICD-10-CM

## 2024-05-06 DIAGNOSIS — H93.13 TINNITUS, BILATERAL: ICD-10-CM

## 2024-05-06 PROCEDURE — 92557 COMPREHENSIVE HEARING TEST: CPT

## 2024-05-06 PROCEDURE — 92567 TYMPANOMETRY: CPT

## 2024-05-06 PROCEDURE — 99203 OFFICE O/P NEW LOW 30 MIN: CPT | Mod: 25

## 2024-05-06 RX ORDER — FLUTICASONE PROPIONATE 50 UG/1
50 SPRAY, METERED NASAL
Qty: 3 | Refills: 3 | Status: ACTIVE | COMMUNITY
Start: 2024-05-06 | End: 1900-01-01

## 2024-05-06 NOTE — HISTORY OF PRESENT ILLNESS
[de-identified] : hx hearing loss using au aids but still difficulty w hearing co noise in ears like wind co ear pressure

## 2024-05-06 NOTE — ASSESSMENT
[FreeTextEntry1] : exam unremarkable audio au loss moderate excellent discrim c tymp as tinnitus eust tube dys  trial fluticasone spray

## 2024-05-06 NOTE — REASON FOR VISIT
[Initial Consultation] : an initial consultation for [Family Member] : family member [FreeTextEntry2] : hearing evaluation

## 2024-05-06 NOTE — DATA REVIEWED
[de-identified] : Type A tymps, AU. AD- Mild / borderline-normal sloping to moderate / moderately-severe SNHL. AS- Moderate rising to borderline-normal, sloping to moderate / moderately-severe SNHL.

## 2024-05-06 NOTE — REVIEW OF SYSTEMS
[Hearing Loss] : hearing loss [Dizziness] : dizziness [Ear Noises] : ear noises [Patient Intake Form Reviewed] : Patient intake form was reviewed [Negative] : Ear [de-identified] : hears thumping

## 2024-05-08 ENCOUNTER — INPATIENT (INPATIENT)
Facility: HOSPITAL | Age: 79
LOS: 1 days | Discharge: HOME | DRG: 293 | End: 2024-05-10
Attending: STUDENT IN AN ORGANIZED HEALTH CARE EDUCATION/TRAINING PROGRAM | Admitting: STUDENT IN AN ORGANIZED HEALTH CARE EDUCATION/TRAINING PROGRAM
Payer: MEDICARE

## 2024-05-08 ENCOUNTER — NON-APPOINTMENT (OUTPATIENT)
Age: 79
End: 2024-05-08

## 2024-05-08 VITALS
SYSTOLIC BLOOD PRESSURE: 146 MMHG | HEIGHT: 62 IN | OXYGEN SATURATION: 98 % | DIASTOLIC BLOOD PRESSURE: 76 MMHG | RESPIRATION RATE: 18 BRPM | WEIGHT: 164.91 LBS | TEMPERATURE: 98 F | HEART RATE: 80 BPM

## 2024-05-08 LAB
ALBUMIN SERPL ELPH-MCNC: 3.1 G/DL — LOW (ref 3.3–5.2)
ALP SERPL-CCNC: 61 U/L — SIGNIFICANT CHANGE UP (ref 40–120)
ALT FLD-CCNC: 29 U/L — SIGNIFICANT CHANGE UP
ANION GAP SERPL CALC-SCNC: 6 MMOL/L — SIGNIFICANT CHANGE UP (ref 5–17)
AST SERPL-CCNC: 29 U/L — SIGNIFICANT CHANGE UP
BASOPHILS # BLD AUTO: 0.02 K/UL — SIGNIFICANT CHANGE UP (ref 0–0.2)
BASOPHILS NFR BLD AUTO: 0.2 % — SIGNIFICANT CHANGE UP (ref 0–2)
BILIRUB SERPL-MCNC: 1 MG/DL — SIGNIFICANT CHANGE UP (ref 0.4–2)
BUN SERPL-MCNC: 24.8 MG/DL — HIGH (ref 8–20)
CALCIUM SERPL-MCNC: 8.1 MG/DL — LOW (ref 8.4–10.5)
CHLORIDE SERPL-SCNC: 104 MMOL/L — SIGNIFICANT CHANGE UP (ref 96–108)
CO2 SERPL-SCNC: 28 MMOL/L — SIGNIFICANT CHANGE UP (ref 22–29)
CREAT SERPL-MCNC: 0.7 MG/DL — SIGNIFICANT CHANGE UP (ref 0.5–1.3)
EGFR: 87 ML/MIN/1.73M2 — SIGNIFICANT CHANGE UP
EOSINOPHIL # BLD AUTO: 0.03 K/UL — SIGNIFICANT CHANGE UP (ref 0–0.5)
EOSINOPHIL NFR BLD AUTO: 0.3 % — SIGNIFICANT CHANGE UP (ref 0–6)
GLUCOSE SERPL-MCNC: 124 MG/DL — HIGH (ref 70–99)
HCT VFR BLD CALC: 36.1 % — SIGNIFICANT CHANGE UP (ref 34.5–45)
HGB BLD-MCNC: 12.3 G/DL — SIGNIFICANT CHANGE UP (ref 11.5–15.5)
IMM GRANULOCYTES NFR BLD AUTO: 1.1 % — HIGH (ref 0–0.9)
LYMPHOCYTES # BLD AUTO: 0.84 K/UL — LOW (ref 1–3.3)
LYMPHOCYTES # BLD AUTO: 7.4 % — LOW (ref 13–44)
MAGNESIUM SERPL-MCNC: 2.2 MG/DL — SIGNIFICANT CHANGE UP (ref 1.6–2.6)
MCHC RBC-ENTMCNC: 31 PG — SIGNIFICANT CHANGE UP (ref 27–34)
MCHC RBC-ENTMCNC: 34.1 GM/DL — SIGNIFICANT CHANGE UP (ref 32–36)
MCV RBC AUTO: 90.9 FL — SIGNIFICANT CHANGE UP (ref 80–100)
MONOCYTES # BLD AUTO: 0.87 K/UL — SIGNIFICANT CHANGE UP (ref 0–0.9)
MONOCYTES NFR BLD AUTO: 7.6 % — SIGNIFICANT CHANGE UP (ref 2–14)
NEUTROPHILS # BLD AUTO: 9.51 K/UL — HIGH (ref 1.8–7.4)
NEUTROPHILS NFR BLD AUTO: 83.4 % — HIGH (ref 43–77)
NT-PROBNP SERPL-SCNC: 1986 PG/ML — HIGH (ref 0–300)
PLATELET # BLD AUTO: 126 K/UL — LOW (ref 150–400)
POTASSIUM SERPL-MCNC: 3.9 MMOL/L — SIGNIFICANT CHANGE UP (ref 3.5–5.3)
POTASSIUM SERPL-SCNC: 3.9 MMOL/L — SIGNIFICANT CHANGE UP (ref 3.5–5.3)
PROT SERPL-MCNC: 5.5 G/DL — LOW (ref 6.6–8.7)
RBC # BLD: 3.97 M/UL — SIGNIFICANT CHANGE UP (ref 3.8–5.2)
RBC # FLD: 14.1 % — SIGNIFICANT CHANGE UP (ref 10.3–14.5)
SODIUM SERPL-SCNC: 138 MMOL/L — SIGNIFICANT CHANGE UP (ref 135–145)
TROPONIN T, HIGH SENSITIVITY RESULT: 14 NG/L — SIGNIFICANT CHANGE UP (ref 0–51)
WBC # BLD: 11.4 K/UL — HIGH (ref 3.8–10.5)
WBC # FLD AUTO: 11.4 K/UL — HIGH (ref 3.8–10.5)

## 2024-05-08 PROCEDURE — 71045 X-RAY EXAM CHEST 1 VIEW: CPT | Mod: 26

## 2024-05-08 PROCEDURE — 99285 EMERGENCY DEPT VISIT HI MDM: CPT

## 2024-05-08 RX ORDER — ACETAMINOPHEN 500 MG
1000 TABLET ORAL ONCE
Refills: 0 | Status: DISCONTINUED | OUTPATIENT
Start: 2024-05-08 | End: 2024-05-08

## 2024-05-08 RX ORDER — FUROSEMIDE 40 MG
20 TABLET ORAL ONCE
Refills: 0 | Status: COMPLETED | OUTPATIENT
Start: 2024-05-08 | End: 2024-05-08

## 2024-05-08 RX ORDER — ACETAMINOPHEN 500 MG
650 TABLET ORAL ONCE
Refills: 0 | Status: COMPLETED | OUTPATIENT
Start: 2024-05-08 | End: 2024-05-08

## 2024-05-08 RX ADMIN — Medication 20 MILLIGRAM(S): at 23:36

## 2024-05-08 RX ADMIN — Medication 650 MILLIGRAM(S): at 21:35

## 2024-05-08 NOTE — ED PROVIDER NOTE - PROGRESS NOTE DETAILS
Case discussed with hospitalist Dr. Hair.  Patient to be admitted to telemetry.  TTE ordered. Kiki Barcenas MD PGY-3

## 2024-05-08 NOTE — ED ADULT NURSE NOTE - OBJECTIVE STATEMENT
PT A&Ox4. pt states she has bilateral leg swelling from knee to feet, feet get redder when walking, pain started 2 weeks ago, swelling started Friday

## 2024-05-08 NOTE — ED PROVIDER NOTE - CLINICAL SUMMARY MEDICAL DECISION MAKING FREE TEXT BOX
79-year-old female with PMH HTN, prior CVA, on Eliquis for unclear reason sent in by cardiologist for leg swelling.   Patient is overall nontoxic-appearing and without signs of respiratory distress.  Physical exam significant for bilateral pitting edema extending above the knees.  No evidence of cellulitis on exam.  Concern for new onset heart failure.  Will check CBC, CMP, troponin, EKG, BNP, chest x-ray to evaluate for ACS, renal dysfunction, pulmonary edema, pleural effusion.  Cardiology consult placed for Dr. Portillo's group.  Patient likely admission for new heart failure.

## 2024-05-08 NOTE — ED PROVIDER NOTE - ATTENDING CONTRIBUTION TO CARE
79-year-old female with PMH HTN, prior CVA, on Eliquis for unclear reason sent in by cardiologist for leg swelling.  Patient reports that she has had significant bilateral leg swelling starting 5 days ago that extend above her knees.  Patient also endorsing worsening shortness of breath on exertion and intermittent chest pain.    I, Bishop Moreno, personally saw the patient with the resident, and completed the key components of the history and physical exam. I then discussed the management plan with the resident.

## 2024-05-08 NOTE — ED PROVIDER NOTE - OBJECTIVE STATEMENT
79-year-old female with PMH HTN, prior CVA, on Eliquis for unclear reason sent in by cardiologist for leg swelling.  Patient reports that she has had significant bilateral leg swelling starting 5 days ago that extend above her knees.  Patient also endorsing worsening shortness of breath on exertion and intermittent chest pain.  Patient also complaining of bilateral knee pain which has been going on for the last few weeks that has been attributed to osteoarthritis.  Denies URI symptoms, abdominal pain, nausea, vomiting, recent falls.  Patient called her cardiologist Dr. Gage Portillo's office who recommended he come to the emergency department.  Patient denies history of cardiac stents or heart failure.  She is not on a diuretic.

## 2024-05-08 NOTE — ED ADULT TRIAGE NOTE - CHIEF COMPLAINT QUOTE
pt states she has bilateral leg swelling from knee to feet, feet get redder when walking, pain started 2 weeks ago, swelling started Friday  A&Ox3, resp + WIGGINS

## 2024-05-08 NOTE — ED PROVIDER NOTE - PHYSICAL EXAMINATION
Gen: no acute distress  Head: normocephalic, atraumatic  EENT: EOMI  Lung: no increased work of breathing, CTABL  CV: normal s1/s2, 2+ pitting edema extending above knees b/l  Abd: soft, non-tender, non-distended  MSK: no visible deformities, full range of motion in all 4 extremities  Neuro: A&Ox4; No focal neurologic deficits

## 2024-05-09 ENCOUNTER — RESULT CHARGE (OUTPATIENT)
Age: 79
End: 2024-05-09

## 2024-05-09 ENCOUNTER — RESULT REVIEW (OUTPATIENT)
Age: 79
End: 2024-05-09

## 2024-05-09 ENCOUNTER — NON-APPOINTMENT (OUTPATIENT)
Age: 79
End: 2024-05-09

## 2024-05-09 DIAGNOSIS — I50.9 HEART FAILURE, UNSPECIFIED: ICD-10-CM

## 2024-05-09 DIAGNOSIS — Z90.711 ACQUIRED ABSENCE OF UTERUS WITH REMAINING CERVICAL STUMP: Chronic | ICD-10-CM

## 2024-05-09 LAB
MAGNESIUM SERPL-MCNC: 2.1 MG/DL — SIGNIFICANT CHANGE UP (ref 1.6–2.6)
TROPONIN T, HIGH SENSITIVITY RESULT: 13 NG/L — SIGNIFICANT CHANGE UP (ref 0–51)
TROPONIN T, HIGH SENSITIVITY RESULT: 14 NG/L — SIGNIFICANT CHANGE UP (ref 0–51)
TSH SERPL-MCNC: 1.16 UIU/ML — SIGNIFICANT CHANGE UP (ref 0.27–4.2)

## 2024-05-09 PROCEDURE — 93306 TTE W/DOPPLER COMPLETE: CPT | Mod: 26

## 2024-05-09 PROCEDURE — 93010 ELECTROCARDIOGRAM REPORT: CPT

## 2024-05-09 PROCEDURE — 93970 EXTREMITY STUDY: CPT | Mod: 26

## 2024-05-09 PROCEDURE — 99497 ADVNCD CARE PLAN 30 MIN: CPT | Mod: 25

## 2024-05-09 PROCEDURE — 99222 1ST HOSP IP/OBS MODERATE 55: CPT

## 2024-05-09 PROCEDURE — 99223 1ST HOSP IP/OBS HIGH 75: CPT | Mod: GC

## 2024-05-09 RX ORDER — FUROSEMIDE 40 MG
20 TABLET ORAL
Refills: 0 | Status: DISCONTINUED | OUTPATIENT
Start: 2024-05-09 | End: 2024-05-10

## 2024-05-09 RX ORDER — ONDANSETRON 8 MG/1
4 TABLET, FILM COATED ORAL EVERY 8 HOURS
Refills: 0 | Status: DISCONTINUED | OUTPATIENT
Start: 2024-05-09 | End: 2024-05-10

## 2024-05-09 RX ORDER — APIXABAN 2.5 MG/1
5 TABLET, FILM COATED ORAL
Refills: 0 | Status: DISCONTINUED | OUTPATIENT
Start: 2024-05-09 | End: 2024-05-10

## 2024-05-09 RX ORDER — APIXABAN 2.5 MG/1
1 TABLET, FILM COATED ORAL
Refills: 0 | DISCHARGE

## 2024-05-09 RX ORDER — ACETAMINOPHEN 500 MG
650 TABLET ORAL EVERY 6 HOURS
Refills: 0 | Status: DISCONTINUED | OUTPATIENT
Start: 2024-05-09 | End: 2024-05-10

## 2024-05-09 RX ORDER — LANOLIN ALCOHOL/MO/W.PET/CERES
3 CREAM (GRAM) TOPICAL AT BEDTIME
Refills: 0 | Status: DISCONTINUED | OUTPATIENT
Start: 2024-05-09 | End: 2024-05-10

## 2024-05-09 RX ORDER — METOPROLOL TARTRATE 50 MG
1 TABLET ORAL
Refills: 0 | DISCHARGE

## 2024-05-09 RX ORDER — METOPROLOL TARTRATE 50 MG
50 TABLET ORAL DAILY
Refills: 0 | Status: DISCONTINUED | OUTPATIENT
Start: 2024-05-09 | End: 2024-05-10

## 2024-05-09 RX ORDER — FUROSEMIDE 40 MG
20 TABLET ORAL DAILY
Refills: 0 | Status: DISCONTINUED | OUTPATIENT
Start: 2024-05-09 | End: 2024-05-09

## 2024-05-09 RX ADMIN — Medication 650 MILLIGRAM(S): at 12:49

## 2024-05-09 RX ADMIN — APIXABAN 5 MILLIGRAM(S): 2.5 TABLET, FILM COATED ORAL at 05:53

## 2024-05-09 RX ADMIN — Medication 20 MILLIGRAM(S): at 14:42

## 2024-05-09 RX ADMIN — Medication 650 MILLIGRAM(S): at 05:52

## 2024-05-09 RX ADMIN — Medication 650 MILLIGRAM(S): at 06:22

## 2024-05-09 RX ADMIN — Medication 650 MILLIGRAM(S): at 13:19

## 2024-05-09 RX ADMIN — APIXABAN 5 MILLIGRAM(S): 2.5 TABLET, FILM COATED ORAL at 18:14

## 2024-05-09 RX ADMIN — Medication 50 MILLIGRAM(S): at 05:53

## 2024-05-09 RX ADMIN — Medication 20 MILLIGRAM(S): at 05:53

## 2024-05-09 NOTE — H&P ADULT - TIME BILLING
Time spent on patient encounter including emergency department chart review, history taking and physical exam, developing patient plan and tailoring H&P, as well as discussion with patient, family, RN, resident and other providers involved in patient's care.

## 2024-05-09 NOTE — CHART NOTE - NSCHARTNOTEFT_GEN_A_CORE
79 F PMHx Afib on Eliquis CVA, GOYO, HTN, osteoarthritis came from home to [facility] for bilateral leg swelling. Patient admitted for B/l leg swelling.     Leg Swelling possibly 2/2 new CHF  Severe pulmonary HTN  -Lasix 20 mg IV BID--> PO in AM  -Check TTE; most recent done 12/2023 only showing severe pulm HTN  -Trend trops, x 3, neg    -TSH wnl  A1c/Lipid panel in the am  -Monitor electrolytes, replete to maintain K > 4.0, Mg > 2.0  -Strict Is&Os, Daily weights    Met with family at bedside, patient was off unit for testing  All questions answered  Cardiology eval noted, no further ischemic workup planned. C/w diuresis, follow up AM labs  Dispo planning

## 2024-05-09 NOTE — H&P ADULT - ATTENDING COMMENTS
79 F PMHx Afib on Eliquis CVA, GOYO, HTN, osteoarthritis came from home to [facility] for bilateral leg swelling. Patient admitted for B/l leg swelling.     Leg Swelling possibly 2/2 new CHF  Severe pulmonary HTN  -Admit to medicine with telemetry monitoring  -Lasix 20 mg IV BID  -Check TTE; most recent done 12/2023 only showing severe pulm HTN  -Trend trops, x 3, neg x 2 thus far  -Check TSH/A1c/Lipid panel in the am  -Monitor electrolytes, replete to maintain K > 4.0, Mg > 2.0  -Strict Is&Os, Daily weights  -Cardio consult    Agree with remainder of history, physical, assessment and plan as illustrated in resident note above.

## 2024-05-09 NOTE — ED ADULT NURSE REASSESSMENT NOTE - NSFALLHARMRISKINTERV_ED_ALL_ED
Assistance OOB with selected safe patient handling equipment if applicable/Assistance with ambulation/Communicate risk of Fall with Harm to all staff, patient, and family/Monitor gait and stability/Provide visual cue: red socks, yellow wristband, yellow gown, etc/Reinforce activity limits and safety measures with patient and family/Use of alarms - bed, stretcher, chair and/or video monitoring/Bed in lowest position, wheels locked, appropriate side rails in place/Call bell, personal items and telephone in reach/Instruct patient to call for assistance before getting out of bed/chair/stretcher/Non-slip footwear applied when patient is off stretcher/[place] to call system/Physically safe environment - no spills, clutter or unnecessary equipment/Purposeful Proactive Rounding/Room/bathroom lighting operational, light cord in reach

## 2024-05-09 NOTE — PATIENT PROFILE ADULT - FALL HARM RISK - HARM RISK INTERVENTIONS

## 2024-05-09 NOTE — H&P ADULT - NSHPPHYSICALEXAM_GEN_ALL_CORE
VITALS:   T(C): 36.7 (05-08-24 @ 23:35), Max: 36.7 (05-08-24 @ 23:35)  HR: 71 (05-08-24 @ 23:35) (71 - 80)  BP: 160/83 (05-08-24 @ 23:35) (146/76 - 160/83)  RR: 16 (05-08-24 @ 23:35) (16 - 18)  SpO2: 97% (05-08-24 @ 23:35) (97% - 98%)    GENERAL: NAD, lying in bed comfortably, sleeping   HEAD:  Atraumatic, normocephalic  EYES: EOMI, PERRLA, conjunctiva and sclera clear  ENT: Moist mucous membranes  NECK: Supple, no JVD  HEART: Regular rate and rhythm, no murmurs, rubs, or gallops  LUNGS: Unlabored respirations.  Clear to auscultation bilaterally, no crackles, wheezing, or rhonchi  ABDOMEN: Soft, nontender, nondistended, +BS  EXTREMITIES: 2+ peripheral pulses bilaterally. No clubbing, cyanosis. B/l pitting edema 1+ to midshin.   NERVOUS SYSTEM:  A&Ox3, no focal deficits   SKIN: No rashes or lesions VITALS:   T(C): 36.7 (05-08-24 @ 23:35), Max: 36.7 (05-08-24 @ 23:35)  HR: 71 (05-08-24 @ 23:35) (71 - 80)  BP: 160/83 (05-08-24 @ 23:35) (146/76 - 160/83)  RR: 16 (05-08-24 @ 23:35) (16 - 18)  SpO2: 97% (05-08-24 @ 23:35) (97% - 98%)    GENERAL: NAD, lying in bed comfortably, sleeping   HEAD:  Atraumatic, normocephalic  EYES: EOMI, PERRLA, conjunctiva and sclera clear  ENT: Moist mucous membranes  NECK: Supple, no JVD  HEART: Regular rate and rhythm, no murmurs, rubs, or gallops  LUNGS: Unlabored respirations.  Clear to auscultation bilaterally, no crackles, wheezing, or rhonchi  ABDOMEN: Soft, nontender, nondistended, +BS  EXTREMITIES: 2+ peripheral pulses bilaterally. No clubbing, cyanosis. B/l pitting edema 1+ to midshin.   NERVOUS SYSTEM:  A&Ox3, no focal deficits   SKIN: Ecchymosis on RUE

## 2024-05-09 NOTE — ED ADULT NURSE REASSESSMENT NOTE - NS ED NURSE REASSESS COMMENT FT1
plan of care assumed from EAN RN @1948. no S&S of acute distress, pt resting comfortably on stretcher. c/o b/l LE pain, blanco, and swelling x2 weeks after coming home from [facility]. +pitting edema with bruising noted to b/l LE. pt poor historian. pt denies cp/pressure/tightness, palpitations, sob, dizziness/headache/lightheadedness/blurry vision, tingling/numbness, fevers/chills, N/V/D, urinary S&S upon RN assessment. a-fib on cm+. awaiting admission to [facility]. plan of care reviewed with pt and family. pt in understanding of plan of care. pt refused interpretation services- pt preferred to have family interpret interaction.

## 2024-05-09 NOTE — H&P ADULT - VTE RISK ASSESSMENT
Called pt and lvm. Please help pt schedule a physical appointment.    Vicenta SCHRADER MA     VTE Assessment already completed for this visit

## 2024-05-09 NOTE — H&P ADULT - ASSESSMENT
Assessment:   79 F PMHx Afib on eliquis, CVA, GOYO, HTN, osteoarthritis came from home to [facility] for bilateral leg swelling. Patient admitted for B/l leg swelling.     Plan:   B/l leg swelling  - 1+ pitting edema b/l to midshin w/ hx of afib/HTN/GOYO w/ poor compliance and procedure in [facility]   - Possible CHF/  - Chest x-ray, no infiltrates/effusions   - BNP 1986   - Trop normal x2   - Trop/CBC/CMP/Mg/lipid/HgbA1c AM   - Furosemide 20 mg PO qd   - TTE w/w/o con ordered   - US doppler venous b/l LE ordered   - Trend CBC  - I/Os   - Daily weights   - Cardiology consult     Afib/HTN   - ordered home meds: Eliquis 5 mg PO BID, metoprolol succenate 25 mg PO qd     Diet: DASH/TLC  DVT: SCDx, Eliquis   Dispo: Tele  Assessment:   79 F PMHx Afib on eliquis, CVA, GOYO, HTN, osteoarthritis came from home to [facility] for bilateral leg swelling. Patient admitted for B/l leg swelling.     Plan:   B/l leg swelling  - 1+ pitting edema b/l to midshin w/ hx of afib/HTN/GOYO w/ poor compliance and procedure in [facility]   - Possible new CHF   - Chest x-ray, slight infiltrates b/l  - BNP 1986   - Trop normal x2   - Trop/CBC/CMP/Mg/lipid/HgbA1c AM   - Furosemide 20 mg IV qd   - TTE w/w/o con ordered   - US doppler venous b/l LE ordered   - Trend CBC  - I/Os   - Daily weights   - Cardiology consulted    Afib/HTN   - ordered home meds: Eliquis 5 mg PO BID, metoprolol succenate 25 mg PO qd     Diet: DASH/TLC  DVT: SCDx, Eliquis   Dispo: Tele  Assessment:   79 F PMHx Afib on Eliquis CVA, GOYO, HTN, osteoarthritis came from home to [facility] for bilateral leg swelling. Patient admitted for B/l leg swelling.     Plan:   B/l leg swelling  - 1+ pitting edema b/l to midshin w/ hx of afib/HTN/GOYO w/ poor compliance and procedure in [facility]   - Possible new CHF, worsening pHTN   - TTE from 2023, EF 55-60, pHTN    - Chest x-ray, slight infiltrates b/l  - BNP 1986   - Trop normal x2   - Trop/CBC/CMP/Mg/lipid/HgbA1c AM   - Furosemide 20 mg IV qd   - TTE w/w/o con ordered   - US doppler venous b/l LE ordered   - Trend CBC  - I/Os   - Daily weights   - Cardiology consulted    Afib/HTN   - ordered home meds: Eliquis 5 mg PO BID, metoprolol succenate 25 mg PO qd     Diet: DASH/TLC  DVT: SCDx, Eliquis   Dispo: Tele  Assessment:   79 F PMHx Afib on Eliquis CVA, GOYO, HTN, osteoarthritis came from home to [facility] for bilateral leg swelling. Patient admitted for B/l leg swelling.     Plan:   B/l leg swelling  - 1+ pitting edema b/l to midshin w/ hx of afib/HTN/GOYO w/ poor compliance and procedure in [facility]   - Possible new CHF, worsening pHTN   - TTE from 2023, EF 55-60, pHTN    - Chest x-ray, slight infiltrates b/l  - BNP 1986   - Trop normal x2   - Trop/CBC/CMP/Mg/lipid/HgbA1c AM   - Furosemide 20 mg IV BID   - TTE w/w/o con ordered   - US doppler venous b/l LE ordered   - Trend CBC  - I/Os   - Daily weights   - Cardiology consulted    Afib/HTN   - ordered home meds: Eliquis 5 mg PO BID, metoprolol succenate 25 mg PO qd     Diet: DASH/TLC  DVT: SCDx, Eliquis   Dispo: Tele

## 2024-05-09 NOTE — CONSULT NOTE ADULT - SUBJECTIVE AND OBJECTIVE BOX
LORENA FELIX  [id number]      HPI:  79 F PMHx AF on Eliquis CVA, severe PHTN, GOYO non-compliant w/ CPAP, HTN, osteoarthritis p/w bilateral leg swelling which began 2 weeks ago after returning from [facility]. While in [facility] patient got knee injections b/l 1x w/ Naproxen and Mometasone, after which she developed bruising on arms/legs.  Patient reportedly had issues with WIGGINS but currently denies any SOB.  She received Lasix in the ER and edema is much improved and feeling better.  Currently with no real c/o other than bruising.  Denies orthopnea, syncope.      ALLERGIES:  No Known Allergies      PAST MEDICAL & SURGICAL HISTORY:  As noted above      MEDICATIONS (HOME):  Eliquis 5 mg oral tablet: 1 tab(s) orally 2 times a day (09 May 2024 01:40)  metoprolol succinate 50 mg oral capsule, extended release: 1 cap(s) orally once a day (09 May 2024 01:53)      SOCIAL HISTORY:  Patient denies alcohol, tobacco, drug use    FAMILY HISTORY:  Family history of CVA (Mother)      ROS:  Patient denies cough, and other than noted above full ROS is unremarkable      PHYSICAL EXAM:  Vital Signs Last 24 Hrs  T(C): 36.6 (09 May 2024 11:09), Max: 36.8 (09 May 2024 07:37)  T(F): 97.9 (09 May 2024 11:09), Max: 98.2 (09 May 2024 07:37)  HR: 70 (09 May 2024 11:09) (65 - 80)  BP: 120/71 (09 May 2024 11:09) (120/71 - 162/91)  BP(mean): --  RR: 18 (09 May 2024 11:09) (16 - 18)  SpO2: 98% (09 May 2024 11:09) (97% - 98%)  General: Patient comfortable in NAD  HEENT: NCAT, mmm, EOMI  Neck: no JVD, no carotid bruits  CVS: nl s1, split s2, no s3, no murmur or rubs, irreg  Chest: CTA b/l  Abdomen: soft, nt/nd  Extremities: No c/c/e  Neuro: A&O x3  Psych: Normal affect      ECG:  AF with borderline repol abnormality      LABS:                        12.3   11.40 )-----------( 126      ( 08 May 2024 21:36 )             36.1     05-08    138  |  104  |  24.8<H>  ----------------------------<  124<H>  3.9   |  28.0  |  0.70    Ca    8.1<L>      08 May 2024 21:36  Mg     2.1     05-09    TPro  5.5<L>  /  Alb  3.1<L>  /  TBili  1.0  /  DBili  x   /  AST  29  /  ALT  29  /  AlkPhos  61  05-08        Nuclear stress test 9/18/2019:  Rest stress myocardial perfusion.  No evidence of ischemia  Normal gated left ventricular systolic function.      Echocardiogram 12/11/2023  Normal LV size and function LVEF 60 to 65%  Severe left atrial and mild right atrial dilatation  Mild mitral regurgitation moderate to severe tricuspid digitation  Pulmonary pressures 64 consistent with severe pulm hypertension.  Unchanged compared to prior study 1 year ago      RADIOLOGY:  CXR;  No radiographic evidence of active chest disease.    LE Venous:  No evidence of deep venous thrombosis in either lower extremity.    Echo:   1. Left ventricular cavity is normal in size. Left ventricular systolic function is normal with an ejection fraction of 65 % by Poon's method of disks with an ejection fraction visually estimated at 60 to 65 %.   2. Normal right ventricular cavity size and normal systolic function.   3. The left atrium is mildly dilated.   4. The right atrium is mildly dilated.   5. Mild to moderate mitral regurgitation.   6. Moderate tricuspid regurgitation.   7. Estimated pulmonary artery systolic pressure is 38 mmHg, consistent with mild pulmonary hypertension.   8. No pericardial effusion seen.   9. No prior echocardiogram is available for comparison.    ?      Assessment:  79 F PMHx AF on Eliquis CVA, severe PHTN, GOYO non-compliant w/ CPAP, HTN, osteoarthritis p/w bilateral leg swelling which began 2 weeks ago after returning from [facility].  Patient also with ?WIGGINS.  Now s/p Lasix and feeling better.  Edema essentially resolved.  Echo with normal LVEF, mild-mod MR, mod TR and mild PHTN.  Pulm pressures appear lower than prior.  ?CHF vs. edema 2/2 salt loading and NSAIDs.  Currently improved.  Would change to po Lasix tomorrow and likely can dc home.  Will consider ischemic w/u as OP for ?new CHF.    Plan:  1. Lasix IV now and change to 40mg po tomorrow.  2. Continue Metoprolol at OP dose.  3. Eliquis for CVA PPX.  4. No additional CV testing now.  If continues to feel better consider OP stress test.  5. Dc planning ?tomorrow.    D/w RN.  Will follow.  Thanks!

## 2024-05-09 NOTE — H&P ADULT - HISTORY OF PRESENT ILLNESS
79 F PMHx Afib on eliquis, CVA, GOYO non-compliant w/ CPAP, HTN, osteoarthritis came from home to [facility] for bilateral leg swelling, began 2 weeks ago after returning from [facility]. While in [facility] patient got knee injections b/l 1x w/ Naproxen and Mometasone, afterwards patient developed bruising on arms/legs. Also, patient had chest pain a week ago, woke her up from sleep, chest tightness on right side, radiates to left, took garlic pill and it resolved. Endorses weakness, dizziness SOB on exertion. Denies fever, chills, sick contacts, any current chest tightness/pain, abdominal pain, N/V/D/C. While in ED patient received Lasix 20 mg IV x1, chest x-ray no infiltrates, cardiology consulted.  79 F PMHx Afib on eliquis, CVA, GOYO non-compliant w/ CPAP, HTN, osteoarthritis came from home to [facility] for bilateral leg swelling, began 2 weeks ago after returning from [facility]. While in [facility] patient got knee injections b/l 1x w/ Naproxen and Mometasone, afterwards patient developed bruising on arms/legs. Also, patient had chest pain a week ago, woke her up from sleep, chest tightness on right side, radiates to left, took garlic pill and it resolved. Endorses weakness, dizziness SOB on exertion. Denies fever, chills, sick contacts, any current chest tightness/pain, abdominal pain, N/V/D/C. While in ED patient received Lasix 20 mg IV x1, chest x-ray no infiltrates, cardiology consulted. Cannot reach sister to confirm medications, used Dr. First for medhx, call family in AM.  79 F PMHx Afib on eliquis, CVA, pHTN, GOYO non-compliant w/ CPAP, HTN, osteoarthritis came from home to [facility] for bilateral leg swelling, began 2 weeks ago after returning from [facility]. While in [facility] patient got knee injections b/l 1x w/ Naproxen and Mometasone, afterwards patient developed bruising on arms/legs. Also, patient had chest pain a week ago, woke her up from sleep, chest tightness on right side, radiates to left, took garlic pill and it resolved. Endorses weakness, dizziness SOB on exertion. Denies fever, chills, sick contacts, any current chest tightness/pain, abdominal pain, N/V/D/C. While in ED patient received Lasix 20 mg IV x1, chest x-ray no infiltrates, cardiology consulted. Cannot reach sister to confirm medications, used Dr. First for medhx, call family in AM.  79 F PMHx Afib on eliquis, CVA, pHTN, GOYO non-compliant w/ CPAP, HTN, osteoarthritis came from home to [facility] for bilateral leg swelling, began 2 weeks ago after returning from [facility]. While in [facility] patient got knee injections b/l 1x w/ Naproxen and Mometasone, afterwards patient developed bruising on arms/legs. Also, patient had chest pain a week ago, woke her up from sleep, chest tightness on right side, radiates to left, took garlic pill and it resolved. Endorses weakness, dizziness SOB on exertion. Denies fever, chills, sick contacts, any current chest tightness/pain, abdominal pain, N/V/D/C. While in ED patient received Lasix 20 mg IV x1, chest x-ray no infiltrates, cardiology consulted. Cannot reach daughter-in-law to confirm medications, used Dr. First for medhx, call family in AM.

## 2024-05-10 ENCOUNTER — TRANSCRIPTION ENCOUNTER (OUTPATIENT)
Age: 79
End: 2024-05-10

## 2024-05-10 VITALS
TEMPERATURE: 98 F | SYSTOLIC BLOOD PRESSURE: 122 MMHG | RESPIRATION RATE: 16 BRPM | OXYGEN SATURATION: 97 % | HEART RATE: 70 BPM | DIASTOLIC BLOOD PRESSURE: 72 MMHG

## 2024-05-10 LAB
A1C WITH ESTIMATED AVERAGE GLUCOSE RESULT: 6.3 % — HIGH (ref 4–5.6)
ALBUMIN SERPL ELPH-MCNC: 3.1 G/DL — LOW (ref 3.3–5.2)
ALP SERPL-CCNC: 61 U/L — SIGNIFICANT CHANGE UP (ref 40–120)
ALT FLD-CCNC: 28 U/L — SIGNIFICANT CHANGE UP
ANION GAP SERPL CALC-SCNC: 11 MMOL/L — SIGNIFICANT CHANGE UP (ref 5–17)
AST SERPL-CCNC: 26 U/L — SIGNIFICANT CHANGE UP
BASOPHILS # BLD AUTO: 0.02 K/UL — SIGNIFICANT CHANGE UP (ref 0–0.2)
BASOPHILS NFR BLD AUTO: 0.2 % — SIGNIFICANT CHANGE UP (ref 0–2)
BILIRUB SERPL-MCNC: 1.3 MG/DL — SIGNIFICANT CHANGE UP (ref 0.4–2)
BUN SERPL-MCNC: 19.3 MG/DL — SIGNIFICANT CHANGE UP (ref 8–20)
CALCIUM SERPL-MCNC: 8.1 MG/DL — LOW (ref 8.4–10.5)
CHLORIDE SERPL-SCNC: 103 MMOL/L — SIGNIFICANT CHANGE UP (ref 96–108)
CHOLEST SERPL-MCNC: 179 MG/DL — SIGNIFICANT CHANGE UP
CO2 SERPL-SCNC: 27 MMOL/L — SIGNIFICANT CHANGE UP (ref 22–29)
CREAT SERPL-MCNC: 0.61 MG/DL — SIGNIFICANT CHANGE UP (ref 0.5–1.3)
EGFR: 90 ML/MIN/1.73M2 — SIGNIFICANT CHANGE UP
EOSINOPHIL # BLD AUTO: 0.04 K/UL — SIGNIFICANT CHANGE UP (ref 0–0.5)
EOSINOPHIL NFR BLD AUTO: 0.3 % — SIGNIFICANT CHANGE UP (ref 0–6)
ESTIMATED AVERAGE GLUCOSE: 134 MG/DL — HIGH (ref 68–114)
GLUCOSE SERPL-MCNC: 107 MG/DL — HIGH (ref 70–99)
HCT VFR BLD CALC: 39.7 % — SIGNIFICANT CHANGE UP (ref 34.5–45)
HDLC SERPL-MCNC: 108 MG/DL — SIGNIFICANT CHANGE UP
HGB BLD-MCNC: 13.4 G/DL — SIGNIFICANT CHANGE UP (ref 11.5–15.5)
IMM GRANULOCYTES NFR BLD AUTO: 0.7 % — SIGNIFICANT CHANGE UP (ref 0–0.9)
INR BLD: 1.17 RATIO — SIGNIFICANT CHANGE UP (ref 0.85–1.18)
LIPID PNL WITH DIRECT LDL SERPL: 62 MG/DL — SIGNIFICANT CHANGE UP
LYMPHOCYTES # BLD AUTO: 0.74 K/UL — LOW (ref 1–3.3)
LYMPHOCYTES # BLD AUTO: 6.3 % — LOW (ref 13–44)
MAGNESIUM SERPL-MCNC: 2.4 MG/DL — SIGNIFICANT CHANGE UP (ref 1.6–2.6)
MCHC RBC-ENTMCNC: 30.8 PG — SIGNIFICANT CHANGE UP (ref 27–34)
MCHC RBC-ENTMCNC: 33.8 GM/DL — SIGNIFICANT CHANGE UP (ref 32–36)
MCV RBC AUTO: 91.3 FL — SIGNIFICANT CHANGE UP (ref 80–100)
MONOCYTES # BLD AUTO: 0.7 K/UL — SIGNIFICANT CHANGE UP (ref 0–0.9)
MONOCYTES NFR BLD AUTO: 6 % — SIGNIFICANT CHANGE UP (ref 2–14)
NEUTROPHILS # BLD AUTO: 10.14 K/UL — HIGH (ref 1.8–7.4)
NEUTROPHILS NFR BLD AUTO: 86.5 % — HIGH (ref 43–77)
NON HDL CHOLESTEROL: 71 MG/DL — SIGNIFICANT CHANGE UP
PLATELET # BLD AUTO: 127 K/UL — LOW (ref 150–400)
POTASSIUM SERPL-MCNC: 4 MMOL/L — SIGNIFICANT CHANGE UP (ref 3.5–5.3)
POTASSIUM SERPL-SCNC: 4 MMOL/L — SIGNIFICANT CHANGE UP (ref 3.5–5.3)
PROT SERPL-MCNC: 5.8 G/DL — LOW (ref 6.6–8.7)
PROTHROM AB SERPL-ACNC: 12.9 SEC — SIGNIFICANT CHANGE UP (ref 9.5–13)
RBC # BLD: 4.35 M/UL — SIGNIFICANT CHANGE UP (ref 3.8–5.2)
RBC # FLD: 13.9 % — SIGNIFICANT CHANGE UP (ref 10.3–14.5)
SODIUM SERPL-SCNC: 141 MMOL/L — SIGNIFICANT CHANGE UP (ref 135–145)
TRIGL SERPL-MCNC: 46 MG/DL — SIGNIFICANT CHANGE UP
WBC # BLD: 11.72 K/UL — HIGH (ref 3.8–10.5)
WBC # FLD AUTO: 11.72 K/UL — HIGH (ref 3.8–10.5)

## 2024-05-10 PROCEDURE — 99232 SBSQ HOSP IP/OBS MODERATE 35: CPT

## 2024-05-10 PROCEDURE — 83735 ASSAY OF MAGNESIUM: CPT

## 2024-05-10 PROCEDURE — 93306 TTE W/DOPPLER COMPLETE: CPT

## 2024-05-10 PROCEDURE — 71045 X-RAY EXAM CHEST 1 VIEW: CPT

## 2024-05-10 PROCEDURE — 99285 EMERGENCY DEPT VISIT HI MDM: CPT

## 2024-05-10 PROCEDURE — 93970 EXTREMITY STUDY: CPT

## 2024-05-10 PROCEDURE — 85610 PROTHROMBIN TIME: CPT

## 2024-05-10 PROCEDURE — 36415 COLL VENOUS BLD VENIPUNCTURE: CPT

## 2024-05-10 PROCEDURE — 84484 ASSAY OF TROPONIN QUANT: CPT

## 2024-05-10 PROCEDURE — 96374 THER/PROPH/DIAG INJ IV PUSH: CPT

## 2024-05-10 PROCEDURE — 99239 HOSP IP/OBS DSCHRG MGMT >30: CPT

## 2024-05-10 PROCEDURE — 83880 ASSAY OF NATRIURETIC PEPTIDE: CPT

## 2024-05-10 PROCEDURE — 85025 COMPLETE CBC W/AUTO DIFF WBC: CPT

## 2024-05-10 PROCEDURE — 84443 ASSAY THYROID STIM HORMONE: CPT

## 2024-05-10 PROCEDURE — 80061 LIPID PANEL: CPT

## 2024-05-10 PROCEDURE — 93005 ELECTROCARDIOGRAM TRACING: CPT

## 2024-05-10 PROCEDURE — 83036 HEMOGLOBIN GLYCOSYLATED A1C: CPT

## 2024-05-10 PROCEDURE — 80053 COMPREHEN METABOLIC PANEL: CPT

## 2024-05-10 RX ORDER — FUROSEMIDE 40 MG
1 TABLET ORAL
Qty: 14 | Refills: 0
Start: 2024-05-10 | End: 2024-05-23

## 2024-05-10 RX ORDER — FUROSEMIDE 40 MG
40 TABLET ORAL DAILY
Refills: 0 | Status: DISCONTINUED | OUTPATIENT
Start: 2024-05-10 | End: 2024-05-10

## 2024-05-10 RX ADMIN — Medication 650 MILLIGRAM(S): at 08:55

## 2024-05-10 RX ADMIN — Medication 20 MILLIGRAM(S): at 06:09

## 2024-05-10 RX ADMIN — Medication 50 MILLIGRAM(S): at 06:10

## 2024-05-10 RX ADMIN — Medication 650 MILLIGRAM(S): at 07:55

## 2024-05-10 RX ADMIN — APIXABAN 5 MILLIGRAM(S): 2.5 TABLET, FILM COATED ORAL at 06:09

## 2024-05-10 NOTE — DISCHARGE NOTE PROVIDER - NSDCFUSCHEDAPPT_GEN_ALL_CORE_FT
Gage Portillo  [place] [facility]  CARDIOLOGY [address]  Scheduled Appointment: 05/20/2024    Gage Portillo  [place]  CARDIOLOGY [address]  Scheduled Appointment: 07/03/2024

## 2024-05-10 NOTE — DISCHARGE NOTE PROVIDER - NSDCCPCAREPLAN_GEN_ALL_CORE_FT
PRINCIPAL DISCHARGE DIAGNOSIS  Diagnosis: Acute CHF  Assessment and Plan of Treatment: Please take lasix as prescribed, follow up with your Cardiologist Dr Portillo for consideration of stress test

## 2024-05-10 NOTE — DISCHARGE NOTE NURSING/CASE MANAGEMENT/SOCIAL WORK - PATIENT PORTAL LINK FT
You can access the FollowMyHealth Patient Portal offered by [facility] by registering at the following website: [email or web address]. By joining [employer]’s FollowMyHealth portal, you will also be able to view your health information using other applications (apps) compatible with our system.

## 2024-05-10 NOTE — DISCHARGE NOTE PROVIDER - ATTENDING DISCHARGE PHYSICAL EXAMINATION:
Physical Exam:  General: Well developed, well nourished, NAD  HEENT: NCAT, PERRLA, EOMI bl, moist mucous membranes   Neck: Supple, nontender, no mass  Neurology: A&Ox3, nonfocal, CN II-XII grossly intact, sensation intact, no gait abnormalities   Respiratory: CTA B/L, No W/R/R  CV: RRR, +S1/S2, no murmurs, rubs or gallops  Abdominal: Soft, NT, ND +BSx4  Extremities: No C/C/E, + peripheral pulses  MSK: Normal ROM, no joint erythema or warmth, no joint swelling   Skin: warm, dry, normal color, no rash or abnormal lesions

## 2024-05-10 NOTE — DISCHARGE NOTE PROVIDER - NSDCMRMEDTOKEN_GEN_ALL_CORE_FT
Eliquis 5 mg oral tablet: 1 tab(s) orally 2 times a day  furosemide 40 mg oral tablet: 1 tab(s) orally once a day  metoprolol succinate 50 mg oral capsule, extended release: 1 cap(s) orally once a day

## 2024-05-10 NOTE — DISCHARGE NOTE PROVIDER - CARE PROVIDER_API CALL
Gage Portillo)  Cardiology  [address] [place] [address]  Phone: [phone]  Fax: [phone]  Follow Up Time:

## 2024-05-10 NOTE — PROGRESS NOTE ADULT - SUBJECTIVE AND OBJECTIVE BOX
LORENA FELIX  [id number]      Chief Complaint:  LE edema    Interval History:  Patient feels very well.  No further edema.  Denies CP, SOB, palps.    Tele:  No events      acetaminophen     Tablet .. 650 milliGRAM(s) Oral every 6 hours PRN  aluminum hydroxide/magnesium hydroxide/simethicone Suspension 30 milliLiter(s) Oral every 4 hours PRN  apixaban 5 milliGRAM(s) Oral two times a day  furosemide    Tablet 40 milliGRAM(s) Oral daily  melatonin 3 milliGRAM(s) Oral at bedtime PRN  metoprolol succinate ER 50 milliGRAM(s) Oral daily  ondansetron Injectable 4 milliGRAM(s) IV Push every 8 hours PRN          Physical Exam:  T(C): 37 (05-10-24 @ 08:24), Max: 37.1 (05-09-24 @ 20:15)  HR: 76 (05-10-24 @ 09:00) (58 - 76)  BP: 164/87 (05-10-24 @ 08:24) (136/72 - 164/87)  RR: 18 (05-10-24 @ 08:24) (18 - 18)  SpO2: 98% (05-10-24 @ 08:24) (97% - 99%)  General: Comfortable in NAD  Neck: No JVD  CVS: nl s1s2, no s3  Pulm: CTA b/l  Abd: soft, non-tender  Ext: No c/c/e  Neuro A&O x3  Psych: Normal affect      Labs:   10 May 2024 05:41    141    |  103    |  19.3   ----------------------------<  107    4.0     |  27.0   |  0.61     Ca    8.1        10 May 2024 05:41  Mg     2.4       10 May 2024 05:41    TPro  5.8    /  Alb  3.1    /  TBili  1.3    /  DBili  x      /  AST  26     /  ALT  28     /  AlkPhos  61     10 May 2024 05:41                          13.4   11.72 )-----------( 127      ( 10 May 2024 05:41 )             39.7     PT/INR - ( 10 May 2024 05:41 )   PT: 12.9 sec;   INR: 1.17 ratio             Nuclear stress test 9/18/2019:  Rest stress myocardial perfusion.  No evidence of ischemia  Normal gated left ventricular systolic function.      Echocardiogram 12/11/2023  Normal LV size and function LVEF 60 to 65%  Severe left atrial and mild right atrial dilatation  Mild mitral regurgitation moderate to severe tricuspid digitation  Pulmonary pressures 64 consistent with severe pulm hypertension.  Unchanged compared to prior study 1 year ago      RADIOLOGY:  CXR;  No radiographic evidence of active chest disease.    LE Venous:  No evidence of deep venous thrombosis in either lower extremity.    Echo:   1. Left ventricular cavity is normal in size. Left ventricular systolic function is normal with an ejection fraction of 65 % by Poon's method of disks with an ejection fraction visually estimated at 60 to 65 %.   2. Normal right ventricular cavity size and normal systolic function.   3. The left atrium is mildly dilated.   4. The right atrium is mildly dilated.   5. Mild to moderate mitral regurgitation.   6. Moderate tricuspid regurgitation.   7. Estimated pulmonary artery systolic pressure is 38 mmHg, consistent with mild pulmonary hypertension.   8. No pericardial effusion seen.   9. No prior echocardiogram is available for comparison.    ?      Assessment:  79 F PMHx AF on Eliquis CVA, severe PHTN, GOYO non-compliant w/ CPAP, HTN, osteoarthritis p/w bilateral leg swelling which began 2 weeks ago after returning from [facility].  Patient also with ?WIGGINS.  Now s/p Lasix and feeling better.  Edema essentially resolved.  Echo with normal LVEF, mild-mod MR, mod TR and mild PHTN.  Pulm pressures appear lower than prior.  ?CHF vs. edema 2/2 salt loading and NSAIDs.  Currently improved.  Would change to po Lasix tomorrow and likely can dc home.  Will consider ischemic w/u as OP for ?new CHF.  -Edema resolved and no c/o now.  CV stable for d/c on po Lasix.  ?CHF.  Will consider OP stress test.    Plan:  1. Lasix 40mg po daily.  2. Continue Metoprolol at current dose.  3. Eliquis for CVA PPX.  4. No additional CV testing now.  Consider OP stress test.  5. CV stable for d/c with OP f/u with Dr. Portillo.    Will sign off.  Please call with questions.  Thanks!

## 2024-05-10 NOTE — DISCHARGE NOTE NURSING/CASE MANAGEMENT/SOCIAL WORK - NSDCPEFALRISK_GEN_ALL_CORE
For information on Fall & Injury Prevention, visit: [email or web address] OR  [email or web address] OR  https://www.cdc.gov/steadi/patient.html

## 2024-05-20 ENCOUNTER — APPOINTMENT (OUTPATIENT)
Dept: CARDIOLOGY | Facility: CLINIC | Age: 79
End: 2024-05-20
Payer: MEDICARE

## 2024-05-20 VITALS
HEIGHT: 60 IN | HEART RATE: 71 BPM | OXYGEN SATURATION: 98 % | DIASTOLIC BLOOD PRESSURE: 70 MMHG | RESPIRATION RATE: 16 BRPM | BODY MASS INDEX: 31.41 KG/M2 | WEIGHT: 160 LBS | SYSTOLIC BLOOD PRESSURE: 122 MMHG

## 2024-05-20 DIAGNOSIS — R60.0 LOCALIZED EDEMA: ICD-10-CM

## 2024-05-20 DIAGNOSIS — I77.9 DISORDER OF ARTERIES AND ARTERIOLES, UNSPECIFIED: ICD-10-CM

## 2024-05-20 DIAGNOSIS — R07.89 OTHER CHEST PAIN: ICD-10-CM

## 2024-05-20 DIAGNOSIS — I48.20 CHRONIC ATRIAL FIBRILLATION, UNSP: ICD-10-CM

## 2024-05-20 PROBLEM — I63.9 CEREBRAL INFARCTION, UNSPECIFIED: Chronic | Status: ACTIVE | Noted: 2024-05-08

## 2024-05-20 PROBLEM — I10 ESSENTIAL (PRIMARY) HYPERTENSION: Chronic | Status: ACTIVE | Noted: 2024-05-08

## 2024-05-20 PROCEDURE — 99214 OFFICE O/P EST MOD 30 MIN: CPT

## 2024-05-20 PROCEDURE — 93000 ELECTROCARDIOGRAM COMPLETE: CPT

## 2024-05-20 PROCEDURE — G2211 COMPLEX E/M VISIT ADD ON: CPT

## 2024-05-20 RX ORDER — FUROSEMIDE 40 MG/1
40 TABLET ORAL
Qty: 90 | Refills: 2 | Status: ACTIVE | COMMUNITY
Start: 1900-01-01 | End: 1900-01-01

## 2024-05-20 RX ORDER — POTASSIUM CHLORIDE 1500 MG/1
20 TABLET, FILM COATED, EXTENDED RELEASE ORAL
Qty: 90 | Refills: 3 | Status: ACTIVE | COMMUNITY
Start: 2024-05-20 | End: 1900-01-01

## 2024-05-20 RX ORDER — APIXABAN 5 MG/1
5 TABLET, FILM COATED ORAL
Qty: 180 | Refills: 3 | Status: ACTIVE | COMMUNITY
Start: 2019-10-04 | End: 1900-01-01

## 2024-05-20 RX ORDER — METOPROLOL SUCCINATE 50 MG/1
50 TABLET, EXTENDED RELEASE ORAL DAILY
Qty: 90 | Refills: 3 | Status: ACTIVE | COMMUNITY
Start: 1900-01-01 | End: 1900-01-01

## 2024-05-20 NOTE — REASON FOR VISIT
[FreeTextEntry1] : 79   year old French speaking patient native of [facility] [place]  presenting for cardiac re evaluation   Following a 3-day hospitalization at [facility] for lower extremity edema.  Problems include: 1.  Severe pulmonary hypertension 2.  Severe obstructive sleep apnea AHI greater than 60; noncompliant with regular CPAP 3.  Persistent atrial fibrillation (  s/p previous  DCC ( 6/19/20).    Patient had presented there on 5/8/2024 with worsening lower extremity edema.  She remains noncompliant with CPAP therapy.  She had recently traveled to her native [place] [facility].  Lower extremity venous duplex showed no evidence of thrombosis. A repeat echocardiogram showed biatrial dilatation, mild to moderate mitral regurgitation, PA pressures of 38.  In comparison to a prior echocardiogram the PA pressures seemed much lower.  Holter monitor to assess her rate/rhythm revealed max rate of 111 bpm and she was converted to Toprol-XL 50 mg daily.  Mentions some balance issues which are chronic.  7/12/21 HST     AHI-  65.6 compatible with severe obstructive sleep apnea with sai O2 63% Being managed by Dr Brunilda Adair   But would seem that she has completely noncompliant with CPAP therapy.  Previous cardiac work up through [facility]:  Underwent external cardioversion on 6/19/20 which converted her temporarily to SR.   Apparently there might be some non-compliance issues in the past  with her meds ( rhymol and Eliquis) Intolerance of Flecanide due to dizzy spells   Pharmacologic stress test from 9/18/19 demonstrated normal rest-stress myocardial perfusion, ECG negative for ischemia and EF 74%  9/26/19 echo  NL LV  70-75% Borderline LVH Mod. TR Mild- Mod. MR Mildly elevated PAP 37.7 mmHg Mild aortic valve sclerosis.  Carotid Doppler 9/26/19 Right- Mild of the distal RCCA, minimal soft plaque in the bulb and prox. MERY Left-  Mild plaque at the distal LCCA, buld and prox. MERY  Nuclear stress test 9/18/2019: Rest stress myocardial perfusion. No evidence of ischemia Normal gated left ventricular systolic function.

## 2024-05-20 NOTE — ASSESSMENT
[FreeTextEntry1] : ECG-Atrial fibrillation with a ventricular rate of 71 bpm. Poor R wave progression. Consider anteroseptal infarct.  Lab data ------9/19/20---11/23/21--10/7/22--5/10/24 Cho---173--------151--------153-------179 HDL----67---------71---------60---------108 LDL----90---------70---------80----------62 Trig----73---------52---------57----------46  Home sleep study 5/14/2021: Respiratory event index 8.7 consistent with mild obstructive sleep apnea. Lowest saturation 75% AutoPap titration was recommended  Attended sleep study 7/12/2021: AHI equals 65.6 Richard O2 63% Impression severe obstructive sleep apnea  [facility] echocardiogram 5/9/2024 LVEF 60 to 65% Mild biatrial enlargement Mild to moderate mitral regurgitation PA pressure 38  mmHg.  Echocardiogram 12/11/2023 Normal LV size and function LVEF 60 to 65% Severe left atrial and mild right atrial dilatation Mild mitral regurgitation moderate to severe tricuspid digitation Pulmonary pressures 64 consistent with severe pulm hypertension. Unchanged compared to prior study 1 year ago  Echocardiogram 12/12/2022: Normal for size and function LVEF 55 to 60% RVH with normal function Moderate left atrial enlargement Mild MR and severe TR Severe pulmonary hypertension 62 mmHg  Echocardiogram 2/17/2021: LVEF 55 to 60% Severe tricuspid regurgitation Pulmonary systolic pressure is 58. Biatrial enlargement. Mild RVH.  9/26/19 echo NL LV 70-75% Borderline LVH Mod. TR Mild- Mod. MR Mildly elevated PAP 37.7 mmHg Mild aortic valve sclerosis.  Carotid disease Right- Mild plaque at the distal RCCA, minimal soft plaque in the bulb and prox. MERY Left- Mild plaque at the distal LCCA, bulb, and prox. MERY  Impression 79-year-old female with severe pulm hypertension, severe obstructive sleep apnea, persistent atrial fibrillation,   Following hospitalization for progressive lower extremity edema.  1. AFIB initially paroxysmal transitioning back and forth in sinus and now seemingly in persistent A. fib. An earlier cardioversion temporarily converted her to SR initially. Holter monitor demonstrated persistent atrial fibrillation with an average heart rate of 82 and a range of . - Anticoagulated on Eliquis.  2. Last Melissa sestamibi stress was negative for ischemia. Does have mild WIGGINS with chest pressure but probably related to deconditioning and moderately severe pulmonary hypertension.  3.   Hospital echo shows a lower degree of pulmonary hypertension but suspect that that is an error.  4. Evidence of severe obstructive sleep apnea on the attended study as opposed to mild on the home sleep study.  On CPAP x 6 month with pulmonary follow-up shortly.  5. BP today controlled.  6. b/l carotid disease, mild.  7. Echocardiography   With conflicting levels of pulmonary artery pressure.  Suspect that this severe measures that we have obtained previously are more accurate.  8. Hyperlipidemia: Appears to be adequately controlled.  Plan 1. Follow-up with sleep medicine.  Unfortunately, no improvement in the PA pressures despite the use of CPAP.  2. For smoother rate control of AFIB, continue metoprolol ER 50 QD.  3. Both Mrs. Mosquera and her daughter understand the importance of maintaining some type of formal exercise pattern and strict diet modifications. Especially as regards carbohydrates.  4.   Would continue furosemide 40 with potassium 20 mill colons daily.  5. Instructed patient to limit caffeine and alcohol intake.  6. Instructed patient about the absolute need for follow-up with sleep medicine regarding treatment of what is now severe obstructive sleep apnea. She was a no-show for her last telehealth visit with pulmonary.  7. Reviewed in detail with the patient the relationship between her weight, her sleep apnea, her pulmonary hypertension, her atrial fibrillation.  8. Repeat echocardiogram in 1 year  to reassess pulmonary hypertension,

## 2024-05-20 NOTE — HISTORY OF PRESENT ILLNESS
[FreeTextEntry1] : No family hx CAD   Denies any chest pain or sob.   Activity is very limited and she ambulates now with the use of a cane. Today she is in AFIB but is not aware   Denies CP, WIGGINS  or orthopnea.  Concerned about increasing the venous markings on her legs.  Admits to dietary indiscretions particularly with carbohydrates   .

## 2024-05-20 NOTE — PHYSICAL EXAM
[General Appearance - Well Developed] : well developed [Normal Appearance] : normal appearance [Normal Conjunctiva] : the conjunctiva exhibited no abnormalities [Eyelids - No Xanthelasma] : the eyelids demonstrated no xanthelasmas [Normal Oral Mucosa] : normal oral mucosa [No Oral Pallor] : no oral pallor [No Oral Cyanosis] : no oral cyanosis [Normal Oropharynx] : normal oropharynx [Normal Jugular Venous A Waves Present] : normal jugular venous A waves present [Normal Jugular Venous V Waves Present] : normal jugular venous V waves present [No Jugular Venous Burt A Waves] : no jugular venous burt A waves [Respiration, Rhythm And Depth] : normal respiratory rhythm and effort [Exaggerated Use Of Accessory Muscles For Inspiration] : no accessory muscle use [Auscultation Breath Sounds / Voice Sounds] : lungs were clear to auscultation bilaterally [Chest Palpation] : palpation of the chest revealed no abnormalities [Lungs Percussion] : the lungs were normal to percussion [Murmurs] : no murmurs present [Arterial Pulses Normal] : the arterial pulses were normal [Edema] : no peripheral edema present [Veins - Varicosity Changes] : no varicosital changes were noted in the lower extremities [Bowel Sounds] : normal bowel sounds [Abdomen Soft] : soft [Abdomen Tenderness] : non-tender [] : no hepato-splenomegaly [Abdomen Mass (___ Cm)] : no abdominal mass palpated [Abdomen Hernia] : no hernia was discovered [Abnormal Walk] : normal gait [Skin Turgor] : normal skin turgor [Skin Color & Pigmentation] : normal skin color and pigmentation [FreeTextEntry1] : Multiple subcutaneous lipomas.

## 2024-05-27 ENCOUNTER — EMERGENCY (EMERGENCY)
Facility: HOSPITAL | Age: 79
LOS: 1 days | Discharge: HOME | End: 2024-05-27
Attending: EMERGENCY MEDICINE
Payer: MEDICARE

## 2024-05-27 VITALS
DIASTOLIC BLOOD PRESSURE: 82 MMHG | HEART RATE: 110 BPM | WEIGHT: 154.32 LBS | OXYGEN SATURATION: 95 % | HEIGHT: 62 IN | TEMPERATURE: 103 F | SYSTOLIC BLOOD PRESSURE: 143 MMHG | RESPIRATION RATE: 20 BRPM

## 2024-05-27 VITALS — SYSTOLIC BLOOD PRESSURE: 123 MMHG | DIASTOLIC BLOOD PRESSURE: 56 MMHG | HEART RATE: 82 BPM

## 2024-05-27 DIAGNOSIS — Z90.711 ACQUIRED ABSENCE OF UTERUS WITH REMAINING CERVICAL STUMP: Chronic | ICD-10-CM

## 2024-05-27 LAB
ALBUMIN SERPL ELPH-MCNC: 3 G/DL — LOW (ref 3.3–5.2)
ALP SERPL-CCNC: 82 U/L — SIGNIFICANT CHANGE UP (ref 40–120)
ALT FLD-CCNC: 23 U/L — SIGNIFICANT CHANGE UP
ANION GAP SERPL CALC-SCNC: 14 MMOL/L — SIGNIFICANT CHANGE UP (ref 5–17)
APPEARANCE UR: CLEAR — SIGNIFICANT CHANGE UP
APTT BLD: 37.9 SEC — HIGH (ref 24.5–35.6)
AST SERPL-CCNC: 32 U/L — HIGH
BACTERIA # UR AUTO: NEGATIVE /HPF — SIGNIFICANT CHANGE UP
BASE EXCESS BLDV CALC-SCNC: 4.1 MMOL/L — HIGH (ref -2–3)
BASOPHILS # BLD AUTO: 0.03 K/UL — SIGNIFICANT CHANGE UP (ref 0–0.2)
BASOPHILS NFR BLD AUTO: 0.3 % — SIGNIFICANT CHANGE UP (ref 0–2)
BILIRUB SERPL-MCNC: 1.3 MG/DL — SIGNIFICANT CHANGE UP (ref 0.4–2)
BILIRUB UR-MCNC: NEGATIVE — SIGNIFICANT CHANGE UP
BUN SERPL-MCNC: 11.7 MG/DL — SIGNIFICANT CHANGE UP (ref 8–20)
CA-I SERPL-SCNC: 1.04 MMOL/L — LOW (ref 1.15–1.33)
CALCIUM SERPL-MCNC: 7.9 MG/DL — LOW (ref 8.4–10.5)
CAST: 1 /LPF — SIGNIFICANT CHANGE UP (ref 0–4)
CHLORIDE BLDV-SCNC: 98 MMOL/L — SIGNIFICANT CHANGE UP (ref 96–108)
CHLORIDE SERPL-SCNC: 94 MMOL/L — LOW (ref 96–108)
CO2 SERPL-SCNC: 24 MMOL/L — SIGNIFICANT CHANGE UP (ref 22–29)
COLOR SPEC: YELLOW — SIGNIFICANT CHANGE UP
CREAT SERPL-MCNC: 0.76 MG/DL — SIGNIFICANT CHANGE UP (ref 0.5–1.3)
DIFF PNL FLD: ABNORMAL
EGFR: 79 ML/MIN/1.73M2 — SIGNIFICANT CHANGE UP
EOSINOPHIL # BLD AUTO: 0.02 K/UL — SIGNIFICANT CHANGE UP (ref 0–0.5)
EOSINOPHIL NFR BLD AUTO: 0.2 % — SIGNIFICANT CHANGE UP (ref 0–6)
FLUAV AG NPH QL: SIGNIFICANT CHANGE UP
FLUBV AG NPH QL: SIGNIFICANT CHANGE UP
GAS PNL BLDV: 131 MMOL/L — LOW (ref 136–145)
GAS PNL BLDV: SIGNIFICANT CHANGE UP
GLUCOSE BLDV-MCNC: 98 MG/DL — SIGNIFICANT CHANGE UP (ref 70–99)
GLUCOSE SERPL-MCNC: 96 MG/DL — SIGNIFICANT CHANGE UP (ref 70–99)
GLUCOSE UR QL: NEGATIVE MG/DL — SIGNIFICANT CHANGE UP
HCO3 BLDV-SCNC: 27 MMOL/L — SIGNIFICANT CHANGE UP (ref 22–29)
HCT VFR BLD CALC: 37.4 % — SIGNIFICANT CHANGE UP (ref 34.5–45)
HCT VFR BLDA CALC: 40 % — SIGNIFICANT CHANGE UP
HGB BLD CALC-MCNC: 13.3 G/DL — SIGNIFICANT CHANGE UP (ref 11.7–16.1)
HGB BLD-MCNC: 12.8 G/DL — SIGNIFICANT CHANGE UP (ref 11.5–15.5)
IMM GRANULOCYTES NFR BLD AUTO: 1.1 % — HIGH (ref 0–0.9)
INR BLD: 1.67 RATIO — HIGH (ref 0.85–1.18)
KETONES UR-MCNC: NEGATIVE MG/DL — SIGNIFICANT CHANGE UP
LACTATE BLDV-MCNC: 1.6 MMOL/L — SIGNIFICANT CHANGE UP (ref 0.5–2)
LEUKOCYTE ESTERASE UR-ACNC: ABNORMAL
LIDOCAIN IGE QN: 33 U/L — SIGNIFICANT CHANGE UP (ref 22–51)
LYMPHOCYTES # BLD AUTO: 1.1 K/UL — SIGNIFICANT CHANGE UP (ref 1–3.3)
LYMPHOCYTES # BLD AUTO: 12.2 % — LOW (ref 13–44)
MCHC RBC-ENTMCNC: 30.9 PG — SIGNIFICANT CHANGE UP (ref 27–34)
MCHC RBC-ENTMCNC: 34.2 GM/DL — SIGNIFICANT CHANGE UP (ref 32–36)
MCV RBC AUTO: 90.3 FL — SIGNIFICANT CHANGE UP (ref 80–100)
MONOCYTES # BLD AUTO: 0.87 K/UL — SIGNIFICANT CHANGE UP (ref 0–0.9)
MONOCYTES NFR BLD AUTO: 9.6 % — SIGNIFICANT CHANGE UP (ref 2–14)
NEUTROPHILS # BLD AUTO: 6.92 K/UL — SIGNIFICANT CHANGE UP (ref 1.8–7.4)
NEUTROPHILS NFR BLD AUTO: 76.6 % — SIGNIFICANT CHANGE UP (ref 43–77)
NITRITE UR-MCNC: NEGATIVE — SIGNIFICANT CHANGE UP
PCO2 BLDV: 36 MMHG — LOW (ref 39–42)
PH BLDV: 7.49 — HIGH (ref 7.32–7.43)
PH UR: 7.5 — SIGNIFICANT CHANGE UP (ref 5–8)
PLATELET # BLD AUTO: 336 K/UL — SIGNIFICANT CHANGE UP (ref 150–400)
PO2 BLDV: 47 MMHG — HIGH (ref 25–45)
POTASSIUM BLDV-SCNC: 4 MMOL/L — SIGNIFICANT CHANGE UP (ref 3.5–5.1)
POTASSIUM SERPL-MCNC: 3.9 MMOL/L — SIGNIFICANT CHANGE UP (ref 3.5–5.3)
POTASSIUM SERPL-SCNC: 3.9 MMOL/L — SIGNIFICANT CHANGE UP (ref 3.5–5.3)
PROT SERPL-MCNC: 6.4 G/DL — LOW (ref 6.6–8.7)
PROT UR-MCNC: NEGATIVE MG/DL — SIGNIFICANT CHANGE UP
PROTHROM AB SERPL-ACNC: 18.3 SEC — HIGH (ref 9.5–13)
RBC # BLD: 4.14 M/UL — SIGNIFICANT CHANGE UP (ref 3.8–5.2)
RBC # FLD: 14.3 % — SIGNIFICANT CHANGE UP (ref 10.3–14.5)
RBC CASTS # UR COMP ASSIST: 1 /HPF — SIGNIFICANT CHANGE UP (ref 0–4)
RSV RNA NPH QL NAA+NON-PROBE: SIGNIFICANT CHANGE UP
SAO2 % BLDV: 80.3 % — SIGNIFICANT CHANGE UP
SARS-COV-2 RNA SPEC QL NAA+PROBE: SIGNIFICANT CHANGE UP
SODIUM SERPL-SCNC: 132 MMOL/L — LOW (ref 135–145)
SP GR SPEC: 1.01 — SIGNIFICANT CHANGE UP (ref 1–1.03)
SQUAMOUS # UR AUTO: 0 /HPF — SIGNIFICANT CHANGE UP (ref 0–5)
UROBILINOGEN FLD QL: 1 MG/DL — SIGNIFICANT CHANGE UP (ref 0.2–1)
WBC # BLD: 9.04 K/UL — SIGNIFICANT CHANGE UP (ref 3.8–10.5)
WBC # FLD AUTO: 9.04 K/UL — SIGNIFICANT CHANGE UP (ref 3.8–10.5)
WBC UR QL: 2 /HPF — SIGNIFICANT CHANGE UP (ref 0–5)

## 2024-05-27 PROCEDURE — 71045 X-RAY EXAM CHEST 1 VIEW: CPT

## 2024-05-27 PROCEDURE — 85025 COMPLETE CBC W/AUTO DIFF WBC: CPT

## 2024-05-27 PROCEDURE — 93010 ELECTROCARDIOGRAM REPORT: CPT

## 2024-05-27 PROCEDURE — 85610 PROTHROMBIN TIME: CPT

## 2024-05-27 PROCEDURE — 96361 HYDRATE IV INFUSION ADD-ON: CPT

## 2024-05-27 PROCEDURE — 82435 ASSAY OF BLOOD CHLORIDE: CPT

## 2024-05-27 PROCEDURE — 96375 TX/PRO/DX INJ NEW DRUG ADDON: CPT

## 2024-05-27 PROCEDURE — 84132 ASSAY OF SERUM POTASSIUM: CPT

## 2024-05-27 PROCEDURE — 80053 COMPREHEN METABOLIC PANEL: CPT

## 2024-05-27 PROCEDURE — 87040 BLOOD CULTURE FOR BACTERIA: CPT

## 2024-05-27 PROCEDURE — 99285 EMERGENCY DEPT VISIT HI MDM: CPT

## 2024-05-27 PROCEDURE — 73562 X-RAY EXAM OF KNEE 3: CPT

## 2024-05-27 PROCEDURE — 99285 EMERGENCY DEPT VISIT HI MDM: CPT | Mod: 25

## 2024-05-27 PROCEDURE — 82803 BLOOD GASES ANY COMBINATION: CPT

## 2024-05-27 PROCEDURE — 83605 ASSAY OF LACTIC ACID: CPT

## 2024-05-27 PROCEDURE — 87637 SARSCOV2&INF A&B&RSV AMP PRB: CPT

## 2024-05-27 PROCEDURE — 85018 HEMOGLOBIN: CPT

## 2024-05-27 PROCEDURE — 85730 THROMBOPLASTIN TIME PARTIAL: CPT

## 2024-05-27 PROCEDURE — 82947 ASSAY GLUCOSE BLOOD QUANT: CPT

## 2024-05-27 PROCEDURE — 85014 HEMATOCRIT: CPT

## 2024-05-27 PROCEDURE — 82330 ASSAY OF CALCIUM: CPT

## 2024-05-27 PROCEDURE — 83690 ASSAY OF LIPASE: CPT

## 2024-05-27 PROCEDURE — 73562 X-RAY EXAM OF KNEE 3: CPT | Mod: 26,LT

## 2024-05-27 PROCEDURE — 84295 ASSAY OF SERUM SODIUM: CPT

## 2024-05-27 PROCEDURE — 36415 COLL VENOUS BLD VENIPUNCTURE: CPT

## 2024-05-27 PROCEDURE — 71045 X-RAY EXAM CHEST 1 VIEW: CPT | Mod: 26

## 2024-05-27 PROCEDURE — 74177 CT ABD & PELVIS W/CONTRAST: CPT | Mod: MC

## 2024-05-27 PROCEDURE — 93005 ELECTROCARDIOGRAM TRACING: CPT

## 2024-05-27 PROCEDURE — 74177 CT ABD & PELVIS W/CONTRAST: CPT | Mod: 26,MC

## 2024-05-27 PROCEDURE — 96374 THER/PROPH/DIAG INJ IV PUSH: CPT | Mod: XU

## 2024-05-27 PROCEDURE — 81001 URINALYSIS AUTO W/SCOPE: CPT

## 2024-05-27 RX ORDER — VANCOMYCIN HCL 1 G
VIAL (EA) INTRAVENOUS
Refills: 0 | Status: DISCONTINUED | OUTPATIENT
Start: 2024-05-27 | End: 2024-06-04

## 2024-05-27 RX ORDER — PIPERACILLIN AND TAZOBACTAM 4; .5 G/20ML; G/20ML
3.38 INJECTION, POWDER, LYOPHILIZED, FOR SOLUTION INTRAVENOUS ONCE
Refills: 0 | Status: COMPLETED | OUTPATIENT
Start: 2024-05-27 | End: 2024-05-27

## 2024-05-27 RX ORDER — SODIUM CHLORIDE 9 MG/ML
1550 INJECTION, SOLUTION INTRAVENOUS ONCE
Refills: 0 | Status: COMPLETED | OUTPATIENT
Start: 2024-05-27 | End: 2024-05-27

## 2024-05-27 RX ORDER — IBUPROFEN 200 MG
600 TABLET ORAL ONCE
Refills: 0 | Status: COMPLETED | OUTPATIENT
Start: 2024-05-27 | End: 2024-05-27

## 2024-05-27 RX ORDER — VANCOMYCIN HCL 1 G
1000 VIAL (EA) INTRAVENOUS ONCE
Refills: 0 | Status: COMPLETED | OUTPATIENT
Start: 2024-05-27 | End: 2024-05-27

## 2024-05-27 RX ORDER — VANCOMYCIN HCL 1 G
1000 VIAL (EA) INTRAVENOUS EVERY 12 HOURS
Refills: 0 | Status: DISCONTINUED | OUTPATIENT
Start: 2024-05-28 | End: 2024-06-04

## 2024-05-27 RX ADMIN — SODIUM CHLORIDE 1550 MILLILITER(S): 9 INJECTION, SOLUTION INTRAVENOUS at 13:31

## 2024-05-27 RX ADMIN — SODIUM CHLORIDE 1550 MILLILITER(S): 9 INJECTION, SOLUTION INTRAVENOUS at 18:26

## 2024-05-27 RX ADMIN — PIPERACILLIN AND TAZOBACTAM 200 GRAM(S): 4; .5 INJECTION, POWDER, LYOPHILIZED, FOR SOLUTION INTRAVENOUS at 13:32

## 2024-05-27 RX ADMIN — Medication 600 MILLIGRAM(S): at 13:31

## 2024-05-27 RX ADMIN — Medication 166.67 MILLIGRAM(S): at 15:27

## 2024-05-27 NOTE — ED ADULT NURSE NOTE - NS ED NOTE ABUSE SUSPICION NEGLECT YN
Received diabetic eye exam results from [facility].  Dr Chairez Found signed off and placed in triage No

## 2024-05-27 NOTE — ED ADULT NURSE REASSESSMENT NOTE - NS ED NURSE REASSESS COMMENT FT1
Pt A+Ox4. Respirations are equal and unlabored at this time. Pt speaking complete full sentences. Pt discharged at this time, IV removed, vital signs recorded, discharge paperwork provided to pt.

## 2024-05-27 NOTE — ED ADULT NURSE NOTE - OBJECTIVE STATEMENT
Pt A+Ox4 c/o a fever since Friday. Pt states + SOB, +CP, +HA, and + fevers. Pt denies chills, body aches, ABD pain, N/V/D, or dizziness. Pt states her Right and Left knee hurt. Pts daughter at bedside translating per pts request. Pt speaking complete sentences. Pt sp02 100% on room air. pt connected to  and cardiac monitor.

## 2024-05-27 NOTE — ED PROVIDER NOTE - PATIENT PORTAL LINK FT
You can access the FollowMyHealth Patient Portal offered by [facility] by registering at the following website: [email or web address]. By joining [employer]’s FollowMyHealth portal, you will also be able to view your health information using other applications (apps) compatible with our system.

## 2024-05-27 NOTE — ED PROVIDER NOTE - OBJECTIVE STATEMENT
79-year-old female past medical history of hypertension, CVA presents with fever.  Patient reports that she has had 5 days of fevers.  She reports associated epigastric abdominal pain, but no vomiting, diarrhea, cough, shortness of breath, chest pain, dysuria, hematuria.   patient also complaining of chronic arthritic left knee pain, no swelling, redness.

## 2024-05-27 NOTE — ED ADULT TRIAGE NOTE - ARRIVAL FROM
Day1 Anesthesia Post-op Assessment    Patient: Alejandro Gregory  Procedure(s) Performed: EXPLORATORY LAPAROTOMY, PARTIAL COLECTOMY WITH ANASTOMOSIS  Anesthesia type: General    Vitals Value Taken Time   Temp 38 °C (100.4 °F) 11/25/20 1517   Pulse 47 11/25/20 1517   Resp 16 11/25/20 1256   SpO2 97 % 11/25/20 1517   /74 11/25/20 1517         Patient Location: bedside  Post-op Vital Signs:stable  Level of Consciousness: awake and alert  Respiratory Status: spontaneous ventilation  Cardiovascular stable  Hydration: euvolemic  Pain Management: adequately controlled  Vomiting: none   Nausea: None  Airway Patency:patent  Post-op Assessment: no complications, patient tolerated procedure well with no complications and evidence of recall      No complications documented.    Home

## 2024-05-27 NOTE — ED ADULT NURSE REASSESSMENT NOTE - NS ED NURSE REASSESS COMMENT FT1
Pt A+Ox4. Respirations are equal and unlabored at this time. Pt speaking complete full sentences. Pt connected to  and cardiac monitor. Pt still has LR bolus running at this time. Pt left in position of comfort, wheels of stretcher locked and in the lowest position. Family at bedside.

## 2024-05-27 NOTE — ED PROVIDER NOTE - CLINICAL SUMMARY MEDICAL DECISION MAKING FREE TEXT BOX
Patient is well-appearing, states she feels better, vitals have normalized.  There is no obvious source of infection, chest x-ray is clear, abdomen is soft nontender  on reexamination and CT scan is negative.  Urine negative.  She has no leukocytosis and a normal lactate.  The patient is stable for discharge and with supportive care and we will follow-up the cultures

## 2024-05-31 DIAGNOSIS — Z20.822 CONTACT WITH AND (SUSPECTED) EXPOSURE TO COVID-19: ICD-10-CM

## 2024-05-31 DIAGNOSIS — I10 ESSENTIAL (PRIMARY) HYPERTENSION: ICD-10-CM

## 2024-05-31 DIAGNOSIS — R50.9 FEVER, UNSPECIFIED: ICD-10-CM

## 2024-05-31 DIAGNOSIS — R10.13 EPIGASTRIC PAIN: ICD-10-CM

## 2024-05-31 DIAGNOSIS — Z86.73 PERSONAL HISTORY OF TRANSIENT ISCHEMIC ATTACK (TIA), AND CEREBRAL INFARCTION WITHOUT RESIDUAL DEFICITS: ICD-10-CM

## 2024-05-31 DIAGNOSIS — M17.12 UNILATERAL PRIMARY OSTEOARTHRITIS, LEFT KNEE: ICD-10-CM

## 2024-05-31 DIAGNOSIS — R10.11 RIGHT UPPER QUADRANT PAIN: ICD-10-CM

## 2024-06-01 LAB
CULTURE RESULTS: SIGNIFICANT CHANGE UP
CULTURE RESULTS: SIGNIFICANT CHANGE UP
SPECIMEN SOURCE: SIGNIFICANT CHANGE UP
SPECIMEN SOURCE: SIGNIFICANT CHANGE UP

## 2024-06-07 ENCOUNTER — APPOINTMENT (OUTPATIENT)
Dept: PULMONOLOGY | Facility: CLINIC | Age: 79
End: 2024-06-07
Payer: MEDICARE

## 2024-06-07 VITALS — DIASTOLIC BLOOD PRESSURE: 66 MMHG | SYSTOLIC BLOOD PRESSURE: 102 MMHG

## 2024-06-07 VITALS — BODY MASS INDEX: 30.23 KG/M2 | WEIGHT: 154 LBS | HEIGHT: 60 IN

## 2024-06-07 VITALS — OXYGEN SATURATION: 98 % | RESPIRATION RATE: 16 BRPM | HEART RATE: 73 BPM

## 2024-06-07 DIAGNOSIS — G47.33 OBSTRUCTIVE SLEEP APNEA (ADULT) (PEDIATRIC): ICD-10-CM

## 2024-06-07 DIAGNOSIS — I27.21 SECONDARY PULMONARY ARTERIAL HYPERTENSION: ICD-10-CM

## 2024-06-07 DIAGNOSIS — Z71.89 OTHER SPECIFIED COUNSELING: ICD-10-CM

## 2024-06-07 DIAGNOSIS — Z92.89 PERSONAL HISTORY OF OTHER MEDICAL TREATMENT: ICD-10-CM

## 2024-06-07 PROCEDURE — G2211 COMPLEX E/M VISIT ADD ON: CPT

## 2024-06-07 PROCEDURE — 99215 OFFICE O/P EST HI 40 MIN: CPT

## 2024-06-07 NOTE — REASON FOR VISIT
[Follow-Up] : a follow-up visit [Sleep Apnea] : sleep apnea [Pulmonary Hypertension] : pulmonary hypertension [Ad Hoc ] : provided by an ad hoc  [Interpreters_FullName] : Sommer Galo [Interpreters_Relationshiptopatient] : LPN [TWNoteComboBox1] : Citizen of [place]

## 2024-06-07 NOTE — CONSULT LETTER
[FreeTextEntry3] : Anthony Adair MD FCCP\par  Pulmonary/Critical Care/Sleep Medicine\par  Department of Internal Medicine\par  \par  [facility] [employer]\par

## 2024-06-07 NOTE — CONSULT LETTER
[FreeTextEntry3] : Anthony Adair MD FCCP\par  Pulmonary/Critical Care/Sleep Medicine\par  Department of Internal Medicine\par  \par  [facility] [employer]\par

## 2024-06-07 NOTE — DISCUSSION/SUMMARY
[FreeTextEntry1] : No pulmonary contraindication to her proposed procedure\par  \par  Findings on CAT scan consistent with prior postinflammatory changes.  Region of bronchiolitis consistent with inflammation.  Follow-up CT March 2024.  Patient asymptomatic\par  \par  Findings on chest CT consistent with postinflammatory changes.  Possibly occupational induced.\par  \par  Complaints of dyspnea most likely on the basis of pulmonary hypertension, Deconditioning [de-identified] : Noncompliant [de-identified] : The pathophysiology of sleep was explained to the patient in detail. Inclusive of this was the reasoning behind and the expected response to positive airway pressure therapy. Compliance was outlined including further followup

## 2024-06-07 NOTE — REASON FOR VISIT
[Follow-Up] : a follow-up visit [Sleep Apnea] : sleep apnea [Pulmonary Hypertension] : pulmonary hypertension [Ad Hoc ] : provided by an ad hoc  [Interpreters_FullName] : Sommer Galo [Interpreters_Relationshiptopatient] : LPN [TWNoteComboBox1] : [place]

## 2024-06-07 NOTE — DISCUSSION/SUMMARY
[FreeTextEntry1] : No pulmonary contraindication to her proposed procedure\par  \par  Findings on CAT scan consistent with prior postinflammatory changes.  Region of bronchiolitis consistent with inflammation.  Follow-up CT March 2024.  Patient asymptomatic\par  \par  Findings on chest CT consistent with postinflammatory changes.  Possibly occupational induced.\par  \par  Complaints of dyspnea most likely on the basis of pulmonary hypertension, Deconditioning [de-identified] : Noncompliant [de-identified] : The pathophysiology of sleep was explained to the patient in detail. Inclusive of this was the reasoning behind and the expected response to positive airway pressure therapy. Compliance was outlined including further followup

## 2024-06-07 NOTE — HISTORY OF PRESENT ILLNESS
[TextBox_4] : 78-year-old female seen today for preop evaluation for proposed cystoscopy with bladder biopsy.\par  \par  History of pulmonary hypertension\par  History negative for leg edema, paroxysmal nocturnal dyspnea or orthopnea. [Awakes Unrefreshed] : does not awaken unrefreshed [Awakes with Dry Mouth] : does not awaken with dry mouth [Awakes with Headache] : does not awaken with headache [Snoring] : no snoring [Witnessed Apneas] : no witnessed apneas [TextBox_77] : 2am [TextBox_79] : 6am [TextBox_81] : 15 [TextBox_85] : 7 [TextBox_100] : 7/21/21 [TextBox_108] : 65.6 [TextBox_112] : 6.8 [TextBox_116] : 63 [TextBox_120] : Rem Index 85.3 [TextBox_125] : 4-16 [TextBox_158] : [facility] [TextBox_162] : 11/4/2021 [TextBox_165] : Patient remains poorly compliant without specific complaint  [ESS] : 10

## 2024-06-07 NOTE — HISTORY OF PRESENT ILLNESS
[TextBox_4] : 78-year-old female seen today for preop evaluation for proposed cystoscopy with bladder biopsy.\par  \par  History of pulmonary hypertension\par  History negative for leg edema, paroxysmal nocturnal dyspnea or orthopnea. [Awakes Unrefreshed] : does not awaken unrefreshed [Awakes with Dry Mouth] : does not awaken with dry mouth [Awakes with Headache] : does not awaken with headache [Snoring] : no snoring [Witnessed Apneas] : no witnessed apneas [TextBox_77] : 2am [TextBox_79] : 6am [TextBox_81] : 15 [TextBox_85] : 7 [TextBox_100] : 7/21/21 [TextBox_108] : 65.6 [TextBox_112] : 6.8 [TextBox_116] : 63 [TextBox_120] : Rem Index 85.3 [TextBox_125] : 4-16 [TextBox_158] : [facility] [TextBox_162] : 11/4/2021 [TextBox_165] : Patient remains poorly compliant without specific complaint  [ESS] : 10

## 2024-06-10 ENCOUNTER — OFFICE (OUTPATIENT)
Dept: URBAN - METROPOLITAN AREA CLINIC 115 | Facility: CLINIC | Age: 79
Setting detail: OPHTHALMOLOGY
End: 2024-06-10
Payer: MEDICAID

## 2024-06-10 DIAGNOSIS — H40.1111: ICD-10-CM

## 2024-06-10 DIAGNOSIS — H02.031: ICD-10-CM

## 2024-06-10 DIAGNOSIS — H16.223: ICD-10-CM

## 2024-06-10 DIAGNOSIS — H04.553: ICD-10-CM

## 2024-06-10 DIAGNOSIS — H02.034: ICD-10-CM

## 2024-06-10 PROCEDURE — 99213 OFFICE O/P EST LOW 20 MIN: CPT | Performed by: OPHTHALMOLOGY

## 2024-06-10 PROCEDURE — 92133 CPTRZD OPH DX IMG PST SGM ON: CPT | Performed by: OPHTHALMOLOGY

## 2024-06-10 ASSESSMENT — LID POSITION - ENTROPION
OD_ENTROPION: RUL 2+
OS_ENTROPION: LUL 2+

## 2024-06-10 ASSESSMENT — LID EXAM ASSESSMENTS: OD_TRICHIASIS: RUL 2+

## 2024-06-10 ASSESSMENT — CONFRONTATIONAL VISUAL FIELD TEST (CVF)
OS_FINDINGS: FULL
OD_FINDINGS: FULL

## 2024-06-10 ASSESSMENT — LID POSITION - ECTROPION: OD_ECTROPION: RLL 2+

## 2024-07-03 ENCOUNTER — APPOINTMENT (OUTPATIENT)
Dept: CARDIOLOGY | Facility: CLINIC | Age: 79
End: 2024-07-03

## 2024-08-16 ENCOUNTER — NON-APPOINTMENT (OUTPATIENT)
Age: 79
End: 2024-08-16

## 2024-08-16 ENCOUNTER — APPOINTMENT (OUTPATIENT)
Dept: PULMONOLOGY | Facility: CLINIC | Age: 79
End: 2024-08-16
Payer: MEDICARE

## 2024-08-16 VITALS
OXYGEN SATURATION: 98 % | BODY MASS INDEX: 29.26 KG/M2 | HEIGHT: 60.5 IN | RESPIRATION RATE: 16 BRPM | WEIGHT: 153 LBS | HEART RATE: 72 BPM | DIASTOLIC BLOOD PRESSURE: 68 MMHG | SYSTOLIC BLOOD PRESSURE: 124 MMHG

## 2024-08-16 DIAGNOSIS — I27.21 SECONDARY PULMONARY ARTERIAL HYPERTENSION: ICD-10-CM

## 2024-08-16 DIAGNOSIS — Z71.89 OTHER SPECIFIED COUNSELING: ICD-10-CM

## 2024-08-16 PROCEDURE — 99215 OFFICE O/P EST HI 40 MIN: CPT

## 2024-08-16 PROCEDURE — G2211 COMPLEX E/M VISIT ADD ON: CPT

## 2024-08-16 NOTE — DISCUSSION/SUMMARY
[FreeTextEntry1] : 79-year-old female with a history of severe obstructive sleep apnea complicated by severe pulmonary hypertension and right ventricular dysfunction.  Patient's treatment is being compromised by her poor compliance secondary to mask fit.  Patient was fitted with a fullface extra small Ghulam mask by me and her system was reset to the proper settings.  Pending evaluation by pulmonary hypertension specialist
Initial (On Arrival)

## 2024-08-16 NOTE — HISTORY OF PRESENT ILLNESS
[Obstructive Sleep Apnea] : obstructive sleep apnea [Awakes Unrefreshed] : awakes unrefreshed [Awakes with Dry Mouth] : awakes with dry mouth [Snoring] : snoring [Witnessed Apneas] : witnessed apneas [Lab] : lab [APAP:] : APAP [Nasal mask] : nasal mask [TextBox_4] : Severe right heart failure and pulmonary hypertension.  Pending evaluation by Dr. Johns [TextBox_100] : 7/12/2021 [TextBox_108] : 65.6 [TextBox_112] : 6.8% [TextBox_116] : 63 [TextBox_120] : REM 85.3, TST 339min [TextBox_125] : 4-16 [TextBox_127] :  7/1/2024 [TextBox_129] : 8/8/2024 [TextBox_133] : 59 [TextBox_137] : 2.6 [TextBox_141] : 1 [TextBox_143] : 35 [TextBox_147] : 1.2 [TextBox_158] : [facility] [facility] [TextBox_160] : [facility] [TextBox_162] : 3/17/2022 [TextBox_165] : Noncompliant, complains of air leakage from her mouth as well as discomfort of the nasal mask at the bridge of her nose [employer]

## 2024-08-16 NOTE — REASON FOR VISIT
[Follow-Up] : a follow-up visit [Sleep Apnea] : sleep apnea [Pulmonary Hypertension] : pulmonary hypertension [Ad Hoc ] : provided by an ad hoc  [Interpreters_FullName] : Curtis Kc [Interpreters_Relationshiptopatient] : MA [TWNoteComboBox1] : Ugandan

## 2024-08-21 ENCOUNTER — APPOINTMENT (OUTPATIENT)
Dept: CARDIOLOGY | Facility: CLINIC | Age: 79
End: 2024-08-21
Payer: MEDICARE

## 2024-08-21 VITALS
WEIGHT: 155 LBS | SYSTOLIC BLOOD PRESSURE: 120 MMHG | BODY MASS INDEX: 30.43 KG/M2 | HEIGHT: 60 IN | DIASTOLIC BLOOD PRESSURE: 50 MMHG | RESPIRATION RATE: 16 BRPM | HEART RATE: 57 BPM

## 2024-08-21 DIAGNOSIS — G47.33 OBSTRUCTIVE SLEEP APNEA (ADULT) (PEDIATRIC): ICD-10-CM

## 2024-08-21 DIAGNOSIS — R60.0 LOCALIZED EDEMA: ICD-10-CM

## 2024-08-21 DIAGNOSIS — I77.9 DISORDER OF ARTERIES AND ARTERIOLES, UNSPECIFIED: ICD-10-CM

## 2024-08-21 DIAGNOSIS — I48.20 CHRONIC ATRIAL FIBRILLATION, UNSP: ICD-10-CM

## 2024-08-21 PROCEDURE — 99214 OFFICE O/P EST MOD 30 MIN: CPT

## 2024-08-21 PROCEDURE — 93000 ELECTROCARDIOGRAM COMPLETE: CPT

## 2024-08-21 PROCEDURE — G2211 COMPLEX E/M VISIT ADD ON: CPT

## 2024-08-21 NOTE — PHYSICAL EXAM
[General Appearance - Well Developed] : well developed [Normal Appearance] : normal appearance [Normal Conjunctiva] : the conjunctiva exhibited no abnormalities [Eyelids - No Xanthelasma] : the eyelids demonstrated no xanthelasmas [Normal Oral Mucosa] : normal oral mucosa [No Oral Pallor] : no oral pallor [No Oral Cyanosis] : no oral cyanosis [Normal Oropharynx] : normal oropharynx [Normal Jugular Venous A Waves Present] : normal jugular venous A waves present [Normal Jugular Venous V Waves Present] : normal jugular venous V waves present [No Jugular Venous Burt A Waves] : no jugular venous burt A waves [Respiration, Rhythm And Depth] : normal respiratory rhythm and effort [Exaggerated Use Of Accessory Muscles For Inspiration] : no accessory muscle use [Auscultation Breath Sounds / Voice Sounds] : lungs were clear to auscultation bilaterally [Chest Palpation] : palpation of the chest revealed no abnormalities [Lungs Percussion] : the lungs were normal to percussion [Murmurs] : no murmurs present [Arterial Pulses Normal] : the arterial pulses were normal [Edema] : no peripheral edema present [Veins - Varicosity Changes] : no varicosital changes were noted in the lower extremities [Bowel Sounds] : normal bowel sounds [Abdomen Soft] : soft [Abdomen Tenderness] : non-tender [] : no hepato-splenomegaly [Abdomen Mass (___ Cm)] : no abdominal mass palpated [Abdomen Hernia] : no hernia was discovered [Abnormal Walk] : normal gait [Skin Color & Pigmentation] : normal skin color and pigmentation [Skin Turgor] : normal skin turgor [FreeTextEntry1] : Multiple subcutaneous lipomas.

## 2024-08-21 NOTE — ASSESSMENT
[FreeTextEntry1] : ECG-Atrial fibrillation with a ventricular rate of 57 bpm. Poor R wave progression. Consider anteroseptal infarct.  Lab data ------9/19/20---11/23/21--10/7/22--5/10/24 Cho---173--------151--------153-------179 HDL----67---------71---------60---------108 LDL----90---------70---------80----------62 Trig----73---------52---------57----------46  Home sleep study 5/14/2021: Respiratory event index 8.7 consistent with mild obstructive sleep apnea. Lowest saturation 75% AutoPap titration was recommended  Attended sleep study 7/12/2021: AHI equals 65.6 Richard O2 63% Impression severe obstructive sleep apnea  [facility] echocardiogram 5/9/2024 LVEF 60 to 65% Mild biatrial enlargement Mild to moderate mitral regurgitation PA pressure 38  mmHg.  Echocardiogram 12/11/2023 Normal LV size and function LVEF 60 to 65% Severe left atrial and mild right atrial dilatation Mild mitral regurgitation moderate to severe tricuspid digitation Pulmonary pressures 64 consistent with severe pulm hypertension. Unchanged compared to prior study 1 year ago  Echocardiogram 12/12/2022: Normal for size and function LVEF 55 to 60% RVH with normal function Moderate left atrial enlargement Mild MR and severe TR Severe pulmonary hypertension 62 mmHg  Echocardiogram 2/17/2021: LVEF 55 to 60% Severe tricuspid regurgitation Pulmonary systolic pressure is 58. Biatrial enlargement. Mild RVH.  9/26/19 echo NL LV 70-75% Borderline LVH Mod. TR Mild- Mod. MR Mildly elevated PAP 37.7 mmHg Mild aortic valve sclerosis.  Carotid disease Right- Mild plaque at the distal RCCA, minimal soft plaque in the bulb and prox. MERY Left- Mild plaque at the distal LCCA, bulb, and prox. MERY  Impression 79-year-old female with severe pulm hypertension, severe obstructive sleep apnea, persistent atrial fibrillation,   Following hospitalization for progressive lower extremity edema.  1. AFIB initially paroxysmal transitioning back and forth in sinus and now seemingly in persistent A. fib. An earlier cardioversion temporarily converted her to SR initially. Holter monitor demonstrated persistent atrial fibrillation with an average heart rate of 82 and a range of . - Anticoagulated on Eliquis.  2. Last Melissa sestamibi stress was negative for ischemia. Does have mild WIGGINS with chest pressure but probably related to deconditioning and moderately severe pulmonary hypertension.  3.   Hospital echo shows a lower degree of pulmonary hypertension but suspect that that is an error.  4. Evidence of severe obstructive sleep apnea on the attended study as opposed to mild on the home sleep study.  On CPAP but with limited compliance.  5. BP today controlled.  6. b/l carotid disease, mild.  7. Echocardiography   With conflicting levels of pulmonary artery pressure.  Suspect that this severe measures that we have obtained previously are more accurate.  8. Hyperlipidemia: Appears to be adequately controlled.  9.  Edema substantially improved with change from furosemide to torsemide and spironolactone.  Plan 1. Follow-up with sleep medicine.  Strongly encouraged patient to continue use of CPAP explaining in detail the consequences of failure to do so.  2. For smoother rate control of AFIB, continue metoprolol ER 50 QD.  3. Both Mrs. Mosquera and her daughter understand the importance of maintaining some type of formal exercise pattern and strict diet modifications. Especially as regards carbohydrates.  4.   Discontinue supplemental potassium.  5. Instructed patient to limit caffeine and alcohol intake.  6. Instructed patient about the absolute need for follow-up with sleep medicine regarding treatment of what is now severe obstructive sleep apnea.   7. Reviewed in detail with the patient the relationship between her weight, her sleep apnea, her pulmonary hypertension, her atrial fibrillation.  8. Repeat echocardiogram next spring to reassess pulmonary hypertension.

## 2024-08-21 NOTE — HISTORY OF PRESENT ILLNESS
[FreeTextEntry1] : Patient was recently converted from furosemide to torsemide 20 mg and spironolactone 50 mg (7/15/2024) with improvement in her lower extremity edema. States that she continues to take potassium supplementation. Down 5 pounds which ilikely reflects a reduction in her edema.  Denies any chest pain or sob.   Activity is very limited and she ambulates now with the use of a cane. Today she is in AFIB but is not aware   Denies CP, WIGGINS  or orthopnea.  Concerned about increasing the venous markings on her legs.  Admits to dietary indiscretions particularly with carbohydrates   .

## 2024-08-21 NOTE — REASON FOR VISIT
[FreeTextEntry1] : 79   year old Hungarian speaking patient native of [facility]  presenting for cardiac re evaluation    Problems include: 1.  Severe pulmonary hypertension 2.  Severe obstructive sleep apnea AHI greater than 60; noncompliant with regular CPAP 3.  Persistent atrial fibrillation (  s/p previous  DCC ( 6/19/20).    Patient had presented there on 5/8/2024 with worsening lower extremity edema.  She remains noncompliant with CPAP therapy.  She had recently traveled to her native [facility].  Lower extremity venous duplex showed no evidence of thrombosis. A repeat hospital echocardiogram showed biatrial dilatation, mild to moderate mitral regurgitation, PA pressures of 38.  In comparison to a prior echocardiogram the PA pressures seemed much lower.  Holter monitor to assess her rate/rhythm revealed max rate of 111 bpm and she was converted to Toprol-XL 50 mg daily.  Mentions some balance issues which are chronic.  7/12/21 HST     AHI-  65.6 compatible with severe obstructive sleep apnea with sai O2 63% Being managed by Dr Brunilda Adair   But would seem that she has completely noncompliant with CPAP therapy.  Previous cardiac work up through [facility]:  Underwent external cardioversion on 6/19/20 which converted her temporarily to SR.   Apparently there might be some non-compliance issues in the past  with her meds ( rhymol and Eliquis) Intolerance of Flecanide due to dizzy spells   Pharmacologic stress test from 9/18/19 demonstrated normal rest-stress myocardial perfusion, ECG negative for ischemia and EF 74%  9/26/19 echo  NL LV  70-75% Borderline LVH Mod. TR Mild- Mod. MR Mildly elevated PAP 37.7 mmHg Mild aortic valve sclerosis.  Carotid Doppler 9/26/19 Right- Mild of the distal RCCA, minimal soft plaque in the bulb and prox. MERY Left-  Mild plaque at the distal LCCA, buld and prox. MERY  Nuclear stress test 9/18/2019: Rest stress myocardial perfusion. No evidence of ischemia Normal gated left ventricular systolic function.

## 2024-08-29 ENCOUNTER — OFFICE (OUTPATIENT)
Dept: URBAN - METROPOLITAN AREA CLINIC 115 | Facility: CLINIC | Age: 79
Setting detail: OPHTHALMOLOGY
End: 2024-08-29
Payer: MEDICAID

## 2024-08-29 DIAGNOSIS — H15.101: ICD-10-CM

## 2024-08-29 DIAGNOSIS — H16.223: ICD-10-CM

## 2024-08-29 PROCEDURE — 99212 OFFICE O/P EST SF 10 MIN: CPT | Performed by: OPTOMETRIST

## 2024-08-29 ASSESSMENT — LID EXAM ASSESSMENTS: OD_TRICHIASIS: RUL 2+

## 2024-08-29 ASSESSMENT — LID POSITION - ENTROPION
OS_ENTROPION: LUL 2+
OD_ENTROPION: RUL 2+

## 2024-08-29 ASSESSMENT — LID POSITION - ECTROPION: OD_ECTROPION: RLL 2+

## 2024-08-29 ASSESSMENT — CONFRONTATIONAL VISUAL FIELD TEST (CVF)
OD_FINDINGS: FULL
OS_FINDINGS: FULL

## 2024-08-30 ENCOUNTER — APPOINTMENT (OUTPATIENT)
Dept: PULMONOLOGY | Facility: CLINIC | Age: 79
End: 2024-08-30

## 2024-08-30 VITALS
HEART RATE: 61 BPM | RESPIRATION RATE: 16 BRPM | OXYGEN SATURATION: 98 % | DIASTOLIC BLOOD PRESSURE: 60 MMHG | SYSTOLIC BLOOD PRESSURE: 128 MMHG

## 2024-08-30 PROCEDURE — 99205 OFFICE O/P NEW HI 60 MIN: CPT

## 2024-08-30 RX ORDER — SPIRONOLACTONE 25 MG/1
25 TABLET ORAL
Refills: 0 | Status: ACTIVE | COMMUNITY
Start: 2024-08-30

## 2024-08-30 RX ORDER — TORSEMIDE 20 MG/1
20 TABLET ORAL DAILY
Qty: 30 | Refills: 3 | Status: ACTIVE | COMMUNITY
Start: 2024-08-30

## 2024-08-30 NOTE — REASON FOR VISIT
[Pulmonary Hypertension] : pulmonary hypertension [Initial] : an initial visit [Shortness of Breath] : shortness of breath

## 2024-09-02 NOTE — HISTORY OF PRESENT ILLNESS
[TextBox_4] :   Some issues with heavy exercise, walking fast her breathing issues have been worse  No issues with bending over  No issues with talking on the phone  Never smoker  [place], 1987 moved here

## 2024-10-01 ENCOUNTER — APPOINTMENT (OUTPATIENT)
Dept: PULMONOLOGY | Facility: CLINIC | Age: 79
End: 2024-10-01

## 2024-10-14 ENCOUNTER — OFFICE (OUTPATIENT)
Dept: URBAN - METROPOLITAN AREA CLINIC 115 | Facility: CLINIC | Age: 79
Setting detail: OPHTHALMOLOGY
End: 2024-10-14
Payer: MEDICAID

## 2024-10-14 DIAGNOSIS — H16.223: ICD-10-CM

## 2024-10-14 DIAGNOSIS — H15.101: ICD-10-CM

## 2024-10-14 DIAGNOSIS — Z96.1: ICD-10-CM

## 2024-10-14 DIAGNOSIS — H02.034: ICD-10-CM

## 2024-10-14 DIAGNOSIS — H02.031: ICD-10-CM

## 2024-10-14 DIAGNOSIS — H40.1111: ICD-10-CM

## 2024-10-14 PROCEDURE — 92250 FUNDUS PHOTOGRAPHY W/I&R: CPT | Performed by: OPHTHALMOLOGY

## 2024-10-14 PROCEDURE — 99213 OFFICE O/P EST LOW 20 MIN: CPT | Performed by: OPHTHALMOLOGY

## 2024-10-14 ASSESSMENT — REFRACTION_CURRENTRX
OS_ADD: +3.00
OS_AXIS: 066
OS_CYLINDER: -0.75
OS_CYLINDER: -2.00
OS_AXIS: 090
OD_OVR_VA: 20/
OS_ADD: +2.25
OD_AXIS: 066
OD_ADD: +2.75
OD_CYLINDER: -0.50
OD_CYLINDER: -0.50
OS_SPHERE: +3.75
OS_VPRISM_DIRECTION: PROGS
OS_CYLINDER: -1.00
OS_AXIS: 067
OD_AXIS: 075
OD_AXIS: 077
OS_OVR_VA: 20/
OS_SPHERE: +0.25
OS_OVR_VA: 20/
OD_SPHERE: +3.00
OD_CYLINDER: -3.00
OS_SPHERE: +3.75
OS_CYLINDER: -2.00
OS_ADD: +2.00
OD_VPRISM_DIRECTION: PROGS
OS_AXIS: 072
OS_SPHERE: +0.25
OD_VPRISM_DIRECTION: PROGS
OD_OVR_VA: 20/
OD_OVR_VA: 20/
OD_VPRISM_DIRECTION: PROGS
OD_SPHERE: +0.50
OD_SPHERE: +0.75
OD_SPHERE: +3.50
OS_ADD: +1.50
OS_OVR_VA: 20/
OD_ADD: +2.00
OD_ADD: +1.50
OD_CYLINDER: -2.50
OD_AXIS: 082
OS_VPRISM_DIRECTION: PROGS
OD_ADD: +2.25
OS_VPRISM_DIRECTION: PROGS

## 2024-10-14 ASSESSMENT — LID POSITION - ECTROPION: OD_ECTROPION: RLL 2+

## 2024-10-14 ASSESSMENT — LID POSITION - ENTROPION
OS_ENTROPION: LUL 2+
OD_ENTROPION: RUL 2+

## 2024-10-14 ASSESSMENT — REFRACTION_MANIFEST
OS_VA1: 20/25
OS_AXIS: 90
OS_CYLINDER: -0.75
OD_ADD: +2.75
OS_SPHERE: +0.25
OD_AXIS: 65
OD_SPHERE: +0.50
OD_CYLINDER: -0.75
OD_VA1: 20/25
OS_ADD: +2.75

## 2024-10-14 ASSESSMENT — TONOMETRY
OD_IOP_MMHG: 13
OD_IOP_MMHG: 14
OS_IOP_MMHG: 14
OS_IOP_MMHG: 14

## 2024-10-14 ASSESSMENT — KERATOMETRY
OD_AXISANGLE_DEGREES: 144
OD_K2POWER_DIOPTERS: 44.00
OS_K1POWER_DIOPTERS: 42.75
OS_AXISANGLE_DEGREES: 159
OS_K2POWER_DIOPTERS: 43.75
METHOD_AUTO_MANUAL: AUTO
OD_K1POWER_DIOPTERS: 42.75

## 2024-10-14 ASSESSMENT — REFRACTION_AUTOREFRACTION
OS_SPHERE: +1.00
OD_AXIS: 073
OD_CYLINDER: -1.25
OS_CYLINDER: -1.25
OS_AXIS: 087
OD_SPHERE: +1.25

## 2024-10-14 ASSESSMENT — PACHYMETRY
OD_CT_UM: 523
OD_CT_CORRECTION: 1
OS_CT_CORRECTION: -1
OS_CT_UM: 555

## 2024-10-14 ASSESSMENT — LID EXAM ASSESSMENTS: OD_TRICHIASIS: RUL 2+

## 2024-10-14 ASSESSMENT — VISUAL ACUITY
OD_BCVA: 20/30
OS_BCVA: 20/25-1

## 2024-10-14 ASSESSMENT — CONFRONTATIONAL VISUAL FIELD TEST (CVF)
OS_FINDINGS: FULL
OD_FINDINGS: FULL

## 2024-10-31 ENCOUNTER — OUTPATIENT (OUTPATIENT)
Dept: OUTPATIENT SERVICES | Facility: HOSPITAL | Age: 79
LOS: 1 days | End: 2024-10-31
Payer: MEDICARE

## 2024-10-31 VITALS
WEIGHT: 158.73 LBS | OXYGEN SATURATION: 99 % | DIASTOLIC BLOOD PRESSURE: 84 MMHG | TEMPERATURE: 97 F | HEIGHT: 61 IN | HEART RATE: 71 BPM | SYSTOLIC BLOOD PRESSURE: 130 MMHG

## 2024-10-31 DIAGNOSIS — Z90.711 ACQUIRED ABSENCE OF UTERUS WITH REMAINING CERVICAL STUMP: Chronic | ICD-10-CM

## 2024-10-31 DIAGNOSIS — Z01.818 ENCOUNTER FOR OTHER PREPROCEDURAL EXAMINATION: ICD-10-CM

## 2024-10-31 LAB
ALBUMIN SERPL ELPH-MCNC: 3.6 G/DL — SIGNIFICANT CHANGE UP (ref 3.3–5.2)
ALP SERPL-CCNC: 53 U/L — SIGNIFICANT CHANGE UP (ref 40–120)
ALT FLD-CCNC: 11 U/L — SIGNIFICANT CHANGE UP
ANION GAP SERPL CALC-SCNC: 12 MMOL/L — SIGNIFICANT CHANGE UP (ref 5–17)
APTT BLD: 31 SEC — SIGNIFICANT CHANGE UP (ref 24.5–35.6)
AST SERPL-CCNC: 22 U/L — SIGNIFICANT CHANGE UP
BASOPHILS # BLD AUTO: 0.01 K/UL — SIGNIFICANT CHANGE UP (ref 0–0.2)
BASOPHILS NFR BLD AUTO: 0.2 % — SIGNIFICANT CHANGE UP (ref 0–2)
BILIRUB SERPL-MCNC: 1 MG/DL — SIGNIFICANT CHANGE UP (ref 0.4–2)
BLD GP AB SCN SERPL QL: SIGNIFICANT CHANGE UP
BUN SERPL-MCNC: 32.4 MG/DL — HIGH (ref 8–20)
CALCIUM SERPL-MCNC: 8.6 MG/DL — SIGNIFICANT CHANGE UP (ref 8.4–10.5)
CHLORIDE SERPL-SCNC: 108 MMOL/L — SIGNIFICANT CHANGE UP (ref 96–108)
CO2 SERPL-SCNC: 23 MMOL/L — SIGNIFICANT CHANGE UP (ref 22–29)
CREAT SERPL-MCNC: 1.24 MG/DL — SIGNIFICANT CHANGE UP (ref 0.5–1.3)
EGFR: 44 ML/MIN/1.73M2 — LOW
EOSINOPHIL # BLD AUTO: 0.09 K/UL — SIGNIFICANT CHANGE UP (ref 0–0.5)
EOSINOPHIL NFR BLD AUTO: 1.6 % — SIGNIFICANT CHANGE UP (ref 0–6)
GLUCOSE SERPL-MCNC: 112 MG/DL — HIGH (ref 70–99)
HCT VFR BLD CALC: 38.7 % — SIGNIFICANT CHANGE UP (ref 34.5–45)
HGB BLD-MCNC: 13 G/DL — SIGNIFICANT CHANGE UP (ref 11.5–15.5)
IMM GRANULOCYTES NFR BLD AUTO: 0.4 % — SIGNIFICANT CHANGE UP (ref 0–0.9)
INR BLD: 1.03 RATIO — SIGNIFICANT CHANGE UP (ref 0.85–1.16)
LYMPHOCYTES # BLD AUTO: 1.34 K/UL — SIGNIFICANT CHANGE UP (ref 1–3.3)
LYMPHOCYTES # BLD AUTO: 23.5 % — SIGNIFICANT CHANGE UP (ref 13–44)
MAGNESIUM SERPL-MCNC: 2.4 MG/DL — SIGNIFICANT CHANGE UP (ref 1.6–2.6)
MCHC RBC-ENTMCNC: 30.4 PG — SIGNIFICANT CHANGE UP (ref 27–34)
MCHC RBC-ENTMCNC: 33.6 G/DL — SIGNIFICANT CHANGE UP (ref 32–36)
MCV RBC AUTO: 90.4 FL — SIGNIFICANT CHANGE UP (ref 80–100)
MONOCYTES # BLD AUTO: 0.54 K/UL — SIGNIFICANT CHANGE UP (ref 0–0.9)
MONOCYTES NFR BLD AUTO: 9.5 % — SIGNIFICANT CHANGE UP (ref 2–14)
NEUTROPHILS # BLD AUTO: 3.71 K/UL — SIGNIFICANT CHANGE UP (ref 1.8–7.4)
NEUTROPHILS NFR BLD AUTO: 64.8 % — SIGNIFICANT CHANGE UP (ref 43–77)
PLATELET # BLD AUTO: 144 K/UL — LOW (ref 150–400)
POTASSIUM SERPL-MCNC: 4 MMOL/L — SIGNIFICANT CHANGE UP (ref 3.5–5.3)
POTASSIUM SERPL-SCNC: 4 MMOL/L — SIGNIFICANT CHANGE UP (ref 3.5–5.3)
PROT SERPL-MCNC: 6.6 G/DL — SIGNIFICANT CHANGE UP (ref 6.6–8.7)
PROTHROM AB SERPL-ACNC: 12 SEC — SIGNIFICANT CHANGE UP (ref 9.9–13.4)
RBC # BLD: 4.28 M/UL — SIGNIFICANT CHANGE UP (ref 3.8–5.2)
RBC # FLD: 15.2 % — HIGH (ref 10.3–14.5)
SODIUM SERPL-SCNC: 143 MMOL/L — SIGNIFICANT CHANGE UP (ref 135–145)
WBC # BLD: 5.71 K/UL — SIGNIFICANT CHANGE UP (ref 3.8–10.5)
WBC # FLD AUTO: 5.71 K/UL — SIGNIFICANT CHANGE UP (ref 3.8–10.5)

## 2024-10-31 PROCEDURE — 83735 ASSAY OF MAGNESIUM: CPT

## 2024-10-31 PROCEDURE — 85610 PROTHROMBIN TIME: CPT

## 2024-10-31 PROCEDURE — 85730 THROMBOPLASTIN TIME PARTIAL: CPT

## 2024-10-31 PROCEDURE — 93010 ELECTROCARDIOGRAM REPORT: CPT

## 2024-10-31 PROCEDURE — 36415 COLL VENOUS BLD VENIPUNCTURE: CPT

## 2024-10-31 PROCEDURE — 86850 RBC ANTIBODY SCREEN: CPT

## 2024-10-31 PROCEDURE — 93005 ELECTROCARDIOGRAM TRACING: CPT

## 2024-10-31 PROCEDURE — 80053 COMPREHEN METABOLIC PANEL: CPT

## 2024-10-31 PROCEDURE — G0463: CPT

## 2024-10-31 PROCEDURE — 85025 COMPLETE CBC W/AUTO DIFF WBC: CPT

## 2024-10-31 PROCEDURE — 86901 BLOOD TYPING SEROLOGIC RH(D): CPT

## 2024-10-31 PROCEDURE — 86900 BLOOD TYPING SEROLOGIC ABO: CPT

## 2024-10-31 RX ORDER — SPIRONOLACT/HYDROCHLOROTHIAZID 25 MG-25MG
1 TABLET ORAL
Refills: 0 | DISCHARGE

## 2024-10-31 NOTE — H&P PST ADULT - NSICDXPASTSURGICALHX_GEN_ALL_CORE_FT
Please bring any medical records available from [facility].
PAST SURGICAL HISTORY:  History of hysterectomy, supracervical

## 2024-10-31 NOTE — H&P PST ADULT - NSICDXPASTMEDICALHX_GEN_ALL_CORE_FT
PAST MEDICAL HISTORY:  Chronic atrial fibrillation     CVA (cerebrovascular accident)     Hypertension     GOYO on CPAP     Pulmonary hypertension

## 2024-10-31 NOTE — H&P PST ADULT - HISTORY OF PRESENT ILLNESS
HPI: This is an 79 year old female with PMH of AFib(on Eliquis), GOYO on CPAP, COPD, and Pulmonary Hypertensionwith an estimated PASP 38mmHg (mild) She reports shortness of breath worse with exertion. Now presents to PST for upcoming RHC with Dr. Feliciano on 11/7/2024    Summary of testing:   CPAP compliance data 7/1/2024-8/8/2024: Used 23/39 days with an average usage time of 1.5 hours, resultant AHI 3.3 when on the device.  ?  Spirometry 7/7/2024: FEV1 1.44/81%, FVC 1.97/84%, ratio 73.  ?  Echo 5/9/2024: Left ventricular cavity is normal in size. Left ventricular systolic function is normal with an ejection fraction of 65 % by Poon's method of disks with an ejection fraction visually estimated at 60 to 65 %. Normal right ventricular cavity size and normal systolic function. The left atrium is mildly dilated. The right atrium is mildly dilated. Mild to moderate mitral regurgitation. Moderate tricuspid regurgitation. Estimated pulmonary artery systolic pressure is 38 mmHg, consistent with mild pulmonary hypertension. No pericardial effusion seen.  ?  CT Chest 3/1/2023: Mild changes of COPD/centrilobular emphysema. Stable nodules in the lung fields. Demineralization of the bones.  ?    Social History:        Marital:         Tobacco:        ETOH:        Caffeine:     ROS:  CONSTITUTIONAL: No weakness, fevers or chills  EYES/ENT: No visual changes;  No vertigo or throat pain   NECK: No pain or stiffness  RESPIRATORY: No cough, wheezing, hemoptysis; No shortness of breath  CARDIOVASCULAR: No chest pain or palpitations  GASTROINTESTINAL: No abdominal or epigastric pain. No nausea, vomiting, or hematemesis; No diarrhea or constipation. No melena or hematochezia.  GENITOURINARY: No dysuria, frequency or hematuria  NEUROLOGICAL: No numbness or weakness  SKIN: No itching, burning, rashes, or lesions   All other review of systems is negative unless indicated above    PHYSICAL EXAM:    Vital Signs Last 24 Hrs  T(C): --  T(F): --  HR: --  BP: --  BP(mean): --  RR: --  SpO2: --        GENERAL: Pt lying comfortably, NAD.  ENMT: PERRL, +EOMI.  NECK: soft, Supple, No JVD,   CHEST/LUNG: Clear to auscultatation bilaterally; No wheezing.  HEART: S1S2+, Regular rate and rhythm; No murmurs.  ABDOMEN: Soft, Nontender, Nondistended; Bowel sounds present.  MUSCULOSKELETAL: Normal range of motion.  SKIN: No rashes or lesions.  NEURO: AAOX3, no focal deficits, no motor r sensory loss.  PSYCH: normal mood.  VASCULAR:   Radial +2 R/+2 L  Femoral +2 R/+2 L  PT +2 R/+2 L  DP +2 R/+2 L    EKG:            HPI: This is an 79 year old female with PMH of AFib(on Eliquis), GOYO on CPAP, COPD, and Pulmonary Hypertensionwith an estimated PASP 38mmHg (mild) She reports shortness of breath worse with exertion. Now presents to PST for upcoming RHC with Dr. Feliciano on 11/7/2024    Summary of testing:   CPAP compliance data 7/1/2024-8/8/2024: Used 23/39 days with an average usage time of 1.5 hours, resultant AHI 3.3 when on the device.  ?  Spirometry 7/7/2024: FEV1 1.44/81%, FVC 1.97/84%, ratio 73.  ?  Echo 5/9/2024: Left ventricular cavity is normal in size. Left ventricular systolic function is normal with an ejection fraction of 65 % by Poon's method of disks with an ejection fraction visually estimated at 60 to 65 %. Normal right ventricular cavity size and normal systolic function. The left atrium is mildly dilated. The right atrium is mildly dilated. Mild to moderate mitral regurgitation. Moderate tricuspid regurgitation. Estimated pulmonary artery systolic pressure is 38 mmHg, consistent with mild pulmonary hypertension. No pericardial effusion seen.  ?  CT Chest 3/1/2023: Mild changes of COPD/centrilobular emphysema. Stable nodules in the lung fields. Demineralization of the bones.  ?    Social History:        Marital:   lives with daughter, denies tobacco, ETOH, or drug use     ROS:  CONSTITUTIONAL: No weakness, fevers or chills  EYES/ENT: No visual changes;  No vertigo or throat pain   NECK: No pain or stiffness  RESPIRATORY: ++SOB worse with exertion   CARDIOVASCULAR: No chest pain or palpitations  GASTROINTESTINAL: No abdominal or epigastric pain. No nausea, vomiting, or hematemesis; No diarrhea or constipation. No melena or hematochezia.  GENITOURINARY: No dysuria, frequency or hematuria  NEUROLOGICAL: intermittent left lower back pain neuropathic c/w sciatica   SKIN: No itching, burning, rashes, or lesions   All other review of systems is negative unless indicated above    PHYSICAL EXAM:    GENERAL: Pt lying comfortably, NAD.  ENMT: PERRL, +EOMI.  NECK: soft, Supple, No JVD,   CHEST/LUNG: Clear to auscultation bilaterally; No wheezing.  HEART: S1S2+, Regular rate and rhythm; No murmurs.  ABDOMEN: Soft, Nontender, Nondistended; Bowel sounds present.  MUSCULOSKELETAL: Normal range of motion.  SKIN: No rashes or lesions.  NEURO: AAOX3, no focal deficits, no motor r sensory loss.  PSYCH: normal mood.      EKG: Atrial Fibrillation 62bpm     Labs:        HPI: This is an 79 year old female with PMH of AFib(on Eliquis), GOYO on CPAP, COPD, and Pulmonary Hypertensionwith an estimated PASP 38mmHg (mild) She reports shortness of breath worse with exertion. Now presents to PST for upcoming RHC with Dr. Feliciano on 11/7/2024    Summary of testing:   CPAP compliance data 7/1/2024-8/8/2024: Used 23/39 days with an average usage time of 1.5 hours, resultant AHI 3.3 when on the device.  ?  Spirometry 7/7/2024: FEV1 1.44/81%, FVC 1.97/84%, ratio 73.  ?  Echo 5/9/2024: Left ventricular cavity is normal in size. Left ventricular systolic function is normal with an ejection fraction of 65 % by Poon's method of disks with an ejection fraction visually estimated at 60 to 65 %. Normal right ventricular cavity size and normal systolic function. The left atrium is mildly dilated. The right atrium is mildly dilated. Mild to moderate mitral regurgitation. Moderate tricuspid regurgitation. Estimated pulmonary artery systolic pressure is 38 mmHg, consistent with mild pulmonary hypertension. No pericardial effusion seen.  ?  CT Chest 3/1/2023: Mild changes of COPD/centrilobular emphysema. Stable nodules in the lung fields. Demineralization of the bones.  ?    Social History:        Marital:   lives with daughter, denies tobacco, ETOH, or drug use     ROS:  CONSTITUTIONAL: No weakness, fevers or chills  EYES/ENT: No visual changes;  No vertigo or throat pain   NECK: No pain or stiffness  RESPIRATORY: ++SOB worse with exertion   CARDIOVASCULAR: No chest pain or palpitations  GASTROINTESTINAL: No abdominal or epigastric pain. No nausea, vomiting, or hematemesis; No diarrhea or constipation. No melena or hematochezia.  GENITOURINARY: No dysuria, frequency or hematuria  NEUROLOGICAL: intermittent left lower back pain neuropathic c/w sciatica   SKIN: No itching, burning, rashes, or lesions   All other review of systems is negative unless indicated above    PHYSICAL EXAM:    GENERAL: Pt lying comfortably, NAD.  ENMT: PERRL, +EOMI.  NECK: soft, Supple, No JVD,   CHEST/LUNG: Clear to auscultation bilaterally; No wheezing.  HEART: S1S2+, Regular rate and rhythm; No murmurs.  ABDOMEN: Soft, Nontender, Nondistended; Bowel sounds present.  MUSCULOSKELETAL: Normal range of motion.  SKIN: No rashes or lesions.  NEURO: AAOX3, no focal deficits, no motor r sensory loss.  PSYCH: normal mood.      EKG: Atrial Fibrillation 62bpm     Labs:                         13.0   5.71  )-----------( 144      ( 31 Oct 2024 09:50 )             38.7   10-31    143  |  108  |  32.4[H]  ----------------------------<  112[H]  4.0   |  23.0  |  1.24    Ca    8.6      31 Oct 2024 09:50  Mg     2.4     10-31    TPro  6.6  /  Alb  3.6  /  TBili  1.0  /  DBili  x   /  AST  22  /  ALT  11  /  AlkPhos  53  10-31  Magnesium (10.31.24 @ 09:50)    Magnesium: 2.4 mg/dL

## 2024-10-31 NOTE — H&P PST ADULT - ASSESSMENT
Risk Assessments:  ASA:  Mallampati:  Creat:   GFR:   BRA:  Pt assessed, appropriate for sedation and educated regarding plan for Versed/Fentanyl as needed    Indication:     Coronary Anatomy:     Plan:    -plan for *** via ***  -preferred access:   -confirmed appropriate NPO duration  -ECG and Labs reviewed  -Aspirin 81mg po pre-cath  -Normal Saline 0.9%  250ml/hr IV: pre procedure LINDSAY ppx   -procedure discussed with patient; risks and benefits explained, questions answered  -consent obtained by attending  Risk Assessments:  ASA:3  Mallampati:3  Creat:   GFR:   BRA:  Pt assessed, appropriate for sedation and educated regarding plan for Versed/Fentanyl as needed    Indication: dyspnea/pulm htn evaluation of right heart pressures       Plan:    -plan for RHC  -preferred access: RIJ  -confirmed appropriate NPO duration  -ECG and Labs reviewed  -Hold Eliquis x 2 days   -Normal Saline 0.9%  250ml/hr IV: pre procedure LINDSAY ppx   -procedure discussed with patient; risks and benefits explained, questions answered  -consent obtained by attending  Risk Assessments:  ASA:3  Mallampati:3  Creat:   GFR:   BRA:  Pt assessed, appropriate for sedation and educated regarding plan for Versed/Fentanyl as needed    Indication: dyspnea/pulm htn evaluation of right heart pressures       Plan:    -plan for RHC  -preferred access: RIJ  -confirmed appropriate NPO duration  -ECG and Labs reviewed  -Hold Eliquis x 2 days (discussed with Dr. Feliciano no need to bridge with Lovenox)  -Normal Saline 0.9%  250ml/hr IV: pre procedure LINDSAY ppx   -procedure discussed with patient; risks and benefits explained, questions answered  -consent obtained by attending  Risk Assessments:  ASA:3  Mallampati:3  Creat: 1.24  GFR: 44  BRA: 2.6%  Pt assessed, appropriate for sedation and educated regarding plan for Versed/Fentanyl as needed    Indication: dyspnea/pulm htn evaluation of right heart pressures       Plan:    -plan for RHC  -preferred access: RIJ  -confirmed appropriate NPO duration  -ECG and Labs reviewed  -Hold Eliquis x 2 days (discussed with Dr. Feliciano no need to bridge with Lovenox)  -Normal Saline 0.9%  250ml/hr IV: pre procedure LINDSAY ppx   -procedure discussed with patient; risks and benefits explained, questions answered  -consent obtained by attending

## 2024-11-07 ENCOUNTER — TRANSCRIPTION ENCOUNTER (OUTPATIENT)
Age: 79
End: 2024-11-07

## 2024-11-07 ENCOUNTER — OUTPATIENT (OUTPATIENT)
Dept: OUTPATIENT SERVICES | Facility: HOSPITAL | Age: 79
LOS: 1 days | End: 2024-11-07
Payer: MEDICARE

## 2024-11-07 VITALS
SYSTOLIC BLOOD PRESSURE: 128 MMHG | RESPIRATION RATE: 16 BRPM | DIASTOLIC BLOOD PRESSURE: 53 MMHG | OXYGEN SATURATION: 100 % | HEART RATE: 60 BPM

## 2024-11-07 VITALS
RESPIRATION RATE: 16 BRPM | DIASTOLIC BLOOD PRESSURE: 66 MMHG | SYSTOLIC BLOOD PRESSURE: 136 MMHG | OXYGEN SATURATION: 100 % | HEART RATE: 63 BPM | TEMPERATURE: 98 F

## 2024-11-07 DIAGNOSIS — Z90.711 ACQUIRED ABSENCE OF UTERUS WITH REMAINING CERVICAL STUMP: Chronic | ICD-10-CM

## 2024-11-07 DIAGNOSIS — I27.20 PULMONARY HYPERTENSION, UNSPECIFIED: ICD-10-CM

## 2024-11-07 PROCEDURE — C1889: CPT

## 2024-11-07 PROCEDURE — 93451 RIGHT HEART CATH: CPT

## 2024-11-07 PROCEDURE — 93451 RIGHT HEART CATH: CPT | Mod: 26

## 2024-11-07 PROCEDURE — C1894: CPT

## 2024-11-07 RX ORDER — TORSEMIDE 100 MG/1
1 TABLET ORAL
Refills: 0 | DISCHARGE

## 2024-11-07 RX ORDER — SPIRONOLACTONE 100 MG
1 TABLET ORAL
Refills: 0 | DISCHARGE

## 2024-11-07 RX ORDER — APIXABAN 5 MG/1
1 TABLET, FILM COATED ORAL
Refills: 0 | DISCHARGE

## 2024-11-07 RX ORDER — CHLORHEXIDINE GLUCONATE 40 MG/ML
1 SOLUTION TOPICAL ONCE
Refills: 0 | Status: ACTIVE | OUTPATIENT
Start: 2024-11-07

## 2024-11-07 NOTE — DISCHARGE NOTE PROVIDER - CARE PROVIDERS DIRECT ADDRESSES
,[email or web address]

## 2024-11-07 NOTE — CHART NOTE - NSCHARTNOTEFT_GEN_A_CORE
Assessment: Presents today for RHC to be performed by Dr. Feliciano  PST done on 10/31/24, no interval change, reviewed labs and ECG   Indication: SOB    ROS: as stated above, otherwise negative    PHYSICAL EXAM:  Constitutional: A & O x 3, NAD  HEENT:  Normal oral mucosa, PERRL, EOMI	  Cardiovascular: S1 S2, + II/VI diastolic murmur, No JVD  Respiratory: Lungs clear to auscultation	  Gastrointestinal:  Soft, Non-tender, + BS	  Skin: No rashes or cyanosis  Neurologic: No deficit appreciated  Extremities: Normal range of motion, no LE edema  Vascular: distal pulses +     ASA/Mallampati/Bleeding Risk calculated and written in H&P PST on 10/31/24  Pt assessed, appropriate for sedation, pt educated regarding the plan for Versed/Fentanyl as needed.    Plan/Recommendations:   -plan for RHC  -preferred access: RBV vs. RIJ vs. RFV  -patient seen and examined  -confirmed appropriate NPO duration  -ECG and Labs reviewed  -procedure discussed with patient; risks and benefits explained, questions answered  -consent obtained by attending IC    Risks, benefits, and alternatives reviewed.  Risks including but not limited to MI, death, stroke, bleeding, infection, vessel injury, hematoma, renal failure, allergic reaction, urgent open heart surgery, restenosis and stent thrombosis were reviewed.  All questions answered.  Patient is agreeable to proceed. Assessment: Presents today for RHC to be performed by Dr. Feliciano  PST done on 10/31/24, no interval change, reviewed labs and ECG   Indication: SOB    ROS: as stated above, otherwise negative    PHYSICAL EXAM:    ASA/Mallampati/Bleeding Risk calculated and written in H&P PST on 10/31/24  Pt assessed, appropriate for sedation, pt educated regarding the plan for Versed/Fentanyl as needed.    Plan/Recommendations:   -plan for RHC  -preferred access: RBV vs. RIJ vs. RFV  -patient seen and examined  -confirmed appropriate NPO duration  -ECG and Labs reviewed  -procedure discussed with patient; risks and benefits explained, questions answered  -consent obtained by attending IC    Risks, benefits, and alternatives reviewed.  Risks including but not limited to MI, death, stroke, bleeding, infection, vessel injury, hematoma, renal failure, allergic reaction, urgent open heart surgery, restenosis and stent thrombosis were reviewed.  All questions answered.  Patient is agreeable to proceed. Assessment: Presents today for RHC to be performed by Dr. Feliciano  PST done on 10/31/24, no interval change, reviewed labs and ECG   Indication: SOB    ROS: as stated above, otherwise negative    PHYSICAL EXAM:  T(C): 36.7 (11-07-24 @ 08:11), Max: 36.7 (11-07-24 @ 08:11)  HR: 63 (11-07-24 @ 08:11) (63 - 63)  BP: 136/66 (11-07-24 @ 08:11) (136/66 - 136/66)  RR: 16 (11-07-24 @ 08:11) (16 - 16)  SpO2: 100% (11-07-24 @ 08:11) (100% - 100%)    CONSTITUTIONAL: Well groomed, no apparent distress  EYES: PERRLA and symmetric, EOMI, No conjunctival or scleral injection, non-icteric  ENMT: Oral mucosa with moist membranes. Normal dentition; no pharyngeal injection or exudates             NECK: Supple, symmetric and without tracheal deviation   RESP: No respiratory distress, no use of accessory muscles; CTA b/l, no WRR  CV: RRR, +S1S2, irregularly irregular, +systolic murmur  GI: Soft, NT, ND, no rebound, no guarding; no palpable masses; no hepatosplenomegaly; no hernia palpated  LYMPH: No cervical LAD or tenderness; no axillary LAD or tenderness; no inguinal LAD or tenderness  MSK: Normal gait; Normal ROM without pain, no spinal tenderness, normal muscle strength/tone  SKIN: No rashes or ulcers noted; no subcutaneous nodules or induration palpable  NEURO: CN II-XII intact;  sensation intact in upper and lower extremities b/l to light touch   PSYCH: Appropriate insight/judgment; A+O x 3, mood and affect appropriate, recent/remote memory intact    ASA/Mallampati/Bleeding Risk calculated and written in H&P PST on 10/31/24  Pt assessed, appropriate for sedation, pt educated regarding the plan for Versed/Fentanyl as needed.    Plan/Recommendations:   -plan for RHC  -preferred access: RBV vs. RIJ vs. RFV  -patient seen and examined  -confirmed appropriate NPO duration  -ECG and Labs reviewed  -Discussed with patient, last dose of eliquis was 11/4/24 (monday)  -procedure discussed with patient; risks and benefits explained, questions answered  -consent obtained by attending IC  - Veronica ID [id number] used for interpretation    Risks, benefits, and alternatives reviewed.  Risks including but not limited to MI, death, stroke, bleeding, infection, vessel injury, hematoma, renal failure, allergic reaction, urgent open heart surgery, restenosis and stent thrombosis were reviewed.  All questions answered.  Patient is agreeable to proceed.

## 2024-11-07 NOTE — DISCHARGE NOTE NURSING/CASE MANAGEMENT/SOCIAL WORK - NSDCPETBCESMAN_GEN_ALL_CORE
Last Filled 12/6/16  reviewed If you are a smoker, it is important for your health to stop smoking. Please be aware that second hand smoke is also harmful.

## 2024-11-07 NOTE — DISCHARGE NOTE PROVIDER - NSDCMRMEDTOKEN_GEN_ALL_CORE_FT
Aldactone 50 mg oral tablet: 1 tab(s) orally once a day  Eliquis 5 mg oral tablet: 1 tab(s) orally 2 times a day  metoprolol succinate 50 mg oral capsule, extended release: 1 cap(s) orally once a day  torsemide 20 mg oral tablet: 1 tab(s) orally once a day

## 2024-11-07 NOTE — DISCHARGE NOTE PROVIDER - CARE PROVIDER_API CALL
Gage Portillo)  Cardiovascular Disease  [address] [place] [address]  Phone: [phone]  Fax: [phone]  Established Patient  Follow Up Time: 2 weeks    Franky Feliciano  Pulmonary Disease  [address] [facility], [address]  [place] [address]  Phone: [phone]  Fax: [phone]  Established Patient  Follow Up Time: 1 week

## 2024-11-07 NOTE — CHART NOTE - NSCHARTNOTEFT_GEN_A_CORE
Now s/p RHC via RIJ with Dr. Feliciano, pt tolerated procedure well. Pt arrived to recovery in NAD and HDS, RIJ access site stable, no bleed/hematoma, patient denies right neck pain, no crepitus.   Intraprocedural findings: Post RHC Findings:  PCW: 26  PA: 49/22/33  RV: 47/4/16  RA: 19/21/16    Medications:   Fentanyl: 25mcg IV  Versed: 1mg IV  Lidocaine 1% 10ml  Omnipaque: 0ml    Plan:  -Formal cath report pending  -Post procedure management/monitoring per protocol  -Access site precautions  -Bedrest x 1 hours post procedure  -Labs and EKG in am  -Post procedure fluids held= wedge 26, no contrast given  -Repeat ECG if any clinical indication or change on tele  -Continue current medical therapy  -Ok to resume Eliquis tonight  -Educated regarding post procedure management and care  -Discussed the importance of RF modification  -F/U outpt in 1-2 weeks with Cardiologist Dr. Gage Portillo and with Dr. Feliciano  -DISPO:  Plan for D/C home tonight if patient remains HDS and RIJ site remains stable

## 2024-11-07 NOTE — DISCHARGE NOTE PROVIDER - NSDCCPCAREPLAN_GEN_ALL_CORE_FT
PRINCIPAL DISCHARGE DIAGNOSIS  Diagnosis: Pulmonary hypertension  Assessment and Plan of Treatment: -A type of high blood pressure that affects arteries in the lungs and in the heart.  -Symptoms may include shortness of breath, dizziness and chest pressure.  -Please return to nearest ER if you develop worsened shortness of breath, chest pressure, palpitations, dizziness, swelling in the legs or passing out.  -Please take all of your medications as previously prescribed.  -Please follow up with your cardiologist Dr. Portillo and Dr. Feliciano.   -you may resume your Eliquis tonight.

## 2024-11-07 NOTE — DISCHARGE NOTE PROVIDER - PROVIDER TOKENS
PROVIDER:[TOKEN:[[id number]],FOLLOWUP:[2 weeks],ESTABLISHEDPATIENT:[T]],PROVIDER:[TOKEN:[[id number]],FOLLOWUP:[1 week],ESTABLISHEDPATIENT:[T]]

## 2024-11-07 NOTE — DISCHARGE NOTE PROVIDER - NSDCFUSCHEDAPPT_GEN_ALL_CORE_FT
Gage Portillo  [facility] Physician [facility]  CARDIOLOGY [address]  Scheduled Appointment: 12/11/2024    Anthony Adair  [facility]  PULMMED [address]  Scheduled Appointment: 01/02/2025

## 2024-11-07 NOTE — DISCHARGE NOTE PROVIDER - NSDCCPTREATMENT_GEN_ALL_CORE_FT
PRINCIPAL PROCEDURE  Procedure: Right heart cardiac catheterization  Findings and Treatment: -You had a right heart cardiac catheterization with Dr. Feliciano today on 11/8/22. Dr. Feliciano accessed your right internal jugular vein during the procedure. That is the vein in your right neck.   -Please continue to monitor your right neck when you go home. If you develop any bleeding, lay down where you are and apply direct firm pressure until the bleeding stops. If the bleeding is excessive, please call 911.   -If heavy bleeding or large lumps form, hold pressure at the spot and come to the Emergency Room.    You may start showering tomorrow on 11/8. Prior to showering, remove dressing from right neck. Shower with warm water and soap. Pat dry with towel. You may place a bandaid over the site. change the bandaid every day.   -You may walk indoors/ outdoors as tolerated. No strenuous exercise, gym, sports or heavy lifting x5 days.  -Please return to nearest ED if you develop any fever, chills, drainage from the incision site, or any crunching sensation in the neck.  -Please return to nearest ED if you develop any chest pain, chest pressure, shortness of breath, jaw pain, pain radiating down the arm, palpitations, dizziness, palpitations, abdominal complaints including abdominal pain, nausea and vomiting.   -Please follow up with your cardiologist in 1-2 weeks

## 2024-11-07 NOTE — DISCHARGE NOTE NURSING/CASE MANAGEMENT/SOCIAL WORK - NSDCPEFALRISK_GEN_ALL_CORE
For information on Fall & Injury Prevention, visit: [email or web address] OR  [email or web address] OR  https://www.cdc.gov/steadi/patient.html

## 2024-11-07 NOTE — DISCHARGE NOTE NURSING/CASE MANAGEMENT/SOCIAL WORK - FINANCIAL ASSISTANCE
[facility] provides services at a reduced cost to those who are determined to be eligible through [facility]’s financial assistance program. Information regarding [facility]’s financial assistance program can be found by going to [email or web address] or by calling [phone].

## 2024-11-07 NOTE — DISCHARGE NOTE PROVIDER - HOSPITAL COURSE
HPI: This is an 79 year old female with PMH of AFib(on Eliquis), GOYO on CPAP, COPD, and Pulmonary Hypertensionwith an estimated PASP 38mmHg (mild) She reports shortness of breath worse with exertion. Now presents to PST for upcoming RHC with Dr. Feliciano on 11/7/2024    Summary of testing:   CPAP compliance data 7/1/2024-8/8/2024: Used 23/39 days with an average usage time of 1.5 hours, resultant AHI 3.3 when on the device.  ?  Spirometry 7/7/2024: FEV1 1.44/81%, FVC 1.97/84%, ratio 73.  ?  Echo 5/9/2024: Left ventricular cavity is normal in size. Left ventricular systolic function is normal with an ejection fraction of 65 % by Poon's method of disks with an ejection fraction visually estimated at 60 to 65 %. Normal right ventricular cavity size and normal systolic function. The left atrium is mildly dilated. The right atrium is mildly dilated. Mild to moderate mitral regurgitation. Moderate tricuspid regurgitation. Estimated pulmonary artery systolic pressure is 38 mmHg, consistent with mild pulmonary hypertension. No pericardial effusion seen.  ?  CT Chest 3/1/2023: Mild changes of COPD/centrilobular emphysema. Stable nodules in the lung fields. Demineralization of the bones.      Now s/p RHC via RIJ with Dr. Feliciano, pt tolerated procedure well. Pt arrived to recovery in NAD and HDS, RIJ access site stable, no bleed/hematoma, patient denies right neck pain, no crepitus.   Intraprocedural findings: Post RHC Findings:  PCW: 26  PA: 49/22/33  RV: 47/4/16  RA: 19/21/16    Medications:   Fentanyl: 25mcg IV  Versed: 1mg IV  Lidocaine 1% 10ml  Omnipaque: 0ml    Plan:  -Formal cath report pending  -Post procedure management/monitoring per protocol  -Access site precautions  -Bedrest x 1 hours post procedure  -Labs and EKG in am  -Post procedure fluids held= wedge 26, no contrast given  -Repeat ECG if any clinical indication or change on tele  -Continue current medical therapy  -Ok to resume Eliquis tonight  -Educated regarding post procedure management and care  -Discussed the importance of RF modification  -F/U outpt in 1-2 weeks with Cardiologist Dr. Gage Portillo and with Dr. Feliciano  -DISPO:  Plan for D/C home tonight if patient remains HDS and RIJ site remains stable.

## 2024-11-07 NOTE — ASU PATIENT PROFILE, ADULT - VISION (WITH CORRECTIVE LENSES IF THE PATIENT USUALLY WEARS THEM):
PT IS AAOX4, PT IS RESTING IN BED, NAD. PT IS BEING ADMITTED, PT WAS TOLD OF POC. PT WAS REMINDED THAT URINE WAS NEEDED.    Normal vision: sees adequately in most situations; can see medication labels, newsprint

## 2024-11-07 NOTE — DISCHARGE NOTE NURSING/CASE MANAGEMENT/SOCIAL WORK - PATIENT PORTAL LINK FT
You can access the FollowMyHealth Patient Portal offered by [facility] by registering at the following website: [email or web address]. By joining [employer]’s FollowMyHealth portal, you will also be able to view your health information using other applications (apps) compatible with our system.

## 2024-11-21 ENCOUNTER — APPOINTMENT (OUTPATIENT)
Dept: PULMONOLOGY | Facility: CLINIC | Age: 79
End: 2024-11-21
Payer: MEDICARE

## 2024-11-21 VITALS
OXYGEN SATURATION: 99 % | SYSTOLIC BLOOD PRESSURE: 124 MMHG | DIASTOLIC BLOOD PRESSURE: 66 MMHG | RESPIRATION RATE: 16 BRPM | HEART RATE: 70 BPM | BODY MASS INDEX: 30.04 KG/M2 | HEIGHT: 60 IN | WEIGHT: 153 LBS

## 2024-11-21 PROBLEM — I27.20 PULMONARY HYPERTENSION, UNSPECIFIED: Chronic | Status: ACTIVE | Noted: 2024-10-31

## 2024-11-21 PROCEDURE — G2211 COMPLEX E/M VISIT ADD ON: CPT

## 2024-11-21 PROCEDURE — 99214 OFFICE O/P EST MOD 30 MIN: CPT

## 2024-11-21 RX ORDER — TORSEMIDE 20 MG/1
20 TABLET ORAL
Qty: 45 | Refills: 3 | Status: ACTIVE | COMMUNITY
Start: 2024-11-21 | End: 1900-01-01

## 2024-12-03 ENCOUNTER — RX RENEWAL (OUTPATIENT)
Age: 79
End: 2024-12-03

## 2024-12-11 ENCOUNTER — APPOINTMENT (OUTPATIENT)
Dept: CARDIOLOGY | Facility: CLINIC | Age: 79
End: 2024-12-11
Payer: MEDICARE

## 2024-12-11 VITALS
HEART RATE: 73 BPM | WEIGHT: 158 LBS | OXYGEN SATURATION: 97 % | BODY MASS INDEX: 31.02 KG/M2 | SYSTOLIC BLOOD PRESSURE: 147 MMHG | HEIGHT: 60 IN | RESPIRATION RATE: 16 BRPM | DIASTOLIC BLOOD PRESSURE: 78 MMHG

## 2024-12-11 DIAGNOSIS — I27.21 SECONDARY PULMONARY ARTERIAL HYPERTENSION: ICD-10-CM

## 2024-12-11 DIAGNOSIS — R06.09 OTHER FORMS OF DYSPNEA: ICD-10-CM

## 2024-12-11 DIAGNOSIS — G47.33 OBSTRUCTIVE SLEEP APNEA (ADULT) (PEDIATRIC): ICD-10-CM

## 2024-12-11 DIAGNOSIS — R60.0 LOCALIZED EDEMA: ICD-10-CM

## 2024-12-11 DIAGNOSIS — I48.20 CHRONIC ATRIAL FIBRILLATION, UNSP: ICD-10-CM

## 2024-12-11 PROCEDURE — 99214 OFFICE O/P EST MOD 30 MIN: CPT

## 2024-12-11 PROCEDURE — G2211 COMPLEX E/M VISIT ADD ON: CPT

## 2024-12-11 PROCEDURE — 93000 ELECTROCARDIOGRAM COMPLETE: CPT

## 2024-12-16 NOTE — DISCHARGE NOTE PROVIDER - NSDCCAREPROVSEEN_GEN_ALL_CORE_FT
[facility] provides services at a reduced cost to those who are determined to be eligible through [facility]’s financial assistance program. Information regarding [facility]’s financial assistance program can be found by going to [email or web address] or by calling [phone]. Khadijah Urbina

## 2024-12-25 ENCOUNTER — NON-APPOINTMENT (OUTPATIENT)
Age: 79
End: 2024-12-25

## 2025-01-29 ENCOUNTER — APPOINTMENT (OUTPATIENT)
Dept: PULMONOLOGY | Facility: CLINIC | Age: 80
End: 2025-01-29
Payer: MEDICARE

## 2025-01-29 VITALS
SYSTOLIC BLOOD PRESSURE: 118 MMHG | BODY MASS INDEX: 29.07 KG/M2 | RESPIRATION RATE: 16 BRPM | HEART RATE: 71 BPM | DIASTOLIC BLOOD PRESSURE: 80 MMHG | WEIGHT: 154 LBS | HEIGHT: 61 IN | OXYGEN SATURATION: 99 %

## 2025-01-29 DIAGNOSIS — G47.33 OBSTRUCTIVE SLEEP APNEA (ADULT) (PEDIATRIC): ICD-10-CM

## 2025-01-29 PROCEDURE — 99214 OFFICE O/P EST MOD 30 MIN: CPT

## 2025-01-29 PROCEDURE — G2211 COMPLEX E/M VISIT ADD ON: CPT

## 2025-02-03 ENCOUNTER — OFFICE (OUTPATIENT)
Dept: URBAN - METROPOLITAN AREA CLINIC 115 | Facility: CLINIC | Age: 80
Setting detail: OPHTHALMOLOGY
End: 2025-02-03
Payer: MEDICAID

## 2025-02-03 DIAGNOSIS — H16.223: ICD-10-CM

## 2025-02-03 DIAGNOSIS — H26.492: ICD-10-CM

## 2025-02-03 DIAGNOSIS — Z96.1: ICD-10-CM

## 2025-02-03 DIAGNOSIS — H53.433: ICD-10-CM

## 2025-02-03 DIAGNOSIS — H02.031: ICD-10-CM

## 2025-02-03 DIAGNOSIS — H02.034: ICD-10-CM

## 2025-02-03 DIAGNOSIS — H40.1111: ICD-10-CM

## 2025-02-03 DIAGNOSIS — H15.101: ICD-10-CM

## 2025-02-03 PROCEDURE — 99213 OFFICE O/P EST LOW 20 MIN: CPT | Performed by: OPHTHALMOLOGY

## 2025-02-03 ASSESSMENT — REFRACTION_CURRENTRX
OD_OVR_VA: 20/
OS_SPHERE: +0.25
OS_ADD: +1.50
OD_ADD: +2.25
OS_OVR_VA: 20/
OD_CYLINDER: -0.50
OD_SPHERE: +0.75
OD_OVR_VA: 20/
OD_ADD: +2.75
OS_ADD: +2.25
OS_CYLINDER: -2.00
OS_VPRISM_DIRECTION: PROGS
OS_AXIS: 072
OD_AXIS: 077
OD_CYLINDER: -2.50
OS_VPRISM_DIRECTION: PROGS
OS_SPHERE: +0.25
OD_SPHERE: +0.75
OD_CYLINDER: -3.00
OS_CYLINDER: -0.75
OD_SPHERE: +3.00
OS_VPRISM_DIRECTION: PROGS
OS_CYLINDER: -1.00
OD_OVR_VA: 20/
OD_VPRISM_DIRECTION: PROGS
OD_CYLINDER: -0.50
OD_AXIS: 066
OS_AXIS: 066
OD_VPRISM_DIRECTION: PROGS
OD_AXIS: 071
OS_AXIS: 067
OD_VPRISM_DIRECTION: PROGS
OS_ADD: +2.00
OD_ADD: +2.00
OD_ADD: +2.75
OS_SPHERE: +0.25
OS_ADD: +3.00
OS_CYLINDER: -0.75
OS_ADD: +2.75
OS_AXIS: 090
OD_CYLINDER: -0.50
OS_SPHERE: +3.75
OS_OVR_VA: 20/
OD_AXIS: 075
OS_AXIS: 093
OS_SPHERE: +3.75
OD_SPHERE: +0.50
OD_ADD: +1.50
OS_OVR_VA: 20/
OD_SPHERE: +3.50
OD_AXIS: 082
OS_CYLINDER: -2.00

## 2025-02-03 ASSESSMENT — KERATOMETRY
OD_AXISANGLE_DEGREES: 144
OS_AXISANGLE_DEGREES: 159
OS_K1POWER_DIOPTERS: 42.75
OS_K2POWER_DIOPTERS: 43.75
OD_K1POWER_DIOPTERS: 42.75
METHOD_AUTO_MANUAL: AUTO
OD_K2POWER_DIOPTERS: 44.00

## 2025-02-03 ASSESSMENT — LID POSITION - ECTROPION: OD_ECTROPION: RLL 2+

## 2025-02-03 ASSESSMENT — VISUAL ACUITY
OD_BCVA: 20/25
OS_BCVA: 20/25-1

## 2025-02-03 ASSESSMENT — REFRACTION_MANIFEST
OD_SPHERE: +0.50
OS_SPHERE: +0.25
OS_CYLINDER: -0.75
OD_CYLINDER: -0.75
OD_AXIS: 65
OS_AXIS: 90
OS_VA1: 20/25
OD_ADD: +2.75
OD_VA1: 20/25
OS_ADD: +2.75

## 2025-02-03 ASSESSMENT — REFRACTION_AUTOREFRACTION
OD_CYLINDER: -1.75
OS_SPHERE: +1.00
OS_CYLINDER: -1.50
OD_AXIS: 073
OS_AXIS: 087
OD_SPHERE: +1.50

## 2025-02-03 ASSESSMENT — LID POSITION - ENTROPION
OS_ENTROPION: LUL 2+
OD_ENTROPION: RUL 2+

## 2025-02-03 ASSESSMENT — TONOMETRY
OS_IOP_MMHG: 15
OD_IOP_MMHG: 15

## 2025-02-03 ASSESSMENT — CONFRONTATIONAL VISUAL FIELD TEST (CVF)
OS_FINDINGS: FULL
OD_FINDINGS: FULL

## 2025-02-03 ASSESSMENT — PACHYMETRY
OD_CT_CORRECTION: 1
OD_CT_UM: 523
OS_CT_CORRECTION: -1
OS_CT_UM: 555

## 2025-02-03 ASSESSMENT — LID EXAM ASSESSMENTS: OD_TRICHIASIS: RUL 2+

## 2025-02-12 ENCOUNTER — NON-APPOINTMENT (OUTPATIENT)
Age: 80
End: 2025-02-12

## 2025-02-20 ENCOUNTER — APPOINTMENT (OUTPATIENT)
Dept: PULMONOLOGY | Facility: CLINIC | Age: 80
End: 2025-02-20
Payer: MEDICARE

## 2025-02-20 ENCOUNTER — APPOINTMENT (OUTPATIENT)
Dept: PULMONOLOGY | Facility: CLINIC | Age: 80
End: 2025-02-20

## 2025-02-20 VITALS
HEIGHT: 61 IN | OXYGEN SATURATION: 98 % | WEIGHT: 150 LBS | DIASTOLIC BLOOD PRESSURE: 68 MMHG | RESPIRATION RATE: 16 BRPM | SYSTOLIC BLOOD PRESSURE: 120 MMHG | BODY MASS INDEX: 28.32 KG/M2 | HEART RATE: 72 BPM

## 2025-02-20 DIAGNOSIS — Z71.89 OTHER SPECIFIED COUNSELING: ICD-10-CM

## 2025-02-20 DIAGNOSIS — G47.33 OBSTRUCTIVE SLEEP APNEA (ADULT) (PEDIATRIC): ICD-10-CM

## 2025-02-20 PROCEDURE — 99214 OFFICE O/P EST MOD 30 MIN: CPT

## 2025-02-20 PROCEDURE — G2211 COMPLEX E/M VISIT ADD ON: CPT

## 2025-02-21 ENCOUNTER — APPOINTMENT (OUTPATIENT)
Dept: PULMONOLOGY | Facility: CLINIC | Age: 80
End: 2025-02-21

## 2025-03-11 ENCOUNTER — NON-APPOINTMENT (OUTPATIENT)
Age: 80
End: 2025-03-11

## 2025-03-13 ENCOUNTER — APPOINTMENT (OUTPATIENT)
Dept: CARDIOLOGY | Facility: CLINIC | Age: 80
End: 2025-03-13
Payer: MEDICARE

## 2025-03-13 VITALS
SYSTOLIC BLOOD PRESSURE: 110 MMHG | HEART RATE: 70 BPM | HEIGHT: 61 IN | WEIGHT: 156 LBS | OXYGEN SATURATION: 98 % | RESPIRATION RATE: 16 BRPM | DIASTOLIC BLOOD PRESSURE: 70 MMHG | BODY MASS INDEX: 29.45 KG/M2

## 2025-03-13 DIAGNOSIS — I48.20 CHRONIC ATRIAL FIBRILLATION, UNSP: ICD-10-CM

## 2025-03-13 DIAGNOSIS — R60.0 LOCALIZED EDEMA: ICD-10-CM

## 2025-03-13 DIAGNOSIS — I27.21 SECONDARY PULMONARY ARTERIAL HYPERTENSION: ICD-10-CM

## 2025-03-13 DIAGNOSIS — Z71.89 OTHER SPECIFIED COUNSELING: ICD-10-CM

## 2025-03-13 DIAGNOSIS — G47.33 OBSTRUCTIVE SLEEP APNEA (ADULT) (PEDIATRIC): ICD-10-CM

## 2025-03-13 DIAGNOSIS — I77.9 DISORDER OF ARTERIES AND ARTERIOLES, UNSPECIFIED: ICD-10-CM

## 2025-03-13 PROCEDURE — 99214 OFFICE O/P EST MOD 30 MIN: CPT

## 2025-03-13 PROCEDURE — 93000 ELECTROCARDIOGRAM COMPLETE: CPT

## 2025-03-13 PROCEDURE — G2211 COMPLEX E/M VISIT ADD ON: CPT

## 2025-03-31 ENCOUNTER — APPOINTMENT (OUTPATIENT)
Dept: PULMONOLOGY | Facility: CLINIC | Age: 80
End: 2025-03-31
Payer: MEDICARE

## 2025-03-31 VITALS
WEIGHT: 147 LBS | HEART RATE: 59 BPM | DIASTOLIC BLOOD PRESSURE: 60 MMHG | SYSTOLIC BLOOD PRESSURE: 108 MMHG | RESPIRATION RATE: 16 BRPM | OXYGEN SATURATION: 98 % | BODY MASS INDEX: 27.75 KG/M2 | HEIGHT: 61 IN

## 2025-03-31 DIAGNOSIS — I27.21 SECONDARY PULMONARY ARTERIAL HYPERTENSION: ICD-10-CM

## 2025-03-31 DIAGNOSIS — J43.2 CENTRILOBULAR EMPHYSEMA: ICD-10-CM

## 2025-03-31 PROCEDURE — 99214 OFFICE O/P EST MOD 30 MIN: CPT

## 2025-03-31 PROCEDURE — G2211 COMPLEX E/M VISIT ADD ON: CPT

## 2025-04-28 NOTE — ED PROVIDER NOTE - WR ORDER STATUS 1
Writer called patient and LVM to call office to make f/u appt.  Transfer call to resident MR to make appt.    Performed

## 2025-05-15 ENCOUNTER — NON-APPOINTMENT (OUTPATIENT)
Age: 80
End: 2025-05-15

## 2025-05-16 ENCOUNTER — NON-APPOINTMENT (OUTPATIENT)
Age: 80
End: 2025-05-16

## 2025-05-23 ENCOUNTER — APPOINTMENT (OUTPATIENT)
Dept: PULMONOLOGY | Facility: CLINIC | Age: 80
End: 2025-05-23
Payer: MEDICARE

## 2025-05-23 VITALS
RESPIRATION RATE: 16 BRPM | HEART RATE: 58 BPM | OXYGEN SATURATION: 100 % | BODY MASS INDEX: 28.13 KG/M2 | WEIGHT: 149 LBS | HEIGHT: 61 IN | DIASTOLIC BLOOD PRESSURE: 60 MMHG | SYSTOLIC BLOOD PRESSURE: 112 MMHG

## 2025-05-23 DIAGNOSIS — G47.33 OBSTRUCTIVE SLEEP APNEA (ADULT) (PEDIATRIC): ICD-10-CM

## 2025-05-23 PROCEDURE — G2211 COMPLEX E/M VISIT ADD ON: CPT

## 2025-05-23 PROCEDURE — 99214 OFFICE O/P EST MOD 30 MIN: CPT

## 2025-06-05 ENCOUNTER — APPOINTMENT (OUTPATIENT)
Dept: PULMONOLOGY | Facility: CLINIC | Age: 80
End: 2025-06-05
Payer: MEDICARE

## 2025-06-05 VITALS
RESPIRATION RATE: 16 BRPM | DIASTOLIC BLOOD PRESSURE: 64 MMHG | HEART RATE: 53 BPM | SYSTOLIC BLOOD PRESSURE: 100 MMHG | OXYGEN SATURATION: 97 %

## 2025-06-05 DIAGNOSIS — G47.33 OBSTRUCTIVE SLEEP APNEA (ADULT) (PEDIATRIC): ICD-10-CM

## 2025-06-05 DIAGNOSIS — Z71.89 OTHER SPECIFIED COUNSELING: ICD-10-CM

## 2025-06-05 PROCEDURE — G2211 COMPLEX E/M VISIT ADD ON: CPT

## 2025-06-05 PROCEDURE — 99213 OFFICE O/P EST LOW 20 MIN: CPT

## 2025-06-09 ENCOUNTER — OFFICE (OUTPATIENT)
Dept: URBAN - METROPOLITAN AREA CLINIC 115 | Facility: CLINIC | Age: 80
Setting detail: OPHTHALMOLOGY
End: 2025-06-09
Payer: MEDICAID

## 2025-06-09 DIAGNOSIS — Z96.1: ICD-10-CM

## 2025-06-09 DIAGNOSIS — H15.101: ICD-10-CM

## 2025-06-09 DIAGNOSIS — H40.1111: ICD-10-CM

## 2025-06-09 DIAGNOSIS — H52.4: ICD-10-CM

## 2025-06-09 DIAGNOSIS — H53.433: ICD-10-CM

## 2025-06-09 DIAGNOSIS — H02.034: ICD-10-CM

## 2025-06-09 DIAGNOSIS — H02.031: ICD-10-CM

## 2025-06-09 DIAGNOSIS — H16.223: ICD-10-CM

## 2025-06-09 DIAGNOSIS — H26.492: ICD-10-CM

## 2025-06-09 PROCEDURE — 92083 EXTENDED VISUAL FIELD XM: CPT | Performed by: OPHTHALMOLOGY

## 2025-06-09 PROCEDURE — 92133 CPTRZD OPH DX IMG PST SGM ON: CPT | Performed by: OPHTHALMOLOGY

## 2025-06-09 PROCEDURE — 92015 DETERMINE REFRACTIVE STATE: CPT | Performed by: OPHTHALMOLOGY

## 2025-06-09 PROCEDURE — 99213 OFFICE O/P EST LOW 20 MIN: CPT | Performed by: OPHTHALMOLOGY

## 2025-06-09 ASSESSMENT — REFRACTION_CURRENTRX
OS_ADD: +2.75
OS_CYLINDER: -0.75
OS_VPRISM_DIRECTION: PROGS
OS_SPHERE: +0.25
OD_OVR_VA: 20/
OS_VPRISM_DIRECTION: PROGS
OD_ADD: +2.00
OS_OVR_VA: 20/
OD_ADD: +1.50
OS_AXIS: 067
OD_AXIS: 082
OS_AXIS: 093
OS_ADD: +1.50
OD_CYLINDER: -0.50
OD_AXIS: 066
OS_VPRISM_DIRECTION: PROGS
OD_AXIS: 077
OD_ADD: +2.75
OD_SPHERE: +3.50
OD_CYLINDER: -3.00
OD_VPRISM_DIRECTION: PROGS
OS_CYLINDER: -2.00
OD_CYLINDER: -0.50
OS_ADD: +3.00
OS_AXIS: 072
OD_VPRISM_DIRECTION: PROGS
OD_SPHERE: +0.75
OS_SPHERE: +0.25
OD_OVR_VA: 20/
OS_CYLINDER: -2.00
OD_CYLINDER: -0.50
OD_OVR_VA: 20/
OD_AXIS: 075
OD_ADD: +2.75
OD_AXIS: 071
OD_CYLINDER: -2.50
OS_ADD: +2.25
OS_CYLINDER: -0.75
OS_OVR_VA: 20/
OS_AXIS: 066
OS_OVR_VA: 20/
OS_CYLINDER: -1.00
OS_AXIS: 090
OD_VPRISM_DIRECTION: PROGS
OS_SPHERE: +0.25
OD_SPHERE: +0.50
OS_SPHERE: +3.75
OD_SPHERE: +3.00
OD_SPHERE: +0.75
OD_ADD: +2.25
OS_ADD: +2.00
OS_SPHERE: +3.75

## 2025-06-09 ASSESSMENT — REFRACTION_MANIFEST
OD_VA1: 20/25
OD_AXIS: 65
OS_ADD: +2.75
OD_SPHERE: +0.50
OS_CYLINDER: -0.75
OS_AXIS: 90
OS_SPHERE: +0.25
OD_ADD: +2.75
OS_VA1: 20/25
OD_CYLINDER: -0.75

## 2025-06-09 ASSESSMENT — LID EXAM ASSESSMENTS: OD_TRICHIASIS: RUL 2+

## 2025-06-09 ASSESSMENT — LID POSITION - ENTROPION
OD_ENTROPION: RUL 2+
OS_ENTROPION: LUL 2+

## 2025-06-09 ASSESSMENT — VISUAL ACUITY
OD_BCVA: 20/25
OS_BCVA: 20/30-

## 2025-06-09 ASSESSMENT — KERATOMETRY
OS_K1POWER_DIOPTERS: 42.75
OS_K2POWER_DIOPTERS: 43.75
OD_AXISANGLE_DEGREES: 144
OD_K1POWER_DIOPTERS: 42.75
OS_AXISANGLE_DEGREES: 159
OD_K2POWER_DIOPTERS: 44.00
METHOD_AUTO_MANUAL: AUTO

## 2025-06-09 ASSESSMENT — PACHYMETRY
OD_CT_UM: 523
OS_CT_UM: 555
OS_CT_CORRECTION: -1
OD_CT_CORRECTION: 1

## 2025-06-09 ASSESSMENT — CONFRONTATIONAL VISUAL FIELD TEST (CVF)
OD_FINDINGS: FULL
OS_FINDINGS: FULL

## 2025-06-09 ASSESSMENT — REFRACTION_AUTOREFRACTION
OS_SPHERE: +0.75
OD_SPHERE: +1.25
OS_CYLINDER: -1.25
OD_AXIS: 057
OD_CYLINDER: -1.75
OS_AXIS: 091

## 2025-06-09 ASSESSMENT — TONOMETRY
OS_IOP_MMHG: 12
OS_IOP_MMHG: 14
OD_IOP_MMHG: 13
OD_IOP_MMHG: 11

## 2025-06-09 ASSESSMENT — LID POSITION - ECTROPION: OD_ECTROPION: RLL 2+

## 2025-06-10 ENCOUNTER — NON-APPOINTMENT (OUTPATIENT)
Age: 80
End: 2025-06-10

## 2025-06-11 ENCOUNTER — APPOINTMENT (OUTPATIENT)
Dept: CARDIOLOGY | Facility: CLINIC | Age: 80
End: 2025-06-11

## 2025-06-11 PROCEDURE — 93306 TTE W/DOPPLER COMPLETE: CPT

## 2025-07-14 ENCOUNTER — APPOINTMENT (OUTPATIENT)
Dept: PULMONOLOGY | Facility: CLINIC | Age: 80
End: 2025-07-14

## 2025-07-24 ENCOUNTER — APPOINTMENT (OUTPATIENT)
Dept: CARDIOLOGY | Facility: CLINIC | Age: 80
End: 2025-07-24
Payer: MEDICARE

## 2025-07-24 VITALS
BODY MASS INDEX: 30.21 KG/M2 | SYSTOLIC BLOOD PRESSURE: 122 MMHG | OXYGEN SATURATION: 97 % | WEIGHT: 160 LBS | RESPIRATION RATE: 16 BRPM | HEIGHT: 61 IN | DIASTOLIC BLOOD PRESSURE: 80 MMHG | HEART RATE: 65 BPM

## 2025-07-24 DIAGNOSIS — I27.21 SECONDARY PULMONARY ARTERIAL HYPERTENSION: ICD-10-CM

## 2025-07-24 DIAGNOSIS — R06.09 OTHER FORMS OF DYSPNEA: ICD-10-CM

## 2025-07-24 DIAGNOSIS — I48.20 CHRONIC ATRIAL FIBRILLATION, UNSP: ICD-10-CM

## 2025-07-24 DIAGNOSIS — I77.9 DISORDER OF ARTERIES AND ARTERIOLES, UNSPECIFIED: ICD-10-CM

## 2025-07-24 DIAGNOSIS — R60.0 LOCALIZED EDEMA: ICD-10-CM

## 2025-07-24 DIAGNOSIS — Z71.89 OTHER SPECIFIED COUNSELING: ICD-10-CM

## 2025-07-24 DIAGNOSIS — G47.33 OBSTRUCTIVE SLEEP APNEA (ADULT) (PEDIATRIC): ICD-10-CM

## 2025-07-24 DIAGNOSIS — I27.20 PULMONARY HYPERTENSION, UNSPECIFIED: ICD-10-CM

## 2025-07-24 DIAGNOSIS — I36.1 NONRHEUMATIC TRICUSPID (VALVE) INSUFFICIENCY: ICD-10-CM

## 2025-07-24 PROCEDURE — 99214 OFFICE O/P EST MOD 30 MIN: CPT

## 2025-07-24 PROCEDURE — G2211 COMPLEX E/M VISIT ADD ON: CPT

## 2025-07-24 PROCEDURE — 93000 ELECTROCARDIOGRAM COMPLETE: CPT

## 2025-07-24 RX ORDER — ALENDRONATE SODIUM 70 MG/75ML
70 SOLUTION ORAL
Refills: 0 | Status: ACTIVE | COMMUNITY

## 2025-07-24 RX ORDER — MAGNESIUM OXIDE/MAG AA CHELATE 300 MG
CAPSULE ORAL
Refills: 0 | Status: ACTIVE | COMMUNITY

## 2025-08-08 ENCOUNTER — APPOINTMENT (OUTPATIENT)
Dept: NEUROLOGY | Facility: CLINIC | Age: 80
End: 2025-08-08
Payer: MEDICARE

## 2025-08-08 VITALS
DIASTOLIC BLOOD PRESSURE: 58 MMHG | HEIGHT: 61 IN | HEART RATE: 90 BPM | WEIGHT: 160 LBS | SYSTOLIC BLOOD PRESSURE: 109 MMHG | BODY MASS INDEX: 30.21 KG/M2

## 2025-08-08 DIAGNOSIS — M54.12 RADICULOPATHY, CERVICAL REGION: ICD-10-CM

## 2025-08-08 PROCEDURE — 99204 OFFICE O/P NEW MOD 45 MIN: CPT

## 2025-08-14 ENCOUNTER — APPOINTMENT (OUTPATIENT)
Dept: NEUROSURGERY | Facility: CLINIC | Age: 80
End: 2025-08-14
Payer: MEDICARE

## 2025-08-14 VITALS
BODY MASS INDEX: 29.27 KG/M2 | SYSTOLIC BLOOD PRESSURE: 120 MMHG | TEMPERATURE: 97.3 F | OXYGEN SATURATION: 97 % | DIASTOLIC BLOOD PRESSURE: 72 MMHG | WEIGHT: 155 LBS | HEIGHT: 61 IN | HEART RATE: 60 BPM

## 2025-08-14 DIAGNOSIS — S32.050A WEDGE COMPRESSION FRACTURE OF FIFTH LUMBAR VERTEBRA, INITIAL ENCOUNTER FOR CLOSED FRACTURE: ICD-10-CM

## 2025-08-14 DIAGNOSIS — S22.080A WEDGE COMPRESSION FRACTURE OF T11-T12 VERTEBRA, INITIAL ENCOUNTER FOR CLOSED FRACTURE: ICD-10-CM

## 2025-08-14 PROCEDURE — 99204 OFFICE O/P NEW MOD 45 MIN: CPT

## 2025-08-20 ENCOUNTER — APPOINTMENT (OUTPATIENT)
Dept: PULMONOLOGY | Facility: CLINIC | Age: 80
End: 2025-08-20
Payer: MEDICARE

## 2025-08-20 VITALS
BODY MASS INDEX: 29.45 KG/M2 | RESPIRATION RATE: 16 BRPM | HEIGHT: 61 IN | WEIGHT: 156 LBS | DIASTOLIC BLOOD PRESSURE: 64 MMHG | OXYGEN SATURATION: 98 % | HEART RATE: 62 BPM | SYSTOLIC BLOOD PRESSURE: 122 MMHG

## 2025-08-20 DIAGNOSIS — Z71.89 OTHER SPECIFIED COUNSELING: ICD-10-CM

## 2025-08-20 DIAGNOSIS — G47.33 OBSTRUCTIVE SLEEP APNEA (ADULT) (PEDIATRIC): ICD-10-CM

## 2025-08-20 PROCEDURE — 99214 OFFICE O/P EST MOD 30 MIN: CPT

## 2025-08-20 PROCEDURE — G2211 COMPLEX E/M VISIT ADD ON: CPT

## 2025-08-20 RX ORDER — TRAMADOL HYDROCHLORIDE 50 MG/1
50 TABLET, COATED ORAL
Refills: 0 | Status: ACTIVE | COMMUNITY